# Patient Record
Sex: FEMALE | Race: WHITE | Employment: OTHER | URBAN - METROPOLITAN AREA
[De-identification: names, ages, dates, MRNs, and addresses within clinical notes are randomized per-mention and may not be internally consistent; named-entity substitution may affect disease eponyms.]

---

## 2017-01-04 ENCOUNTER — OFFICE VISIT (OUTPATIENT)
Dept: PAIN MANAGEMENT | Age: 57
End: 2017-01-04

## 2017-01-04 VITALS
HEART RATE: 116 BPM | SYSTOLIC BLOOD PRESSURE: 134 MMHG | DIASTOLIC BLOOD PRESSURE: 87 MMHG | BODY MASS INDEX: 30.42 KG/M2 | WEIGHT: 161 LBS

## 2017-01-04 DIAGNOSIS — M47.817 LUMBOSACRAL SPONDYLOSIS WITHOUT MYELOPATHY: ICD-10-CM

## 2017-01-04 DIAGNOSIS — M79.7 FIBROMYALGIA: Primary | ICD-10-CM

## 2017-01-04 DIAGNOSIS — Z79.899 ENCOUNTER FOR LONG-TERM (CURRENT) USE OF HIGH-RISK MEDICATION: ICD-10-CM

## 2017-01-04 DIAGNOSIS — G89.4 CHRONIC PAIN SYNDROME: ICD-10-CM

## 2017-01-04 DIAGNOSIS — M54.17 LUMBOSACRAL RADICULOPATHY: ICD-10-CM

## 2017-01-04 DIAGNOSIS — M25.50 PAIN IN JOINT, MULTIPLE SITES: ICD-10-CM

## 2017-01-04 DIAGNOSIS — M51.37 DEGENERATION OF LUMBAR OR LUMBOSACRAL INTERVERTEBRAL DISC: ICD-10-CM

## 2017-01-04 LAB
ALCOHOL UR POC: NORMAL
AMPHETAMINES UR POC: NEGATIVE
BARBITURATES UR POC: NEGATIVE
BENZODIAZEPINES UR POC: NORMAL
BUPRENORPHINE UR POC: NORMAL
CANNABINOIDS UR POC: NEGATIVE
CARISOPRODOL UR POC: NORMAL
COCAINE UR POC: NEGATIVE
FENTANYL UR POC: NORMAL
MDMA/ECSTASY UR POC: NEGATIVE
METHADONE UR POC: NORMAL
METHAMPHETAMINE UR POC: NEGATIVE
METHYLPHENIDATE UR POC: NEGATIVE
OPIATES UR POC: NEGATIVE
OXYCODONE UR POC: NEGATIVE
PHENCYCLIDINE UR POC: NEGATIVE
PROPOXYPHENE UR POC: NORMAL
TRAMADOL UR POC: NORMAL
TRICYCLICS UR POC: NEGATIVE

## 2017-01-04 RX ORDER — METHADONE HYDROCHLORIDE 10 MG/1
TABLET ORAL
Qty: 390 TAB | Refills: 0 | Status: SHIPPED | OUTPATIENT
Start: 2017-03-20 | End: 2017-04-04 | Stop reason: SDUPTHER

## 2017-01-04 RX ORDER — METHADONE HYDROCHLORIDE 10 MG/1
TABLET ORAL
Qty: 390 TAB | Refills: 0 | Status: SHIPPED | OUTPATIENT
Start: 2017-02-19 | End: 2017-04-04 | Stop reason: SDUPTHER

## 2017-01-04 RX ORDER — METHADONE HYDROCHLORIDE 10 MG/1
TABLET ORAL
Qty: 390 TAB | Refills: 0 | Status: SHIPPED | OUTPATIENT
Start: 2017-01-21 | End: 2017-04-04 | Stop reason: SDUPTHER

## 2017-01-04 NOTE — PROGRESS NOTES
Nursing Notes    Patient presents to the office today in follow-up. Patient rates her pain at 4/10 on the numerical pain scale. Reviewed medications with counts as follows:    Rx Date filled Qty Dispensed Pill Count Last Dose Short   Methadone 10 12/23/16 390 266 1/4/17 no         Comments:     POC UDS was performed in office today    Any new labs or imaging since last appointment? YES, blood work for c/u    Have you been to an emergency room (ER) or urgent care clinic since your last visit? NO            Have you been hospitalized since your last visit? NO     If yes, where, when, and reason for visit? Have you seen or consulted any other health care providers outside of the [address] [place] [address]  since your last visit? NO     If yes, where, when, and reason for visit? Ms. Liu Olvera has a reminder for a \"due or due soon\" health maintenance. I have asked that she contact her primary care provider for follow-up on this health maintenance.

## 2017-01-04 NOTE — MR AVS SNAPSHOT
Visit Information Date & Time Provider Department Dept. Phone Encounter #  
 1/4/2017  2:40 PM Paco Colon MD [facility] [id number] Follow-up Instructions Return in about 3 months (around 4/4/2017). Follow-up and Disposition History Your Appointments 3/21/2017  2:00 PM  
Office Visit with MD [address] [facility]) Appt Note: Return in about 3 months (around 3/15/2017) for HTN, DM, chronic pain, Med refills [address] [id number]  
[address] [facility] [address] [facility]  
  
    
 4/4/2017  2:40 PM  
Follow Up with Paco Colon MD  
[facility]) Appt Note: return in 2 months [address] [address]  
[phone] [facility] [id number] Upcoming Health Maintenance Date Due Pneumococcal 19-64 Medium Risk (1 of 1 - PPSV23) 3/18/1979 EYE EXAM RETINAL OR DILATED Q1 6/19/2016 BREAST CANCER SCRN MAMMOGRAM 6/30/2016 FOOT EXAM Q1 9/30/2016 COLONOSCOPY 1/20/2017 DTaP/Tdap/Td series (1 - Tdap) 8/23/2017* MICROALBUMIN Q1 4/18/2017 HEMOGLOBIN A1C Q6M 6/15/2017 PAP AKA CERVICAL CYTOLOGY 6/30/2017 LIPID PANEL Q1 12/15/2017 *Topic was postponed. The date shown is not the original due date. Allergies as of 1/4/2017  Review Complete On: 1/4/2017 By: Paco Colon MD  
  
 Severity Noted Reaction Type Reactions Zanaflex [Tizanidine]  04/18/2016   Side Effect Other (comments) Nausea and worsening headache Current Immunizations  Reviewed on 8/23/2016 Name Date Influenza Vaccine 9/30/2015  2:50 PM  
 Influenza Vaccine (Quad) PF 12/15/2016 Pneumococcal Conjugate (PCV-13) 12/15/2016 Not reviewed this visit You Were Diagnosed With   
  
 Codes Comments Fibromyalgia    -  Primary ICD-10-CM: M79.7 ICD-9-CM: 729.1 Encounter for long-term (current) use of high-risk medication     ICD-10-CM: Z79.899 ICD-9-CM: V58.69 Pain in joint, multiple sites     ICD-10-CM: M25.50 ICD-9-CM: 719.49 Lumbosacral radiculopathy     ICD-10-CM: M54.17 ICD-9-CM: 724.4 Degeneration of lumbar or lumbosacral intervertebral disc     ICD-10-CM: M51.37 
ICD-9-CM: 722.52 Lumbosacral spondylosis without myelopathy     ICD-10-CM: D30.086 ICD-9-CM: 060. 3 Chronic pain syndrome     ICD-10-CM: G89.4 ICD-9-CM: 338. 4 Vitals BP Pulse Weight(growth percentile) BMI OB Status Smoking Status 134/87 (!) 116 161 lb (73 kg) 30.42 kg/m2 Hysterectomy Never Smoker BMI and BSA Data Body Mass Index Body Surface Area  
 30.42 kg/m 2 1.77 m 2 Preferred Pharmacy Pharmacy Name Phone [facility], [address] [place] [address] [phone] Your Updated Medication List  
  
   
This list is accurate as of: 1/4/17  3:58 PM.  Always use your most recent med list.  
  
  
  
  
 atenolol 25 mg tablet Commonly known as:  TENORMIN Take 1 Tab by mouth daily. cyclobenzaprine 10 mg tablet Commonly known as:  FLEXERIL  
TAKE 1 TABLET BY MOUTH TWICE DAILY LORazepam 1 mg tablet Commonly known as:  ATIVAN  
TAKE 1 TABLET BY MOUTH TWICE DAILY AS NEEDED FOR ANXIETY  Indications: ANXIETY  
  
 metFORMIN  mg tablet Commonly known as:  GLUCOPHAGE XR Take 1 Tab by mouth daily. * methadone 10 mg tablet Commonly known as:  DOLOPHINE Take 5 tablets in the morning, 4 tablets at noon, and 4 tablets at bedtime for chronic, severe pain. Indications: CHRONIC PAIN, SEVERE PAIN  
  
 * methadone 10 mg tablet Commonly known as:  DOLOPHINE Take 5 tablets in the morning, 4 tablets at noon, and 4 tablets at bedtime for chronic, severe pain. Indications: CHRONIC PAIN, SEVERE PAIN Start taking on:  1/21/2017 * methadone 10 mg tablet Commonly known as:  DOLOPHINE Take 5 tablets in the morning, 4 tablets at noon, and 4 tablets at bedtime for chronic, severe pain. Indications: CHRONIC PAIN, SEVERE PAIN Start taking on:  2/19/2017 * methadone 10 mg tablet Commonly known as:  DOLOPHINE Take 5 tablets in the morning, 4 tablets at noon, and 4 tablets at bedtime for chronic, severe pain. Indications: CHRONIC PAIN, SEVERE PAIN Start taking on:  3/20/2017  
  
 methylphenidate 10 mg tablet Commonly known as:  RITALIN Take 1 Tab by mouth three (3) times daily. Max Daily Amount: 30 mg. NexIUM 40 mg capsule Generic drug:  esomeprazole TK 1 C PO QD AT 3PM  
  
 OTHER  
DDS Lumbar Traction Belt Use as directed  
  
 promethazine 25 mg tablet Commonly known as:  PHENERGAN Take 1 Tab by mouth every six (6) hours as needed for Nausea. simvastatin 40 mg tablet Commonly known as:  ZOCOR  
TAKE 1 TABLET BY MOUTH EVERY NIGHT AT BEDTIME  
  
 topiramate 50 mg tablet Commonly known as:  TOPAMAX TAKE 3 TABLETS BY MOUTH TWICE DAILY * Notice: This list has 4 medication(s) that are the same as other medications prescribed for you. Read the directions carefully, and ask your doctor or other care provider to review them with you. Prescriptions Printed Refills  
 methadone (DOLOPHINE) 10 mg tablet 0 Starting on: 3/20/2017 Sig: Take 5 tablets in the morning, 4 tablets at noon, and 4 tablets at bedtime for chronic, severe pain. Indications: CHRONIC PAIN, SEVERE PAIN Class: Print  
 methadone (DOLOPHINE) 10 mg tablet 0 Starting on: 2/19/2017 Sig: Take 5 tablets in the morning, 4 tablets at noon, and 4 tablets at bedtime for chronic, severe pain. Indications: CHRONIC PAIN, SEVERE PAIN  Class: Print  
 methadone (DOLOPHINE) 10 mg tablet 0  
 Starting on: 1/21/2017 Sig: Take 5 tablets in the morning, 4 tablets at noon, and 4 tablets at bedtime for chronic, severe pain. Indications: CHRONIC PAIN, SEVERE PAIN Class: Print Follow-up Instructions Return in about 3 months (around 4/4/2017). To-Do List   
 01/11/2017  2:30 PM  
  Appointment with [address] at [address] ([phone]) EXAM INSTRUCTIONS -Remove deodorant, powder and/or perfume prior to exam. -If you are currently nursing, breast should be emptied prior to exam.  GENERAL INSTRUCTIONS -If you have a hand-written prescription for this exam, you must bring it with you to your appointment. -You may be responsible for any co-payments and deductibles as indicated by your insurance carrier. -Only patients will be allowed in the exam room, including children. -Children under the age of 15 may not be left unattended. -Bring all relevant prior images from facilities outside of [facility]. Failure to provide images may delay reading by Radiologist. -[address] [place] [address] patients must have an armband and be accompanied by a caregiver or family member. APPOINTMENT CANCELLATION To reschedule or cancel an appointment, please contact the Scheduling Department at [phone]. Patient Instructions Current health maintenance issues were reviewed and the patient was advised to followup with his/her PCP for completion of these items. Introducing [facility]! [facility] introduces Mogo[employer]ix patient portal. Now you can access parts of your medical record, email your doctor's office, and request medication refills online. 1. In your internet browser, go to [email or web address] 2. Click on the First Time User? Click Here link in the Sign In box. You will see the New Member Sign Up page. 3. Enter your MogoTix Access Code exactly as it appears below.  You will not need to use this code after youve completed the sign-up process. If you do not sign up before the expiration date, you must request a new code. · Pressable Access Code: [email or web address] Expires: 3/15/2017  2:35 PM 
 
4. Enter the last four digits of your Social Security Number (xxxx) and Date of Birth (mm/dd/yyyy) as indicated and click Submit. You will be taken to the next sign-up page. 5. Create a Pressable ID. This will be your Pressable login ID and cannot be changed, so think of one that is secure and easy to remember. 6. Create a Pressable password. You can change your password at any time. 7. Enter your Password Reset Question and Answer. This can be used at a later time if you forget your password. 8. Enter your e-mail address. You will receive e-mail notification when new information is available in [address] 9. Click Sign Up. You can now view and download portions of your medical record. 10. Click the Download Summary menu link to download a portable copy of your medical information. If you have questions, please visit the Frequently Asked Questions section of the Pressable website. Remember, Pressable is NOT to be used for urgent needs. For medical emergencies, dial 911. Now available from your iPhone and Android! Please provide this summary of care documentation to your next provider. Your primary care clinician is listed as [facility]. If you have any questions after today's visit, please call [phone].

## 2017-01-04 NOTE — PROGRESS NOTES
HISTORY OF PRESENT ILLNESS  Trista Larios is a 60 y.o. female. HPI returns for follow-up of chronic, severe pain which is widespread and related to fibromyalgia. Pain continues to be under excellent control, averaging 4-10. She has noted that the changing weather has adversely affected her overall pain level. Pain level today 4 out of 10, outcome 9/28,(The lower the upper number, the better the outcome)    Physical activity and mobility as well as sleep are fair, mood is good. No reported side effects. A review of the [place] prescription monitoring program does not identify any inconsistency. UDS obtained and reviewed; formal confirmation from laboratory is pending. Recent laboratory testing was reviewed with abnormalities as follows:  Glucose 122 with hemoglobin A1c 7.6  GFR 53  We will follow up with her PCP. The significance of the findings was discussed with the patient  Review of Systems   Constitutional: Positive for malaise/fatigue and weight loss. Weight gain   HENT: Positive for tinnitus. Vertigo   Eyes: Positive for blurred vision. Glasses   Cardiovascular:        Cold hands and feet   Gastrointestinal: Positive for heartburn and nausea. Musculoskeletal: Positive for joint pain and myalgias. Stiffness   Neurological: Positive for seizures and headaches. Difficulty walking  Difficulty writing   All other systems reviewed and are negative. Physical Exam   Constitutional: She is oriented to person, place, and time. She appears well-developed and well-nourished. She is cooperative. No distress. HENT:   Head: Normocephalic and atraumatic. Neck: No thyromegaly present. Pulmonary/Chest: Effort normal. No respiratory distress. Neurological: She is alert and oriented to person, place, and time. Gait (antalgic, no imbalance) abnormal.   Skin: Skin is warm and dry. She is not diaphoretic. Psychiatric: She has a normal mood and affect.  Her speech is normal. Cognition and memory are normal.   Nursing note and vitals reviewed. ASSESSMENT and PLAN  Encounter Diagnoses   Name Primary?  Fibromyalgia Yes    Encounter for long-term (current) use of high-risk medication     Pain in joint, multiple sites     Lumbosacral radiculopathy     Degeneration of lumbar or lumbosacral intervertebral disc     Lumbosacral spondylosis without myelopathy     Chronic pain syndrome      An EKG will be obtained today to monitor the QTc interval she is on methadone and her last EKG was performed in October 2015. She will continue on her current analgesic regimen as this is providing excellent pain control with improve functionality and minimal side effects. 3 month reassess her    No concerns are raised for misuse, abuse, or diversion. 1. Pain medications are prescribed with the objective of pain relief and improved physical and psychosocial function in this patient. 2. Counseled patient on proper use of prescribed medications and reviewed opioid contract. 3. Counseled patient about chronic medical conditions and their relationship to anxiety and depression and recommended mental health support as needed. 4. Reviewed with patient self-help tools, home exercise, and lifestyle changes to assist the patient in self-management of symptoms. 5. Advised patient to have a primary care provider to continue care for health maintenance and general medical conditions and support for referral to specialty care as needed. 6. Reviewed with patient the treatment plan, goals of treatment plan, and limitations of treatment plan, to include the potential for side effects from medications and procedures. If side effects occur, it is the responsibility of the patient to inform the clinic so that a change in the treatment plan can be made in a safe manner.  The patient is advised that stopping prescribed medication may cause an increase in symptoms and possible medication withdrawal symptoms. The patient is informed an emergency room evaluation may be necessary if this occurs. DISPOSITION: The patients condition and plan were discussed at length and all questions were answered. The patient agrees with the plan.     Counseling occupied > 50% of visit:  Total time: 40 minutes

## 2017-01-18 RX ORDER — LORAZEPAM 1 MG/1
TABLET ORAL
Qty: 60 TAB | Refills: 0 | Status: SHIPPED | OUTPATIENT
Start: 2017-01-18 | End: 2017-04-06 | Stop reason: SDUPTHER

## 2017-04-04 ENCOUNTER — OFFICE VISIT (OUTPATIENT)
Dept: PAIN MANAGEMENT | Age: 57
End: 2017-04-04

## 2017-04-04 VITALS
DIASTOLIC BLOOD PRESSURE: 90 MMHG | BODY MASS INDEX: 30.42 KG/M2 | SYSTOLIC BLOOD PRESSURE: 139 MMHG | WEIGHT: 161 LBS | HEART RATE: 99 BPM

## 2017-04-04 DIAGNOSIS — M79.7 FIBROMYALGIA: ICD-10-CM

## 2017-04-04 DIAGNOSIS — Z79.899 ENCOUNTER FOR LONG-TERM (CURRENT) USE OF HIGH-RISK MEDICATION: ICD-10-CM

## 2017-04-04 DIAGNOSIS — G89.4 CHRONIC PAIN SYNDROME: ICD-10-CM

## 2017-04-04 DIAGNOSIS — K11.23 CHRONIC SIALOADENITIS: ICD-10-CM

## 2017-04-04 DIAGNOSIS — M25.50 PAIN IN JOINT, MULTIPLE SITES: ICD-10-CM

## 2017-04-04 DIAGNOSIS — M15.9 GENERALIZED OA: ICD-10-CM

## 2017-04-04 DIAGNOSIS — M51.37 DEGENERATION OF LUMBAR OR LUMBOSACRAL INTERVERTEBRAL DISC: ICD-10-CM

## 2017-04-04 DIAGNOSIS — M54.17 LUMBOSACRAL RADICULOPATHY: Primary | ICD-10-CM

## 2017-04-04 DIAGNOSIS — M47.817 LUMBOSACRAL SPONDYLOSIS WITHOUT MYELOPATHY: ICD-10-CM

## 2017-04-04 RX ORDER — NALOXONE HYDROCHLORIDE 4 MG/.1ML
4 SPRAY NASAL AS NEEDED
Qty: 1 BOX | Refills: 1 | Status: SHIPPED | OUTPATIENT
Start: 2017-04-04 | End: 2018-05-31 | Stop reason: CLARIF

## 2017-04-04 RX ORDER — METHADONE HYDROCHLORIDE 10 MG/1
TABLET ORAL
Qty: 390 TAB | Refills: 0 | Status: SHIPPED | OUTPATIENT
Start: 2017-05-18 | End: 2018-05-04

## 2017-04-04 RX ORDER — METHADONE HYDROCHLORIDE 10 MG/1
TABLET ORAL
Qty: 390 TAB | Refills: 0 | Status: SHIPPED | OUTPATIENT
Start: 2017-06-16 | End: 2018-05-04

## 2017-04-04 NOTE — PROGRESS NOTES
HISTORY OF PRESENT ILLNESS  Cornel Berger is a 59 y.o. female. HPI returns for follow-up of chronic, severe pain which is widespread and related to fibromyalgia  Since last seen she has experienced a strain of the right paracervical musculature without clear precipitating cause. She has been trying heat and has found that it is improving steadily    Pain level today 4 out of 10, outcome 10/28,(The lower the upper number, the better the outcome)  Physical activity mobility, mood, and sleep are all fair. No reported side effects. A current review of the  does not identify any inconsistency. Review of Systems   Constitutional: Positive for malaise/fatigue and weight loss. Weight gain   HENT: Positive for tinnitus. Vertigo   Eyes: Positive for blurred vision. Glasses   Cardiovascular:        Cold hands and feet   Gastrointestinal: Positive for heartburn and nausea. Musculoskeletal: Positive for joint pain and myalgias. Stiffness   Neurological: Positive for seizures and headaches. Difficulty walking  Difficulty writing   All other systems reviewed and are negative. Physical Exam   Constitutional: She is oriented to person, place, and time. She appears well-developed and well-nourished. She is cooperative. No distress. HENT:   Head: Normocephalic and atraumatic. Neck: No thyromegaly present. Pulmonary/Chest: Effort normal. No respiratory distress. Neurological: She is alert and oriented to person, place, and time. Gait (antalgic, no imbalance) abnormal.   Skin: Skin is warm and dry. She is not diaphoretic. Psychiatric: She has a normal mood and affect. Her speech is normal. Cognition and memory are normal.   Nursing note and vitals reviewed. ASSESSMENT and PLAN  Encounter Diagnoses   Name Primary?     Lumbosacral radiculopathy Yes    Encounter for long-term (current) use of high-risk medication     Pain in joint, multiple sites     Degeneration of lumbar or lumbosacral intervertebral disc     Lumbosacral spondylosis without myelopathy     Chronic pain syndrome     Fibromyalgia     Chronic sialoadenitis     Generalized OA      She will continue on her current analgesic regimen as this is providing excellent pain control with improve functionality and minimal side effects. Because the patient's current regimen places him/her at increased risk for possible overdose, a prescription for naloxone nasal spray is being provided. The patient understands that this medication is only to be used in the setting of a possible overdose and that inadvertent use of this medication could precipitate overt withdrawal.    3 month reassess her    No concerns are raised for misuse, abuse, or diversion. 1. Pain medications are prescribed with the objective of pain relief and improved physical and psychosocial function in this patient. 2. Counseled patient on proper use of prescribed medications and reviewed opioid contract. 3. Counseled patient about chronic medical conditions and their relationship to anxiety and depression and recommended mental health support as needed. 4. Reviewed with patient self-help tools, home exercise, and lifestyle changes to assist the patient in self-management of symptoms. 5. Advised patient to have a primary care provider to continue care for health maintenance and general medical conditions and support for referral to specialty care as needed. 6. Reviewed with patient the treatment plan, goals of treatment plan, and limitations of treatment plan, to include the potential for side effects from medications and procedures. If side effects occur, it is the responsibility of the patient to inform the clinic so that a change in the treatment plan can be made in a safe manner. The patient is advised that stopping prescribed medication may cause an increase in symptoms and possible medication withdrawal symptoms.  The patient is informed an emergency room evaluation may be necessary if this occurs. DISPOSITION: The patients condition and plan were discussed at length and all questions were answered. The patient agrees with the plan.     Counseling occupied > 50% of visit:  Total time: 30 minutes

## 2017-04-04 NOTE — PROGRESS NOTES
Nursing Notes    Patient presents to the office today in follow-up. Patient rates her pain at 4/10 on the numerical pain scale. Reviewed medications with counts as follows:    Rx Date filled Qty Dispensed Pill Count Last Dose Short   Methadone 10 3/22/17 390 246 4/4/17 no       Comments:     POC UDS was not performed in office today    Any new labs or imaging since last appointment? NO    Have you been to an emergency room (ER) or urgent care clinic since your last visit? NO            Have you been hospitalized since your last visit? NO     If yes, where, when, and reason for visit? Have you seen or consulted any other health care providers outside of the [address] [place] [address]  since your last visit? YES     If yes, where, when, and reason for visit? Eye dr for chronic dry eye    Ms. Shahana Hawley has a reminder for a \"due or due soon\" health maintenance. I have asked that she contact her primary care provider for follow-up on this health maintenance.

## 2017-04-06 ENCOUNTER — OFFICE VISIT (OUTPATIENT)
Dept: INTERNAL MEDICINE CLINIC | Age: 57
End: 2017-04-06

## 2017-04-06 ENCOUNTER — HOSPITAL ENCOUNTER (OUTPATIENT)
Dept: LAB | Age: 57
Discharge: HOME | End: 2017-04-06
Payer: MEDICARE

## 2017-04-06 VITALS
SYSTOLIC BLOOD PRESSURE: 124 MMHG | WEIGHT: 166 LBS | TEMPERATURE: 98.3 F | BODY MASS INDEX: 31.34 KG/M2 | HEART RATE: 96 BPM | HEIGHT: 61 IN | DIASTOLIC BLOOD PRESSURE: 75 MMHG | RESPIRATION RATE: 16 BRPM | OXYGEN SATURATION: 99 %

## 2017-04-06 DIAGNOSIS — I10 ESSENTIAL HYPERTENSION: Chronic | ICD-10-CM

## 2017-04-06 DIAGNOSIS — Z76.0 MEDICATION REFILL: ICD-10-CM

## 2017-04-06 DIAGNOSIS — E11.9 TYPE 2 DIABETES MELLITUS WITHOUT COMPLICATION, WITHOUT LONG-TERM CURRENT USE OF INSULIN (HCC): Primary | Chronic | ICD-10-CM

## 2017-04-06 DIAGNOSIS — E78.5 DYSLIPIDEMIA: Chronic | ICD-10-CM

## 2017-04-06 LAB — GLUCOSE POC: 173 MG/DL

## 2017-04-06 PROCEDURE — 80048 BASIC METABOLIC PNL TOTAL CA: CPT | Performed by: INTERNAL MEDICINE

## 2017-04-06 PROCEDURE — 36415 COLL VENOUS BLD VENIPUNCTURE: CPT | Performed by: INTERNAL MEDICINE

## 2017-04-06 PROCEDURE — 83036 HEMOGLOBIN GLYCOSYLATED A1C: CPT | Performed by: INTERNAL MEDICINE

## 2017-04-06 RX ORDER — METHYLPHENIDATE HYDROCHLORIDE 10 MG/1
10 TABLET ORAL 3 TIMES DAILY
Qty: 90 TAB | Refills: 0 | Status: SHIPPED | OUTPATIENT
Start: 2017-04-06 | End: 2017-07-06 | Stop reason: SDUPTHER

## 2017-04-06 RX ORDER — SIMVASTATIN 40 MG/1
TABLET, FILM COATED ORAL
Qty: 90 TAB | Refills: 5 | Status: SHIPPED | OUTPATIENT
Start: 2017-04-06 | End: 2018-04-04 | Stop reason: SDUPTHER

## 2017-04-06 RX ORDER — METFORMIN HYDROCHLORIDE 750 MG/1
750 TABLET, EXTENDED RELEASE ORAL DAILY
Qty: 90 TAB | Refills: 5 | Status: SHIPPED | OUTPATIENT
Start: 2017-04-06 | End: 2017-11-14 | Stop reason: SDUPTHER

## 2017-04-06 RX ORDER — LIFITEGRAST 50 MG/ML
SOLUTION/ DROPS OPHTHALMIC
COMMUNITY
Start: 2017-03-28 | End: 2018-08-22

## 2017-04-06 RX ORDER — CYCLOBENZAPRINE HCL 10 MG
TABLET ORAL
Qty: 180 TAB | Refills: 5 | Status: SHIPPED | OUTPATIENT
Start: 2017-04-06 | End: 2018-04-04 | Stop reason: SDUPTHER

## 2017-04-06 RX ORDER — TOPIRAMATE 50 MG/1
TABLET, FILM COATED ORAL
Qty: 540 TAB | Refills: 5 | Status: SHIPPED | OUTPATIENT
Start: 2017-04-06 | End: 2018-03-16 | Stop reason: SDUPTHER

## 2017-04-06 RX ORDER — LORAZEPAM 1 MG/1
TABLET ORAL
Qty: 60 TAB | Refills: 5 | Status: SHIPPED | OUTPATIENT
Start: 2017-04-06 | End: 2017-07-06 | Stop reason: SDUPTHER

## 2017-04-06 RX ORDER — ATENOLOL 25 MG/1
25 TABLET ORAL DAILY
Qty: 90 TAB | Refills: 5 | Status: SHIPPED | OUTPATIENT
Start: 2017-04-06 | End: 2017-12-26 | Stop reason: SDUPTHER

## 2017-04-06 NOTE — PROGRESS NOTES
Chief Complaint   Patient presents with    Hypertension    Diabetes    Cholesterol Problem    Medication Refill       Pt preferred language for health care discussion is english. Is someone accompanying this pt? Room mate    Is the patient using any DME equipment during OV? no    Depression Screening completed. Yes    Learning Assessment completed. Yes    Abuse Screening completed. Yes    Health Maintenance reviewed and discussed per provider. Yes    Pt is due for FIT test, Colonoscopy, Mammogram, Bone Density, Pap, Hep C screen, Microalbumin, A1C, Lipid, Foot exam, Diabetic eye exam, Zostavax, Pneumo-13 or Peumo-23, Flu, Tdap. Please order/place referral if appropriate. Advance Directive:  1. Do you have an advance directive in place? Patient Reply:no    2. If not, would you like material regarding how to put one in place? Patient Reply: No    Coordination of Care:  1. Have you been to the ER, urgent care clinic since your last visit? Hospitalized since your last visit? no    2. Have you seen or consulted any other health care providers outside of the [address] [place] [address] since your last visit? Include any pap smears or colon screening.  no

## 2017-04-06 NOTE — MR AVS SNAPSHOT
Visit Information Date & Time Provider Department Dept. Phone Encounter #  
 4/6/2017  5:45 PM [address] [phone] [id number] Follow-up Instructions Return in about 3 months (around 7/6/2017) for HTN, DM, Med refills. Your Appointments 6/30/2017  2:40 PM  
Follow Up with Martha Carreno MD  
[facility] Appt Note: return in 3 months 30 [facility] [address] [phone]  Raj[address] [phone] Upcoming Health Maintenance Date Due Pneumococcal 19-64 Medium Risk (1 of 1 - PPSV23) 3/18/1979 EYE EXAM RETINAL OR DILATED Q1 6/19/2016 FOOT EXAM Q1 9/30/2016 COLONOSCOPY 1/20/2017 MICROALBUMIN Q1 4/18/2017 PAP AKA CERVICAL CYTOLOGY 6/30/2017 DTaP/Tdap/Td series (1 - Tdap) 8/23/2017* HEMOGLOBIN A1C Q6M 6/15/2017 LIPID PANEL Q1 12/15/2017 BREAST CANCER SCRN MAMMOGRAM 1/17/2019 *Topic was postponed. The date shown is not the original due date. Allergies as of 4/6/2017  Review Complete On: 4/6/2017 By: Adonis Figueroa MD  
  
 Severity Noted Reaction Type Reactions Zanaflex [Tizanidine]  04/18/2016   Side Effect Other (comments) Nausea and worsening headache Current Immunizations  Reviewed on 8/23/2016 Name Date Influenza Vaccine 9/30/2015  2:50 PM  
 Influenza Vaccine (Quad) PF 12/15/2016 Pneumococcal Conjugate (PCV-13) 12/15/2016 Not reviewed this visit You Were Diagnosed With   
  
 Codes Comments Type 2 diabetes mellitus without complication, without long-term current use of insulin (HCC)    -  Primary ICD-10-CM: E11.9 ICD-9-CM: 250.00 Essential hypertension     ICD-10-CM: I10 
ICD-9-CM: 401.9 Dyslipidemia     ICD-10-CM: E78.5 ICD-9-CM: 272.4 Medication refill     ICD-10-CM: Z76.0 ICD-9-CM: V68.1 Vitals BP Pulse Temp Resp Height(growth percentile) Weight(growth percentile) 124/75 96 98.3 °F (36.8 °C) (Oral) 16 5' 1\" (1.549 m) 166 lb (75.3 kg) SpO2 BMI OB Status Smoking Status 99% 31.37 kg/m2 Hysterectomy Never Smoker BMI and BSA Data Body Mass Index Body Surface Area  
 31.37 kg/m 2 1.8 m 2 Preferred Pharmacy Pharmacy Name Phone 4256 [facility], [address] [place] [address] [phone] Your Updated Medication List  
  
   
This list is accurate as of: 4/6/17  6:29 PM.  Always use your most recent med list.  
  
  
  
  
 atenolol 25 mg tablet Commonly known as:  TENORMIN Take 1 Tab by mouth daily. cyclobenzaprine 10 mg tablet Commonly known as:  FLEXERIL  
TAKE 1 TABLET BY MOUTH TWICE DAILY LORazepam 1 mg tablet Commonly known as:  ATIVAN  
TAKE 1 TABLET BY MOUTH TWICE DAILY AS NEEDED FOR ANXIETY  
  
 metFORMIN  mg tablet Commonly known as:  GLUCOPHAGE XR Take 1 Tab by mouth daily. * methadone 10 mg tablet Commonly known as:  DOLOPHINE Take 5 tablets in the morning, 4 tablets at noon, and 4 tablets at bedtime for chronic, severe pain. Indications: CHRONIC PAIN, SEVERE PAIN  
  
 * methadone 10 mg tablet Commonly known as:  DOLOPHINE Take 5 tablets in the morning, 4 tablets at noon, and 4 tablets at bedtime for chronic, severe pain. Indications: Chronic Pain, Severe Pain Start taking on:  5/18/2017 * methadone 10 mg tablet Commonly known as:  DOLOPHINE Take 5 tablets in the morning, 4 tablets at noon, and 4 tablets at bedtime for chronic, severe pain. Indications: Chronic Pain, Severe Pain Start taking on:  6/16/2017 * methylphenidate 10 mg tablet Commonly known as:  RITALIN Take 1 Tab by mouth three (3) times daily. Max Daily Amount: 30 mg.  
  
 * methylphenidate 10 mg tablet Commonly known as:  RITALIN  
 Take 1 Tab by mouth three (3) times daily. * methylphenidate 10 mg tablet Commonly known as:  RITALIN Take 1 Tab by mouth three (3) times daily. Max Daily Amount: 30 mg.  
  
 naloxone 4 mg/actuation Spry 4 mg by Nasal route as needed for up to 2 doses. Indications: OPIOID TOXICITY NexIUM 40 mg capsule Generic drug:  esomeprazole TK 1 C PO QD AT 3PM  
  
 OTHER  
DDS Lumbar Traction Belt Use as directed  
  
 promethazine 25 mg tablet Commonly known as:  PHENERGAN Take 1 Tab by mouth every six (6) hours as needed for Nausea. simvastatin 40 mg tablet Commonly known as:  ZOCOR  
TAKE 1 TABLET BY MOUTH EVERY NIGHT AT BEDTIME  
  
 topiramate 50 mg tablet Commonly known as:  TOPAMAX TAKE 3 TABLETS BY MOUTH TWICE DAILY XIIDRA 5 % Dpet Generic drug:  lifitegrast  
  
 * Notice: This list has 6 medication(s) that are the same as other medications prescribed for you. Read the directions carefully, and ask your doctor or other care provider to review them with you. Prescriptions Printed Refills LORazepam (ATIVAN) 1 mg tablet 5 Sig: TAKE 1 TABLET BY MOUTH TWICE DAILY AS NEEDED FOR ANXIETY Class: Print  
 methylphenidate (RITALIN) 10 mg tablet 0 Sig: Take 1 Tab by mouth three (3) times daily. Max Daily Amount: 30 mg.  
 Class: Print Route: Oral  
 methylphenidate (RITALIN) 10 mg tablet 0 Sig: Take 1 Tab by mouth three (3) times daily. Class: Print Route: Oral  
 methylphenidate (RITALIN) 10 mg tablet 0 Sig: Take 1 Tab by mouth three (3) times daily. Max Daily Amount: 30 mg.  
 Class: Print Route: Oral  
  
Prescriptions Sent to Pharmacy Refills  
 topiramate (TOPAMAX) 50 mg tablet 5 Sig: TAKE 3 TABLETS BY MOUTH TWICE DAILY  Class: Normal  
 Pharmacy: [facility] [address], [address] [place] [address] Ph #: [phone]  
 simvastatin (ZOCOR) 40 mg tablet 5  
 Sig: TAKE 1 TABLET BY MOUTH EVERY NIGHT AT BEDTIME Class: Normal  
 Pharmacy: [facility] [address], [address] [place] [address] Dr Ph #: [phone]  
 metFORMIN ER (GLUCOPHAGE XR) 750 mg tablet 5 Sig: Take 1 Tab by mouth daily. Class: Normal  
 Pharmacy: [facility] [address] Ph #: [phone] Route: Oral  
 atenolol (TENORMIN) 25 mg tablet 5 Sig: Take 1 Tab by mouth daily. Class: Normal  
 Pharmacy: [facility] [address], [address] [place] [address] Dr Ph #: [phone] Route: Oral  
 cyclobenzaprine (FLEXERIL) 10 mg tablet 5 Sig: TAKE 1 TABLET BY MOUTH TWICE DAILY Class: Normal  
 Pharmacy: [facility] [address], [address] [place] [address] Dr Ph #: [phone] We Performed the Following AMB POC GLUCOSE BLOOD, BY GLUCOSE MONITORING DEVICE [47813 CPT(R)] AMB POC HEMOGLOBIN A1C [55576 CPT(R)] Follow-up Instructions Return in about 3 months (around 7/6/2017) for HTN, DM, Med refills. Introducing [facility]! [facility] introduces [employer] patient portal. Now you can access parts of your medical record, email your doctor's office, and request medication refills online. 1. In your internet browser, go to [email or web address] 2. Click on the First Time User? Click Here link in the Sign In box. You will see the New Member Sign Up page. 3. Enter your [employer] Access Code exactly as it appears below. You will not need to use this code after youve completed the sign-up process. If you do not sign up before the expiration date, you must request a new code. · [employer] Access Code: [email or web address] Expires: 7/3/2017  3:38 PM 
 
 4. Enter the last four digits of your Social Security Number (xxxx) and Date of Birth (mm/dd/yyyy) as indicated and click Submit. You will be taken to the next sign-up page. 5. Create a [employer] ID. This will be your [employer] login ID and cannot be changed, so think of one that is secure and easy to remember. 6. Create a [employer] password. You can change your password at any time. 7. Enter your Password Reset Question and Answer. This can be used at a later time if you forget your password. 8. Enter your e-mail address. You will receive e-mail notification when new information is available in [address] 9. Click Sign Up. You can now view and download portions of your medical record. 10. Click the Download Summary menu link to download a portable copy of your medical information. If you have questions, please visit the Frequently Asked Questions section of the [employer] website. Remember, [employer] is NOT to be used for urgent needs. For medical emergencies, dial 911. Now available from your iPhone and Android! Please provide this summary of care documentation to your next provider. Your primary care clinician is listed as [facility]. If you have any questions after today's visit, please call [phone].

## 2017-04-06 NOTE — PROGRESS NOTES
HISTORY OF PRESENT ILLNESS  Rosemarie Benavides is a 59 y.o. female. Visit Vitals    /75    Pulse 96    Temp 98.3 °F (36.8 °C) (Oral)    Resp 16    Ht 5' 1\" (1.549 m)    Wt 166 lb (75.3 kg)    SpO2 99%    BMI 31.37 kg/m2       Hypertension    The history is provided by the patient. This is a chronic problem. The current episode started more than 1 week ago. The problem has not changed since onset. There are no associated agents to hypertension. Risk factors include obesity, a sedentary lifestyle, diabetes mellitus and stress. Diabetes   The history is provided by the patient. This is a chronic problem. The current episode started more than 1 week ago. The problem occurs daily. The problem has not changed since onset. Exacerbated by: diet. The symptoms are relieved by medications (diet). Treatments tried: see med. The treatment provided moderate relief. Cholesterol Problem   The history is provided by the patient. This is a chronic problem. The current episode started more than 1 week ago. The problem occurs daily. Exacerbated by: diet. The symptoms are relieved by medications (diet). Treatments tried: see med list. The treatment provided moderate relief. Medication Refill         Review of Systems   Constitutional: Negative. Respiratory: Negative. Cardiovascular: Negative. Musculoskeletal: Positive for joint pain and myalgias. Neurological: Negative. Physical Exam   Constitutional: She is oriented to person, place, and time. She appears well-developed and well-nourished. No distress. HENT:   Right jaw pain and movement. Cardiovascular: Normal rate and regular rhythm. Pulmonary/Chest: Effort normal and breath sounds normal.   Neurological: She is alert and oriented to person, place, and time. Skin: Skin is warm and dry. She is not diaphoretic. Psychiatric: She has a normal mood and affect. Nursing note and vitals reviewed.       ASSESSMENT and PLAN    ICD-10-CM ICD-9-CM    1. Type 2 diabetes mellitus without complication, without long-term current use of insulin (HCC) E11.9 250.00 AMB POC GLUCOSE BLOOD, BY GLUCOSE MONITORING DEVICE      AMB POC HEMOGLOBIN A1C   2. Essential hypertension I10 401.9    3. Dyslipidemia E78.5 272.4    4.  Medication refill Z76.0 V68.1 LORazepam (ATIVAN) 1 mg tablet      topiramate (TOPAMAX) 50 mg tablet      simvastatin (ZOCOR) 40 mg tablet      metFORMIN ER (GLUCOPHAGE XR) 750 mg tablet      atenolol (TENORMIN) 25 mg tablet      cyclobenzaprine (FLEXERIL) 10 mg tablet      methylphenidate (RITALIN) 10 mg tablet      methylphenidate (RITALIN) 10 mg tablet      methylphenidate (RITALIN) 10 mg tablet       Doing OK    BP controlled    Check glu today    F/u 3 months for med refills

## 2017-04-07 ENCOUNTER — HOSPITAL ENCOUNTER (OUTPATIENT)
Dept: LAB | Age: 57
Discharge: HOME | End: 2017-04-07

## 2017-04-07 DIAGNOSIS — E11.9 TYPE 2 DIABETES MELLITUS WITHOUT COMPLICATION, WITHOUT LONG-TERM CURRENT USE OF INSULIN (HCC): Chronic | ICD-10-CM

## 2017-04-07 DIAGNOSIS — I10 ESSENTIAL HYPERTENSION: Chronic | ICD-10-CM

## 2017-04-07 LAB
ANION GAP BLD CALC-SCNC: 8 MMOL/L (ref 3–18)
BUN SERPL-MCNC: 14 MG/DL (ref 7–18)
BUN/CREAT SERPL: 12 (ref 12–20)
CALCIUM SERPL-MCNC: 8.7 MG/DL (ref 8.5–10.1)
CHLORIDE SERPL-SCNC: 108 MMOL/L (ref 100–108)
CO2 SERPL-SCNC: 26 MMOL/L (ref 21–32)
CREAT SERPL-MCNC: 1.2 MG/DL (ref 0.6–1.3)
GLUCOSE SERPL-MCNC: 131 MG/DL (ref 74–99)
HBA1C MFR BLD: 8 % (ref 4.2–5.6)
POTASSIUM SERPL-SCNC: 4 MMOL/L (ref 3.5–5.5)
SODIUM SERPL-SCNC: 142 MMOL/L (ref 136–145)

## 2017-04-18 ENCOUNTER — TELEPHONE (OUTPATIENT)
Dept: PAIN MANAGEMENT | Age: 57
End: 2017-04-18

## 2017-04-18 NOTE — TELEPHONE ENCOUNTER
Narcan spray was denied by [facility] - must have trial and failure of 2 formulary medications - naloxone hcl solution 0.4mg/ml injection or 1 mg/ml injection.

## 2017-07-06 ENCOUNTER — OFFICE VISIT (OUTPATIENT)
Dept: INTERNAL MEDICINE CLINIC | Age: 57
End: 2017-07-06

## 2017-07-06 ENCOUNTER — HOSPITAL ENCOUNTER (OUTPATIENT)
Dept: LAB | Age: 57
Discharge: HOME | End: 2017-07-06
Payer: MEDICARE

## 2017-07-06 VITALS
OXYGEN SATURATION: 96 % | HEART RATE: 104 BPM | DIASTOLIC BLOOD PRESSURE: 89 MMHG | BODY MASS INDEX: 32 KG/M2 | HEIGHT: 60 IN | SYSTOLIC BLOOD PRESSURE: 132 MMHG | TEMPERATURE: 99 F | RESPIRATION RATE: 15 BRPM | WEIGHT: 163 LBS

## 2017-07-06 DIAGNOSIS — E11.9 TYPE 2 DIABETES MELLITUS WITHOUT COMPLICATION, WITHOUT LONG-TERM CURRENT USE OF INSULIN (HCC): Chronic | ICD-10-CM

## 2017-07-06 DIAGNOSIS — R30.9 URINARY PAIN: ICD-10-CM

## 2017-07-06 DIAGNOSIS — E11.9 TYPE 2 DIABETES MELLITUS WITHOUT COMPLICATION, WITHOUT LONG-TERM CURRENT USE OF INSULIN (HCC): Primary | Chronic | ICD-10-CM

## 2017-07-06 DIAGNOSIS — I10 ESSENTIAL HYPERTENSION: Chronic | ICD-10-CM

## 2017-07-06 DIAGNOSIS — E78.5 DYSLIPIDEMIA: Chronic | ICD-10-CM

## 2017-07-06 DIAGNOSIS — Z12.11 SCREEN FOR COLON CANCER: ICD-10-CM

## 2017-07-06 DIAGNOSIS — Z76.0 MEDICATION REFILL: ICD-10-CM

## 2017-07-06 LAB
ALBUMIN SERPL BCP-MCNC: 4.2 G/DL (ref 3.4–5)
ALBUMIN/GLOB SERPL: 1.3 {RATIO} (ref 0.8–1.7)
ALP SERPL-CCNC: 69 U/L (ref 45–117)
ALT SERPL-CCNC: 25 U/L (ref 13–56)
ANION GAP BLD CALC-SCNC: 9 MMOL/L (ref 3–18)
AST SERPL W P-5'-P-CCNC: 22 U/L (ref 15–37)
BILIRUB SERPL-MCNC: 0.3 MG/DL (ref 0.2–1)
BILIRUB UR QL STRIP: NEGATIVE
BUN SERPL-MCNC: 11 MG/DL (ref 7–18)
BUN/CREAT SERPL: 9 (ref 12–20)
CALCIUM SERPL-MCNC: 9.4 MG/DL (ref 8.5–10.1)
CHLORIDE SERPL-SCNC: 107 MMOL/L (ref 100–108)
CHOLEST SERPL-MCNC: 113 MG/DL
CO2 SERPL-SCNC: 23 MMOL/L (ref 21–32)
CREAT SERPL-MCNC: 1.18 MG/DL (ref 0.6–1.3)
GLOBULIN SER CALC-MCNC: 3.3 G/DL (ref 2–4)
GLUCOSE SERPL-MCNC: 93 MG/DL (ref 74–99)
GLUCOSE UR-MCNC: NEGATIVE MG/DL
HBA1C MFR BLD: 7.5 % (ref 4.2–5.6)
HDLC SERPL-MCNC: 49 MG/DL (ref 40–60)
HDLC SERPL: 2.3 {RATIO} (ref 0–5)
KETONES P FAST UR STRIP-MCNC: NEGATIVE MG/DL
LDLC SERPL CALC-MCNC: 39.4 MG/DL (ref 0–100)
LIPID PROFILE,FLP: NORMAL
PH UR STRIP: 7 [PH] (ref 4.6–8)
POTASSIUM SERPL-SCNC: 4.5 MMOL/L (ref 3.5–5.5)
PROT SERPL-MCNC: 7.5 G/DL (ref 6.4–8.2)
PROT UR QL STRIP: NEGATIVE MG/DL
SODIUM SERPL-SCNC: 139 MMOL/L (ref 136–145)
SP GR UR STRIP: 1.01 (ref 1–1.03)
TRIGL SERPL-MCNC: 123 MG/DL (ref ?–150)
UA UROBILINOGEN AMB POC: NORMAL (ref 0.2–1)
URINALYSIS CLARITY POC: CLEAR
URINALYSIS COLOR POC: YELLOW
URINE BLOOD POC: NEGATIVE
URINE LEUKOCYTES POC: NEGATIVE
URINE NITRITES POC: NEGATIVE
VLDLC SERPL CALC-MCNC: 24.6 MG/DL

## 2017-07-06 PROCEDURE — 36415 COLL VENOUS BLD VENIPUNCTURE: CPT | Performed by: INTERNAL MEDICINE

## 2017-07-06 PROCEDURE — 82043 UR ALBUMIN QUANTITATIVE: CPT | Performed by: INTERNAL MEDICINE

## 2017-07-06 PROCEDURE — 83036 HEMOGLOBIN GLYCOSYLATED A1C: CPT | Performed by: INTERNAL MEDICINE

## 2017-07-06 PROCEDURE — 80061 LIPID PANEL: CPT | Performed by: INTERNAL MEDICINE

## 2017-07-06 PROCEDURE — 80053 COMPREHEN METABOLIC PANEL: CPT | Performed by: INTERNAL MEDICINE

## 2017-07-06 RX ORDER — METHYLPHENIDATE HYDROCHLORIDE 10 MG/1
10 TABLET ORAL 3 TIMES DAILY
Qty: 90 TAB | Refills: 0 | Status: SHIPPED | OUTPATIENT
Start: 2017-07-06 | End: 2018-04-04 | Stop reason: SDUPTHER

## 2017-07-06 RX ORDER — METHYLPHENIDATE HYDROCHLORIDE 10 MG/1
10 TABLET ORAL 3 TIMES DAILY
Qty: 90 TAB | Refills: 0 | Status: SHIPPED | OUTPATIENT
Start: 2017-07-06 | End: 2018-11-26 | Stop reason: SDUPTHER

## 2017-07-06 RX ORDER — METHYLPHENIDATE HYDROCHLORIDE 10 MG/1
10 TABLET ORAL 3 TIMES DAILY
Qty: 90 TAB | Refills: 0 | Status: SHIPPED | OUTPATIENT
Start: 2017-07-06 | End: 2017-12-26 | Stop reason: SDUPTHER

## 2017-07-06 RX ORDER — LORAZEPAM 1 MG/1
TABLET ORAL
Qty: 60 TAB | Refills: 5 | Status: SHIPPED | OUTPATIENT
Start: 2017-07-06 | End: 2017-12-26 | Stop reason: SDUPTHER

## 2017-07-06 RX ORDER — FLUOROMETHOLONE 1 MG/ML
SOLUTION/ DROPS OPHTHALMIC
Refills: 2 | COMMUNITY
Start: 2017-05-08 | End: 2018-08-22

## 2017-07-06 RX ORDER — PHENAZOPYRIDINE HYDROCHLORIDE 100 MG/1
100 TABLET, FILM COATED ORAL
Qty: 15 TAB | Refills: 0 | Status: SHIPPED | OUTPATIENT
Start: 2017-07-06 | End: 2018-05-04

## 2017-07-06 NOTE — MR AVS SNAPSHOT
Visit Information Date & Time Provider Department Dept. Phone Encounter #  
 7/6/2017  3:30 PM Ryan Pickard, [facility] [phone] [id number] Follow-up Instructions Return in about 3 months (around 10/6/2017) for Well Woman Exam, Med refills, HTN, DM. Your Appointments 7/13/2017  2:40 PM  
Follow Up with Elysia Mejia PA-C [facility] (CLAUDE SCHEDULING) Appt Note: return in 3 months; provider out of office [address] [place], [place] [id number]  
[phone] Shraddha Urban [address] [address] Upcoming Health Maintenance Date Due  
 FOOT EXAM Q1 9/30/2016 COLONOSCOPY 1/20/2017 MICROALBUMIN Q1 4/18/2017 PAP AKA CERVICAL CYTOLOGY 6/30/2017 DTaP/Tdap/Td series (1 - Tdap) 8/23/2017* Pneumococcal 19-64 Medium Risk (1 of 1 - PPSV23) 1/2/2018* INFLUENZA AGE 9 TO ADULT 8/1/2017 HEMOGLOBIN A1C Q6M 10/6/2017 LIPID PANEL Q1 12/15/2017 EYE EXAM RETINAL OR DILATED Q1 3/14/2018 BREAST CANCER SCRN MAMMOGRAM 1/17/2019 *Topic was postponed. The date shown is not the original due date. Allergies as of 7/6/2017  Review Complete On: 7/6/2017 By: Ryan Pickard MD  
  
 Severity Noted Reaction Type Reactions Zanaflex [Tizanidine]  04/18/2016   Side Effect Other (comments) Nausea and worsening headache Current Immunizations  Reviewed on 8/23/2016 Name Date Influenza Vaccine 9/30/2015  2:50 PM  
 Influenza Vaccine (Quad) PF 12/15/2016 Pneumococcal Conjugate (PCV-13) 12/15/2016 Not reviewed this visit You Were Diagnosed With   
  
 Codes Comments Type 2 diabetes mellitus without complication, without long-term current use of insulin (HCC)    -  Primary ICD-10-CM: E11.9 ICD-9-CM: 250.00 Essential hypertension     ICD-10-CM: I10 
ICD-9-CM: 401.9 Dyslipidemia     ICD-10-CM: E78.5 ICD-9-CM: 272.4 Urinary pain     ICD-10-CM: R30.9 ICD-9-CM: 788.1 Medication refill     ICD-10-CM: Z76.0 ICD-9-CM: V68.1 Screen for colon cancer     ICD-10-CM: Z12.11 ICD-9-CM: V76.51 Vitals BP Pulse Temp Resp Height(growth percentile) Weight(growth percentile) 132/89 (!) 104 99 °F (37.2 °C) (Oral) 15 5' (1.524 m) 163 lb (73.9 kg) SpO2 BMI OB Status Smoking Status 96% 31.83 kg/m2 Hysterectomy Never Smoker BMI and BSA Data Body Mass Index Body Surface Area  
 31.83 kg/m 2 1.77 m 2 Preferred Pharmacy Pharmacy Name Phone 0700 [facility], [address] [place] [address] [phone] Your Updated Medication List  
  
   
This list is accurate as of: 7/6/17  4:32 PM.  Always use your most recent med list.  
  
  
  
  
 atenolol 25 mg tablet Commonly known as:  TENORMIN Take 1 Tab by mouth daily. cyclobenzaprine 10 mg tablet Commonly known as:  FLEXERIL  
TAKE 1 TABLET BY MOUTH TWICE DAILY  
  
 fluorometholone 0.1 % ophthalmic suspension Commonly known as: [address] [facility] LQ AND INT 1 GTT IN OU BID LORazepam 1 mg tablet Commonly known as:  ATIVAN  
TAKE 1 TABLET BY MOUTH TWICE DAILY AS NEEDED FOR ANXIETY  
  
 metFORMIN  mg tablet Commonly known as:  GLUCOPHAGE XR Take 1 Tab by mouth daily. * methadone 10 mg tablet Commonly known as:  DOLOPHINE Take 5 tablets in the morning, 4 tablets at noon, and 4 tablets at bedtime for chronic, severe pain. Indications: CHRONIC PAIN, SEVERE PAIN  
  
 * methadone 10 mg tablet Commonly known as:  DOLOPHINE Take 5 tablets in the morning, 4 tablets at noon, and 4 tablets at bedtime for chronic, severe pain. Indications: Chronic Pain, Severe Pain * methadone 10 mg tablet Commonly known as:  DOLOPHINE Take 5 tablets in the morning, 4 tablets at noon, and 4 tablets at bedtime for chronic, severe pain. Indications: Chronic Pain, Severe Pain * methylphenidate 10 mg tablet Commonly known as:  RITALIN Take 1 Tab by mouth three (3) times daily. Max Daily Amount: 30 mg.  
  
 * methylphenidate 10 mg tablet Commonly known as:  RITALIN Take 1 Tab by mouth three (3) times daily. * methylphenidate 10 mg tablet Commonly known as:  RITALIN Take 1 Tab by mouth three (3) times daily. Max Daily Amount: 30 mg.  
  
 naloxone 4 mg/actuation Spry 4 mg by Nasal route as needed for up to 2 doses. Indications: OPIOID TOXICITY NexIUM 40 mg capsule Generic drug:  esomeprazole TK 1 C PO QD AT 3PM  
  
 OTHER  
DDS Lumbar Traction Belt Use as directed  
  
 phenazopyridine 100 mg tablet Commonly known as:  PYRIDIUM Take 1 Tab by mouth three (3) times daily as needed for Pain. promethazine 25 mg tablet Commonly known as:  PHENERGAN Take 1 Tab by mouth every six (6) hours as needed for Nausea. simvastatin 40 mg tablet Commonly known as:  ZOCOR  
TAKE 1 TABLET BY MOUTH EVERY NIGHT AT BEDTIME  
  
 topiramate 50 mg tablet Commonly known as:  TOPAMAX TAKE 3 TABLETS BY MOUTH TWICE DAILY XIIDRA 5 % Dpet Generic drug:  lifitegrast  
  
 * Notice: This list has 6 medication(s) that are the same as other medications prescribed for you. Read the directions carefully, and ask your doctor or other care provider to review them with you. Prescriptions Printed Refills LORazepam (ATIVAN) 1 mg tablet 5 Sig: TAKE 1 TABLET BY MOUTH TWICE DAILY AS NEEDED FOR ANXIETY Class: Print  
 methylphenidate (RITALIN) 10 mg tablet 0 Sig: Take 1 Tab by mouth three (3) times daily. Max Daily Amount: 30 mg.  
 Class: Print Route: Oral  
 methylphenidate (RITALIN) 10 mg tablet 0 Sig: Take 1 Tab by mouth three (3) times daily. Class: Print  Route: Oral  
 methylphenidate (RITALIN) 10 mg tablet 0  
 Sig: Take 1 Tab by mouth three (3) times daily. Max Daily Amount: 30 mg.  
 Class: Print Route: Oral  
  
Prescriptions Sent to Pharmacy Refills  
 phenazopyridine (PYRIDIUM) 100 mg tablet 0 Sig: Take 1 Tab by mouth three (3) times daily as needed for Pain. Class: Normal  
 Pharmacy: [facility] [address], [address]  #: [phone] Route: Oral  
  
We Performed the Following AMB POC URINALYSIS DIP STICK AUTO W/O MICRO [60927 CPT(R)] HM DIABETES FOOT EXAM [HM7 Custom] Follow-up Instructions Return in about 3 months (around 10/6/2017) for Well Woman Exam, Med refills, HTN, DM. To-Do List   
 07/06/2017 Lab:  HEMOGLOBIN A1C W/O EAG   
  
 07/06/2017 Lab:  LIPID PANEL   
  
 07/06/2017 Lab:  METABOLIC PANEL, COMPREHENSIVE   
  
 07/06/2017 Lab:  MICROALBUMIN, UR, RAND W/ MICROALBUMIN/CREA RATIO   
  
 07/06/2017 Lab:  OCCULT BLOOD, IMMUNOASSAY (FIT) Introducing [facility] & HEALTH SERVICES! Mi Maurer introduces [employer] patient portal. Now you can access parts of your medical record, email your doctor's office, and request medication refills online. 1. In your internet browser, go to [email or web address] 2. Click on the First Time User? Click Here link in the Sign In box. You will see the New Member Sign Up page. 3. Enter your [employer] Access Code exactly as it appears below. You will not need to use this code after youve completed the sign-up process. If you do not sign up before the expiration date, you must request a new code. · [employer] Access Code: [email or web address] Expires: 10/4/2017  4:32 PM 
 
4. Enter the last four digits of your Social Security Number (xxxx) and Date of Birth (mm/dd/yyyy) as indicated and click Submit. You will be taken to the next sign-up page. 5. Create a [employer] ID.  This will be your [employer] login ID and cannot be changed, so think of one that is secure and easy to remember. 6. Create a [employer] password. You can change your password at any time. 7. Enter your Password Reset Question and Answer. This can be used at a later time if you forget your password. 8. Enter your e-mail address. You will receive e-mail notification when new information is available in [address] 9. Click Sign Up. You can now view and download portions of your medical record. 10. Click the Download Summary menu link to download a portable copy of your medical information. If you have questions, please visit the Frequently Asked Questions section of the [employer] website. Remember, [employer] is NOT to be used for urgent needs. For medical emergencies, dial 911. Now available from your iPhone and Android! Please provide this summary of care documentation to your next provider. Your primary care clinician is listed as [facility]. If you have any questions after today's visit, please call [phone].

## 2017-07-06 NOTE — PROGRESS NOTES
ROOM # 3    Judit Simpson presents today for   Chief Complaint   Patient presents with    Hypertension    Diabetes    Medication Refill   pt c/o sx of UTI    Judit Simpson preferred language for health care discussion is english/other. Is someone accompanying this pt? no    Is the patient using any DME equipment during OV? no    Depression Screening:  PHQ over the last two weeks 4/6/2017 4/4/2017 8/23/2016 4/18/2016 4/13/2015 4/21/2014 1/27/2014   Little interest or pleasure in doing things Not at all Several days Not at all Not at all Not at all Not at all Several days   Feeling down, depressed or hopeless Not at all Several days Not at all Not at all Not at all Not at all Several days   Total Score PHQ 2 0 2 0 0 0 0 2       Learning Assessment:  Learning Assessment 4/1/2015 2/6/2015 1/27/2014   PRIMARY LEARNER Patient Patient Patient   PRIMARY LANGUAGE ENGLISH ENGLISH ENGLISH   LEARNER PREFERENCE PRIMARY LISTENING LISTENING DEMONSTRATION     - READING -   ANSWERED BY patient patient patient   RELATIONSHIP SELF SELF SELF       Abuse Screening:  Abuse Screening Questionnaire 4/13/2015   Do you ever feel afraid of your partner? N   Are you in a relationship with someone who physically or mentally threatens you? N   Is it safe for you to go home? Y       Fall Risk  Fall Risk Assessment, last 12 mths 1/27/2014   Able to walk? Yes   Fall in past 12 months? No       Health Maintenance reviewed and discussed per provider. Yes    Judit Simpson is due for foot exam, colonoscopy, microalbumin. Please order/place referral if appropriate. Advance Directive:  1. Do you have an advance directive in place? Patient Reply: no    2. If not, would you like material regarding how to put one in place? Patient Reply: no    Coordination of Care:  1. Have you been to the ER, urgent care clinic since your last visit? Hospitalized since your last visit? no    2.  Have you seen or consulted any other health care providers outside of the [address] [place] [address] since your last visit? Include any pap smears or colon screening.  no

## 2017-07-06 NOTE — PROGRESS NOTES
HISTORY OF PRESENT ILLNESS  Ijeoma Allen is a 59 y.o. female. Visit Vitals    /89    Pulse (!) 104    Temp 99 °F (37.2 °C) (Oral)    Resp 15    Ht 5' (1.524 m)    Wt 163 lb (73.9 kg)    SpO2 96%    BMI 31.83 kg/m2       Hypertension    The history is provided by the patient. This is a chronic problem. The current episode started more than 1 week ago. The problem has not changed since onset. Risk factors include obesity, a sedentary lifestyle and hypertension. Diabetes   The history is provided by the patient. This is a chronic problem. The current episode started more than 1 week ago. The problem occurs daily. The problem has not changed since onset. Exacerbated by: diet. The symptoms are relieved by medications (diet). Treatments tried: see med list. The treatment provided moderate relief. Medication Refill   The history is provided by the patient. This is a new problem. Urinary Pain    The history is provided by the patient (stinging after voiding. Some urgency). This is a new problem. The current episode started more than 1 week ago. The problem occurs intermittently. The problem has not changed since onset. There has been no fever. Pertinent negatives include no chills. Review of Systems   Constitutional: Negative for chills and fever. Psychiatric/Behavioral: Positive for depression. Physical Exam   Constitutional: She is oriented to person, place, and time. She appears well-developed and well-nourished. No distress. Cardiovascular: Normal rate and regular rhythm. Pulmonary/Chest: Effort normal and breath sounds normal.   Musculoskeletal: She exhibits no edema. Neurological: She is alert and oriented to person, place, and time.    Diabetic foot exam:     Left: Reflexes 2+     Vibratory sensation normal    Proprioception normal   Sharp/dull discrimination     Filament test normal sensation with micro filament   Pulse DP: 2+ (normal)   Pulse PT:    Deformities: None    Right: Reflexes 2+   Vibratory sensation normal   Proprioception normal   Sharp/dull discrimination    Filament test normal sensation with micro filament   Pulse DP: 2+ (normal)   Pulse PT:    Deformities: Mild - bunion. Skin: Skin is warm and dry. She is not diaphoretic. Psychiatric: She has a normal mood and affect. Nursing note and vitals reviewed. ASSESSMENT and PLAN    ICD-10-CM ICD-9-CM    1. Type 2 diabetes mellitus without complication, without long-term current use of insulin ([facility]) Y43.0 723.12 METABOLIC PANEL, COMPREHENSIVE      LIPID PANEL      HEMOGLOBIN A1C W/O EAG      MICROALBUMIN, UR, RAND W/ MICROALBUMIN/CREA RATIO      HM DIABETES FOOT EXAM   2. Essential hypertension O03 223.6 METABOLIC PANEL, COMPREHENSIVE   3. Dyslipidemia E78.5 272.4 LIPID PANEL   4. Urinary pain R30.9 788.1 AMB POC URINALYSIS DIP STICK AUTO W/O MICRO      phenazopyridine (PYRIDIUM) 100 mg tablet   5. Medication refill Z76.0 V68.1 LORazepam (ATIVAN) 1 mg tablet      methylphenidate (RITALIN) 10 mg tablet      methylphenidate (RITALIN) 10 mg tablet      methylphenidate (RITALIN) 10 mg tablet   6. Screen for colon cancer Z12.11 V76.51 OCCULT BLOOD, IMMUNOASSAY (FIT)     BP controlled    Update lab today     reviewed and appropriate activity noted. No adverse activity noted. Pt had UDS through pain management    Will try pyridium for the urinary sxs--may just be spasm. Her dip was completely clean. Incidentally, Discussed BMI/weight, lifestyle, diet and exercise.  She is aware of the effect on BP, DM, joints    F/U 3 months for WWE with PAP

## 2017-07-07 LAB
CREAT UR-MCNC: 61.66 MG/DL (ref 30–125)
MICROALBUMIN UR-MCNC: 0.2 MG/DL (ref 0–3)
MICROALBUMIN/CREAT UR-RTO: 3 MG/G (ref 0–30)

## 2017-07-24 ENCOUNTER — OFFICE VISIT (OUTPATIENT)
Dept: PAIN MANAGEMENT | Age: 57
End: 2017-07-24

## 2017-07-24 VITALS
WEIGHT: 162 LBS | DIASTOLIC BLOOD PRESSURE: 91 MMHG | BODY MASS INDEX: 31.8 KG/M2 | HEART RATE: 107 BPM | HEIGHT: 60 IN | TEMPERATURE: 99 F | SYSTOLIC BLOOD PRESSURE: 150 MMHG

## 2017-07-24 DIAGNOSIS — M15.9 GENERALIZED OA: ICD-10-CM

## 2017-07-24 DIAGNOSIS — Z79.899 ENCOUNTER FOR LONG-TERM (CURRENT) USE OF HIGH-RISK MEDICATION: ICD-10-CM

## 2017-07-24 DIAGNOSIS — M47.817 LUMBOSACRAL SPONDYLOSIS WITHOUT MYELOPATHY: ICD-10-CM

## 2017-07-24 DIAGNOSIS — M79.7 FIBROMYALGIA: ICD-10-CM

## 2017-07-24 DIAGNOSIS — M51.37 DEGENERATION OF LUMBAR OR LUMBOSACRAL INTERVERTEBRAL DISC: ICD-10-CM

## 2017-07-24 DIAGNOSIS — G40.909 SEIZURE DISORDER (HCC): ICD-10-CM

## 2017-07-24 DIAGNOSIS — G89.4 CHRONIC PAIN SYNDROME: Primary | ICD-10-CM

## 2017-07-24 DIAGNOSIS — M25.50 PAIN IN JOINT, MULTIPLE SITES: ICD-10-CM

## 2017-07-24 RX ORDER — METHADONE HYDROCHLORIDE 10 MG/1
TABLET ORAL
Qty: 360 TAB | Refills: 0 | Status: SHIPPED | OUTPATIENT
Start: 2017-09-15 | End: 2018-05-04

## 2017-07-24 RX ORDER — METHADONE HYDROCHLORIDE 10 MG/1
TABLET ORAL
Qty: 390 TAB | Refills: 0 | Status: SHIPPED | OUTPATIENT
Start: 2017-08-16 | End: 2018-05-04

## 2017-07-24 RX ORDER — METHADONE HYDROCHLORIDE 10 MG/1
TABLET ORAL
Qty: 390 TAB | Refills: 0 | Status: SHIPPED | OUTPATIENT
Start: 2017-09-15 | End: 2018-05-04

## 2017-07-24 RX ORDER — METHADONE HYDROCHLORIDE 10 MG/1
TABLET ORAL
Qty: 360 TAB | Refills: 0 | Status: SHIPPED | OUTPATIENT
Start: 2017-08-16 | End: 2018-05-04

## 2017-07-24 RX ORDER — METHADONE HYDROCHLORIDE 10 MG/1
TABLET ORAL
Qty: 390 TAB | Refills: 0 | Status: SHIPPED | OUTPATIENT
Start: 2017-10-14 | End: 2018-05-04

## 2017-07-24 RX ORDER — METHADONE HYDROCHLORIDE 10 MG/1
TABLET ORAL
Qty: 360 TAB | Refills: 0 | Status: SHIPPED | OUTPATIENT
Start: 2017-10-14 | End: 2018-05-04
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
## 2017-07-24 NOTE — PATIENT INSTRUCTIONS
PLAN / Pt Instructions:  1. Continue current plan with no evidence of addiction or diversion. Stable on current medication without adverse events. 2. Refilled Methadone 10 mg tablet, 4 tablets PO in morning, 4 tablets at noon, and 4 tablets at bedtime. 3. Discussed risks of addiction, dependency, and opioid induced hyperalgesia. 4. Reviewed with patient benefits of home exercise, and lifestyle changes to assist the patient in self-management of symptoms.   5. Return to clinic in 3 months

## 2017-07-24 NOTE — PROGRESS NOTES
Nursing Notes    Patient presents to the office today in follow-up. Patient rates her pain at 7/10 on the numerical pain scale. Reviewed medications with counts as follows:    Rx Date filled Qty Dispensed Pill Count Last Dose Short   Methadone 10mg 07/17/17 390 269 today no                                          Comments:     POC UDS was performed in office today    Any new labs or imaging since last appointment? NO    Have you been to an emergency room (ER) or urgent care clinic since your last visit? NO            Have you been hospitalized since your last visit? NO     If yes, where, when, and reason for visit? Have you seen or consulted any other health care providers outside of the [address] [place] [address]  since your last visit? NO     If yes, where, when, and reason for visit? HM deferred to pcp.

## 2017-07-24 NOTE — MR AVS SNAPSHOT
Visit Information Date & Time Provider Department Dept. Phone Encounter #  
 7/24/2017  2:20 PM Binta Hogue [address] for Pain Management [phone] Follow-up Instructions Return in about 3 months (around 10/24/2017). Your Appointments 10/9/2017  3:30 PM  
WELL WOMAN EXAM with Kenisha Cox MD  
[facility] ([address]) Appt Note: 3 month f/u WWE, HTN, DM  
 [address]  
  
   
 [address] [facility] Upcoming Health Maintenance Date Due COLONOSCOPY 1/20/2017 PAP AKA CERVICAL CYTOLOGY 6/30/2017 DTaP/Tdap/Td series (1 - Tdap) 8/23/2017* Pneumococcal 19-64 Medium Risk (1 of 1 - PPSV23) 1/2/2018* INFLUENZA AGE 9 TO ADULT 8/1/2017 HEMOGLOBIN A1C Q6M 1/6/2018 EYE EXAM RETINAL OR DILATED Q1 3/14/2018 FOOT EXAM Q1 7/6/2018 MICROALBUMIN Q1 7/6/2018 LIPID PANEL Q1 7/6/2018 BREAST CANCER SCRN MAMMOGRAM 1/17/2019 *Topic was postponed. The date shown is not the original due date. Allergies as of 7/24/2017  Review Complete On: 7/24/2017 By: Karlee Clarke PA-C Severity Noted Reaction Type Reactions Zanaflex [Tizanidine]  04/18/2016   Side Effect Other (comments) Nausea and worsening headache Current Immunizations  Reviewed on 8/23/2016 Name Date Influenza Vaccine 9/30/2015  2:50 PM  
 Influenza Vaccine (Quad) PF 12/15/2016 Pneumococcal Conjugate (PCV-13) 12/15/2016 Not reviewed this visit You Were Diagnosed With   
  
 Codes Comments Encounter for long-term (current) use of high-risk medication    -  Primary ICD-10-CM: I70.524 ICD-9-CM: V58.69 Pain in joint, multiple sites     ICD-10-CM: M25.50 ICD-9-CM: 719.49 Vitals BP Pulse Temp Height(growth percentile) Weight(growth percentile) BMI (!) 150/91 (!) 107 99 °F (37.2 °C) 5' (1.524 m) 162 lb (73.5 kg) 31.64 kg/m2 OB Status Smoking Status Hysterectomy Never Smoker BMI and BSA Data Body Mass Index Body Surface Area  
 31.64 kg/m 2 1.76 m 2 Preferred Pharmacy Pharmacy Name Phone [address] [facility], [address] [place] [address] [phone] Your Updated Medication List  
  
   
This list is accurate as of: 7/24/17  3:52 PM.  Always use your most recent med list.  
  
  
  
  
 atenolol 25 mg tablet Commonly known as:  TENORMIN Take 1 Tab by mouth daily. cyclobenzaprine 10 mg tablet Commonly known as:  FLEXERIL  
TAKE 1 TABLET BY MOUTH TWICE DAILY  
  
 fluorometholone 0.1 % ophthalmic suspension Commonly known as: [address] [facility] SHAKE LQ AND INT 1 GTT IN OU BID LORazepam 1 mg tablet Commonly known as:  ATIVAN  
TAKE 1 TABLET BY MOUTH TWICE DAILY AS NEEDED FOR ANXIETY  
  
 metFORMIN  mg tablet Commonly known as:  GLUCOPHAGE XR Take 1 Tab by mouth daily. * methadone 10 mg tablet Commonly known as:  DOLOPHINE Take 5 tablets in the morning, 4 tablets at noon, and 4 tablets at bedtime for chronic, severe pain. Indications: CHRONIC PAIN, SEVERE PAIN  
  
 * methadone 10 mg tablet Commonly known as:  DOLOPHINE Take 5 tablets in the morning, 4 tablets at noon, and 4 tablets at bedtime for chronic, severe pain. Indications: Chronic Pain, Severe Pain * methadone 10 mg tablet Commonly known as:  DOLOPHINE Take 5 tablets in the morning, 4 tablets at noon, and 4 tablets at bedtime for chronic, severe pain. Indications: Chronic Pain, Severe Pain * methadone 10 mg tablet Commonly known as:  DOLOPHINE Take 4 tablets in the morning, 4 tablets at noon, and 4 tablets at bedtime for chronic, severe pain. Indications: Chronic Pain, Severe Pain Start taking on:  8/16/2017 * methadone 10 mg tablet Commonly known as:  DOLOPHINE Take 4 tablets in the morning, 4 tablets at noon, and 4 tablets at bedtime for chronic, severe pain. Indications: Chronic Pain, Severe Pain Start taking on:  8/16/2017 * methadone 10 mg tablet Commonly known as:  DOLOPHINE Take 4 tablets in the morning, 4 tablets at noon, and 4 tablets at bedtime for chronic, severe pain. Indications: Chronic Pain, Severe Pain Start taking on:  9/15/2017 * methadone 10 mg tablet Commonly known as:  DOLOPHINE Take 4 tablets in the morning, 4 tablets at noon, and 4 tablets at bedtime for chronic, severe pain. Indications: Chronic Pain, Severe Pain Start taking on:  9/15/2017 * methadone 10 mg tablet Commonly known as:  DOLOPHINE Take 4 tablets in the morning, 4 tablets at noon, and 4 tablets at bedtime for chronic, severe pain. Indications: Chronic Pain, Severe Pain Start taking on:  10/14/2017 * methadone 10 mg tablet Commonly known as:  DOLOPHINE Take 4 tablets in the morning, 4 tablets at noon, and 4 tablets at bedtime for chronic, severe pain. Indications: Chronic Pain, Severe Pain Start taking on:  10/14/2017 * methylphenidate 10 mg tablet Commonly known as:  RITALIN Take 1 Tab by mouth three (3) times daily. Max Daily Amount: 30 mg.  
  
 * methylphenidate 10 mg tablet Commonly known as:  RITALIN Take 1 Tab by mouth three (3) times daily. * methylphenidate 10 mg tablet Commonly known as:  RITALIN Take 1 Tab by mouth three (3) times daily. Max Daily Amount: 30 mg.  
  
 naloxone 4 mg/actuation Spry 4 mg by Nasal route as needed for up to 2 doses. Indications: OPIOID TOXICITY NexIUM 40 mg capsule Generic drug:  esomeprazole TK 1 C PO QD AT 3PM  
  
 OTHER  
DDS Lumbar Traction Belt Use as directed  
  
 phenazopyridine 100 mg tablet Commonly known as:  PYRIDIUM Take 1 Tab by mouth three (3) times daily as needed for Pain. promethazine 25 mg tablet Commonly known as:  PHENERGAN Take 1 Tab by mouth every six (6) hours as needed for Nausea. simvastatin 40 mg tablet Commonly known as:  ZOCOR  
TAKE 1 TABLET BY MOUTH EVERY NIGHT AT BEDTIME  
  
 topiramate 50 mg tablet Commonly known as:  TOPAMAX TAKE 3 TABLETS BY MOUTH TWICE DAILY XIIDRA 5 % Dpet Generic drug:  lifitegrast  
  
 * Notice: This list has 12 medication(s) that are the same as other medications prescribed for you. Read the directions carefully, and ask your doctor or other care provider to review them with you. Prescriptions Printed Refills  
 methadone (DOLOPHINE) 10 mg tablet 0 Starting on: 10/14/2017 Sig: Take 4 tablets in the morning, 4 tablets at noon, and 4 tablets at bedtime for chronic, severe pain. Indications: Chronic Pain, Severe Pain Class: Print  
 methadone (DOLOPHINE) 10 mg tablet 0 Starting on: 9/15/2017 Sig: Take 4 tablets in the morning, 4 tablets at noon, and 4 tablets at bedtime for chronic, severe pain. Indications: Chronic Pain, Severe Pain Class: Print  
 methadone (DOLOPHINE) 10 mg tablet 0 Starting on: 8/16/2017 Sig: Take 4 tablets in the morning, 4 tablets at noon, and 4 tablets at bedtime for chronic, severe pain. Indications: Chronic Pain, Severe Pain Class: Print  
 methadone (DOLOPHINE) 10 mg tablet 0 Starting on: 10/14/2017 Sig: Take 4 tablets in the morning, 4 tablets at noon, and 4 tablets at bedtime for chronic, severe pain. Indications: Chronic Pain, Severe Pain Class: Print  
 methadone (DOLOPHINE) 10 mg tablet 0 Starting on: 9/15/2017 Sig: Take 4 tablets in the morning, 4 tablets at noon, and 4 tablets at bedtime for chronic, severe pain. Indications: Chronic Pain, Severe Pain Class: Print  
 methadone (DOLOPHINE) 10 mg tablet 0 Starting on: 8/16/2017  Sig: Take 4 tablets in the morning, 4 tablets at noon, and 4 tablets at bedtime for chronic, severe pain. Indications: Chronic Pain, Severe Pain Class: Print We Performed the Following AMB POC DRUG SCREEN () [ [facility]] DRUG SCREEN [[id number] Custom] Follow-up Instructions Return in about 3 months (around 10/24/2017). Patient Instructions PLAN / Pt Instructions: 1. Continue current plan with no evidence of addiction or diversion. Stable on current medication without adverse events. 2. Refilled Methadone 10 mg tablet, 4 tablets PO in morning, 4 tablets at noon, and 4 tablets at bedtime. 3. Discussed risks of addiction, dependency, and opioid induced hyperalgesia. 4. Reviewed with patient benefits of home exercise, and lifestyle changes to assist the patient in self-management of symptoms. 5. Return to clinic in 3 months Introducing [facility]! Hollie Henry introduces [employer] patient portal. Now you can access parts of your medical record, email your doctor's office, and request medication refills online. 1. In your internet browser, go to [email or web address] 2. Click on the First Time User? Click Here link in the Sign In box. You will see the New Member Sign Up page. 3. Enter your [employer] Access Code exactly as it appears below. You will not need to use this code after youve completed the sign-up process. If you do not sign up before the expiration date, you must request a new code. · [employer] Access Code: [email or web address] Expires: 10/4/2017  4:32 PM 
 
4. Enter the last four digits of your Social Security Number (xxxx) and Date of Birth (mm/dd/yyyy) as indicated and click Submit. You will be taken to the next sign-up page. 5. Create a [employer] ID. This will be your [employer] login ID and cannot be changed, so think of one that is secure and easy to remember. 6. Create a [employer] password. You can change your password at any time. 7. Enter your Password Reset Question and Answer. This can be used at a later time if you forget your password. 8. Enter your e-mail address. You will receive e-mail notification when new information is available in [address] 9. Click Sign Up. You can now view and download portions of your medical record. 10. Click the Download Summary menu link to download a portable copy of your medical information. If you have questions, please visit the Frequently Asked Questions section of the [employer] website. Remember, [employer] is NOT to be used for urgent needs. For medical emergencies, dial 911. Now available from your iPhone and Android! Please provide this summary of care documentation to your next provider. Your primary care clinician is listed as [facility]. If you have any questions after today's visit, please call [phone].

## 2017-07-25 LAB
ALCOHOL UR POC: NORMAL
AMPHETAMINES UR POC: NORMAL
BARBITURATES UR POC: NORMAL
BENZODIAZEPINES UR POC: NORMAL
BUPRENORPHINE UR POC: NORMAL
CANNABINOIDS UR POC: NORMAL
CARISOPRODOL UR POC: NORMAL
COCAINE UR POC: NORMAL
FENTANYL UR POC: NORMAL
MDMA/ECSTASY UR POC: NORMAL
METHADONE UR POC: NORMAL
METHAMPHETAMINE UR POC: NORMAL
METHYLPHENIDATE UR POC: NORMAL
OPIATES UR POC: NORMAL
OXYCODONE UR POC: NORMAL
PHENCYCLIDINE UR POC: NORMAL
PROPOXYPHENE UR POC: NORMAL
TRAMADOL UR POC: NORMAL
TRICYCLICS UR POC: NORMAL

## 2017-10-19 RX ORDER — PREDNISONE 10 MG/1
TABLET ORAL
Refills: 1 | OUTPATIENT
Start: 2017-10-19

## 2017-10-19 NOTE — TELEPHONE ENCOUNTER
Called back spoke with patient verified with two identifiers did ask if she was still seeing pain management and she is and I advised that Dr. Manjeet Funes is out of the office and per our other providers Prednisone is not an RX we just prescribe someone without seeing them. Advised that she can come into our walk in clinic tomorrow she stated she doesn't drive her roommate just had surgery. Advised that maybe then she can call her pain management doctor and they might rx it for her but without her being seen our providers don't just prescribe prednisone without seeing them. She stated Dr. Manjeet Funes does I explained that Dr. Manjeet Funes isn't in the office so our other providers will have to see her. Patient hung up.  Looking at med list last refill was 8/17/16

## 2017-10-19 NOTE — TELEPHONE ENCOUNTER
She seems to be followed by pain management and prednisone is not a regular medication of hers. She will need an appointment to be assessed.

## 2017-10-25 ENCOUNTER — OFFICE VISIT (OUTPATIENT)
Dept: PAIN MANAGEMENT | Age: 57
End: 2017-10-25

## 2017-10-25 VITALS
TEMPERATURE: 98 F | RESPIRATION RATE: 18 BRPM | HEIGHT: 61 IN | HEART RATE: 118 BPM | DIASTOLIC BLOOD PRESSURE: 94 MMHG | SYSTOLIC BLOOD PRESSURE: 145 MMHG | WEIGHT: 162 LBS | BODY MASS INDEX: 30.58 KG/M2

## 2017-10-25 DIAGNOSIS — M25.50 PAIN IN JOINT, MULTIPLE SITES: ICD-10-CM

## 2017-10-25 DIAGNOSIS — Z79.899 ENCOUNTER FOR LONG-TERM (CURRENT) USE OF HIGH-RISK MEDICATION: ICD-10-CM

## 2017-10-25 RX ORDER — METHADONE HYDROCHLORIDE 10 MG/1
TABLET ORAL
Qty: 300 TAB | Refills: 0 | Status: SHIPPED | OUTPATIENT
Start: 2017-12-23 | End: 2018-05-04

## 2017-10-25 RX ORDER — METHADONE HYDROCHLORIDE 10 MG/1
TABLET ORAL
Qty: 300 TAB | Refills: 0 | Status: SHIPPED | OUTPATIENT
Start: 2017-11-24 | End: 2018-05-04

## 2017-10-25 NOTE — MR AVS SNAPSHOT
Visit Information Date & Time Provider Department Dept. Phone Encounter #  
 10/25/2017  2:00 PM Andres Baez [facility] [phone] Upcoming Health Maintenance Date Due DTaP/Tdap/Td series (1 - Tdap) 3/18/1979 COLONOSCOPY 1/20/2017 PAP AKA CERVICAL CYTOLOGY 6/30/2017 INFLUENZA AGE 9 TO ADULT 8/1/2017 Pneumococcal 19-64 Medium Risk (1 of 1 - PPSV23) 1/2/2018* HEMOGLOBIN A1C Q6M 1/6/2018 EYE EXAM RETINAL OR DILATED Q1 3/14/2018 FOOT EXAM Q1 7/6/2018 MICROALBUMIN Q1 7/6/2018 LIPID PANEL Q1 7/6/2018 BREAST CANCER SCRN MAMMOGRAM 1/17/2019 *Topic was postponed. The date shown is not the original due date. Allergies as of 10/25/2017  Review Complete On: 10/25/2017 By: Javier Caceres PA-C Severity Noted Reaction Type Reactions Zanaflex [Tizanidine]  04/18/2016   Side Effect Other (comments) Nausea and worsening headache Current Immunizations  Reviewed on 8/23/2016 Name Date Influenza Vaccine 9/30/2015  2:50 PM  
 Influenza Vaccine (Quad) PF 12/15/2016 Pneumococcal Conjugate (PCV-13) 12/15/2016 Not reviewed this visit You Were Diagnosed With   
  
 Codes Comments Encounter for long-term (current) use of high-risk medication     ICD-10-CM: Z79.899 ICD-9-CM: V58.69 Pain in joint, multiple sites     ICD-10-CM: M25.50 ICD-9-CM: 719.49 Vitals BP Pulse Temp Resp Height(growth percentile) Weight(growth percentile) (!) 145/94 (BP 1 Location: Right arm, BP Patient Position: Sitting) (!) 118 98 °F (36.7 °C) (Oral) 18 5' 1\" (1.549 m) 162 lb (73.5 kg) BMI OB Status Smoking Status 30.61 kg/m2 Hysterectomy Never Smoker BMI and BSA Data Body Mass Index Body Surface Area  
 30.61 kg/m 2 1.78 m 2 Preferred Pharmacy Pharmacy Name Phone  [place] [phone] [facility] [phone] Your Updated Medication List  
  
   
This list is accurate as of: 10/25/17  2:38 PM.  Always use your most recent med list.  
  
  
  
  
 atenolol 25 mg tablet Commonly known as:  TENORMIN Take 1 Tab by mouth daily. cyclobenzaprine 10 mg tablet Commonly known as:  FLEXERIL  
TAKE 1 TABLET BY MOUTH TWICE DAILY  
  
 fluorometholone 0.1 % ophthalmic suspension Commonly known as: [address] [facility] SHAKE LQ AND INT 1 GTT IN OU BID LORazepam 1 mg tablet Commonly known as:  ATIVAN  
TAKE 1 TABLET BY MOUTH TWICE DAILY AS NEEDED FOR ANXIETY  
  
 metFORMIN  mg tablet Commonly known as:  GLUCOPHAGE XR Take 1 Tab by mouth daily. * methadone 10 mg tablet Commonly known as:  DOLOPHINE Take 5 tablets in the morning, 4 tablets at noon, and 4 tablets at bedtime for chronic, severe pain. Indications: CHRONIC PAIN, SEVERE PAIN  
  
 * methadone 10 mg tablet Commonly known as:  DOLOPHINE Take 5 tablets in the morning, 4 tablets at noon, and 4 tablets at bedtime for chronic, severe pain. Indications: Chronic Pain, Severe Pain * methadone 10 mg tablet Commonly known as:  DOLOPHINE Take 5 tablets in the morning, 4 tablets at noon, and 4 tablets at bedtime for chronic, severe pain. Indications: Chronic Pain, Severe Pain * methadone 10 mg tablet Commonly known as:  DOLOPHINE Take 4 tablets in the morning, 4 tablets at noon, and 4 tablets at bedtime for chronic, severe pain. Indications: Chronic Pain, Severe Pain * methadone 10 mg tablet Commonly known as:  DOLOPHINE Take 4 tablets in the morning, 4 tablets at noon, and 4 tablets at bedtime for chronic, severe pain. Indications: Chronic Pain, Severe Pain * methadone 10 mg tablet Commonly known as:  DOLOPHINE Take 4 tablets in the morning, 4 tablets at noon, and 4 tablets at bedtime for chronic, severe pain. Indications: Chronic Pain, Severe Pain * methadone 10 mg tablet Commonly known as:  DOLOPHINE Take 4 tablets in the morning, 4 tablets at noon, and 4 tablets at bedtime for chronic, severe pain. Indications: Chronic Pain, Severe Pain * methadone 10 mg tablet Commonly known as:  DOLOPHINE Take 4 tablets in the morning, 4 tablets at noon, and 4 tablets at bedtime for chronic, severe pain. Indications: Chronic Pain, Severe Pain * methadone 10 mg tablet Commonly known as:  DOLOPHINE Take 4 tablets in the morning, 4 tablets at noon, and 4 tablets at bedtime for chronic, severe pain. Indications: Chronic Pain, Severe Pain * methadone 10 mg tablet Commonly known as:  DOLOPHINE Take 4 tablets in the morning, 3 tablets at noon, and 3 tablets at bedtime for chronic, severe pain. Indications: Chronic Pain, Severe Pain Start taking on:  11/24/2017 * methadone 10 mg tablet Commonly known as:  DOLOPHINE Take 5 tablets in the morning, 4 tablets at noon, and 4 tablets at bedtime for chronic, severe pain. Indications: Chronic Pain, Severe Pain Start taking on:  12/23/2017 * methylphenidate HCl 10 mg tablet Commonly known as:  RITALIN Take 1 Tab by mouth three (3) times daily. Max Daily Amount: 30 mg.  
  
 * methylphenidate HCl 10 mg tablet Commonly known as:  RITALIN Take 1 Tab by mouth three (3) times daily. * methylphenidate HCl 10 mg tablet Commonly known as:  RITALIN Take 1 Tab by mouth three (3) times daily. Max Daily Amount: 30 mg.  
  
 naloxone 4 mg/actuation nasal spray Commonly known as:  NARCAN  
4 mg by Nasal route as needed for up to 2 doses. Indications: OPIOID TOXICITY NexIUM 40 mg capsule Generic drug:  esomeprazole TK 1 C PO QD AT 3PM  
  
 OTHER  
DDS Lumbar Traction Belt Use as directed  
  
 phenazopyridine 100 mg tablet Commonly known as:  PYRIDIUM Take 1 Tab by mouth three (3) times daily as needed for Pain. promethazine 25 mg tablet Commonly known as:  PHENERGAN  
TAKE 1 TABLET BY MOUTH EVERY 6 HOURS AS NEEDED FOR NAUSEA  
  
 simvastatin 40 mg tablet Commonly known as:  ZOCOR  
TAKE 1 TABLET BY MOUTH EVERY NIGHT AT BEDTIME  
  
 topiramate 50 mg tablet Commonly known as:  TOPAMAX TAKE 3 TABLETS BY MOUTH TWICE DAILY XIIDRA 5 % Dpet Generic drug:  lifitegrast  
  
 * Notice: This list has 14 medication(s) that are the same as other medications prescribed for you. Read the directions carefully, and ask your doctor or other care provider to review them with you. Prescriptions Printed Refills  
 methadone (DOLOPHINE) 10 mg tablet 0 Starting on: 12/23/2017 Sig: Take 5 tablets in the morning, 4 tablets at noon, and 4 tablets at bedtime for chronic, severe pain. Indications: Chronic Pain, Severe Pain Class: Print  
 methadone (DOLOPHINE) 10 mg tablet 0 Starting on: 11/24/2017 Sig: Take 4 tablets in the morning, 3 tablets at noon, and 3 tablets at bedtime for chronic, severe pain. Indications: Chronic Pain, Severe Pain Class: Print Introducing [facility]! Zahida Brice introduces [employer] patient portal. Now you can access parts of your medical record, email your doctor's office, and request medication refills online. 1. In your internet browser, go to [email or web address] 2. Click on the First Time User? Click Here link in the Sign In box. You will see the New Member Sign Up page. 3. Enter your [employer] Access Code exactly as it appears below. You will not need to use this code after youve completed the sign-up process. If you do not sign up before the expiration date, you must request a new code. · [employer] Access Code: [email or web address] Expires: 1/23/2018  2:38 PM 
 
4. Enter the last four digits of your Social Security Number (xxxx) and Date of Birth (mm/dd/yyyy) as indicated and click Submit. You will be taken to the next sign-up page. 5. Create a [employer] ID. This will be your [employer] login ID and cannot be changed, so think of one that is secure and easy to remember. 6. Create a [employer] password. You can change your password at any time. 7. Enter your Password Reset Question and Answer. This can be used at a later time if you forget your password. 8. Enter your e-mail address. You will receive e-mail notification when new information is available in [address] 9. Click Sign Up. You can now view and download portions of your medical record. 10. Click the Download Summary menu link to download a portable copy of your medical information. If you have questions, please visit the Frequently Asked Questions section of the [employer] website. Remember, [employer] is NOT to be used for urgent needs. For medical emergencies, dial 911. Now available from your iPhone and Android! Please provide this summary of care documentation to your next provider. Your primary care clinician is listed as [facility]. If you have any questions after today's visit, please call [phone].

## 2017-10-25 NOTE — PROGRESS NOTES
Nursing Notes    Patient presents to the office today in follow-up. Patient rates her pain at 10/10 on the numerical pain scale. Reviewed medications with counts as follows:    Rx Date filled Qty Dispensed Pill Count Last Dose Short   Methadone 10 mg 09/24/17 360 6 today no                                        POC UDS was not performed in office today    Any new labs or imaging since last appointment? NO    Have you been to an emergency room (ER) or urgent care clinic since your last visit? NO            Have you been hospitalized since your last visit? NO     If yes, where, when, and reason for visit? Have you seen or consulted any other health care providers outside of the [address] [place] [address]  since your last visit? NO     If yes, where, when, and reason for visit? HM deferred to pcp.

## 2017-10-25 NOTE — PROGRESS NOTES
HISTORY OF PRESENT ILLNESS  Adrian Anderson is a 59 y.o. female    Ms. Jona Szymanski presents for follow up of chronic lower back pain due to lumbar degenerative disc disease and spondylosis. She also suffers with widespread myofascial pain due to fibromyalgia, chronic migraines and chronic trochanteric bursitis. She has a history of hip injections with some improvement. No H/o surgery. H/o acupuncture. H/o of biofeed/meditation at [facility]. PPMHX: Diabetes, HTN, Seizures(2003), Hyperlipidemia and Obesity and Depression. Patient denies any significant changes in her chronic back pain since last visit. She continues to have daily headaches and sometimes migraines lasting up to 3 days. She will be getting her migraine medication from her primary care provider when needed. This patient did well with her methadone taper from last appointment we will continue to slowly taper her methadone dosage. We discussed her current condition and medications in detail today. She tolerates medications without side effects. Adrian Anderson reports no change in sleep or constipation. We discussed that Dr. Kellie Balderrama is no longer working as a provider in this pain management practice. We discussed that this change may involve adjustments to currently prescribed pain medications. This patient is reminded that they have the option to transfer to another pain management clinic if desired. We will provide a neurology referral to continue migraine headache management if needed. This patient understands and verbally agrees. She reports that her mother was murdered by her boyfriend (1990). The mothers boyfriend went to senior living for 7 years out of 27 he was convicted for. She is upset that he is free and her mother is no longer here. She went through psychiatric counseling after this happened. She does not desire a referral to go back to therapy at this time. She states she does not want to rehash all of that.      Medications continue to work well for pain control overall. Lucia Mckeon is tolerating medications well, with no side effects noted. The patient is able to stay more active with less discomfort with medication. Last Ekg (17)  QT/QTc: 360/426    Current medication management consists of: Methadone 10 mg tablets, 4 tablets PO in morning, 3 tablets at noon, and 3 tablets at bedtime. Medications are helping with pain control and quality of life. Her pain is 4/10 with medication and 10/10 without. Pt describes pain as aching, stabbing, and burning. Aggravating factors include standing, sitting, and walking. Relieved with rest, medication, and avoiding painful activities. Current treatment is helping to improve general activity, mood, walking, sleep, enjoyment of life    Measuring clinical outcomes of chronic pain patients: score 15/28; the lower the number the better the outcome. Pain Meds and Quality Of Life have been reviewed. Nonpharmacologic therapy and non-opioid pharmacologic therapy were considered. If opioid therapy is prescribed, this is only if the expected benefits are anticipated to outweigh risks. She  is otherwise doing well with no other complaints today. She denies any adverse events including nausea, vomiting, dizziness, increased constipation, hallucinations, or seizures. The patient reports functional improvement and QOL with pain medication. Vitals:    10/25/17 1359   BP: (!) 145/94   Pulse: (!) 118   Resp: 18   Temp: 98 °F (36.7 °C)   TempSrc: Oral   Weight: 73.5 kg (162 lb)   Height: 5' 1\" (1.549 m)   PainSc:  10 - Worst pain ever   PainLoc: Back         Allergies   Allergen Reactions    Zanaflex [Tizanidine] Other (comments)     Nausea and worsening headache       Past Surgical History:   Procedure Laterality Date    HX  SECTION      X 2    HX COLONOSCOPY      ? f/u    HX PARTIAL HYSTERECTOMY         ROS   Constitutional: Positive for malaise/fatigue. Negative for chills, diaphoresis, fever and weight loss. HENT: Positive for ear pain and sore throat. Negative for congestion, ear discharge and hearing loss. Eyes: Negative for blurred vision, double vision, pain and discharge. Respiratory: Negative for cough, hemoptysis, shortness of breath and wheezing. Cardiovascular: Negative for chest pain, palpitations and leg swelling. Gastrointestinal: Positive for nausea. Negative for constipation, diarrhea, heartburn and vomiting. Genitourinary: Positive for dysuria. Negative for frequency and urgency. Current UTI   Musculoskeletal: Positive for back pain, joint pain, myalgias and neck pain. Negative for falls. Skin: Negative for itching and rash. Neurological: Positive for seizures and headaches. Negative for dizziness, tingling, loss of consciousness and weakness. Vertigo  Last seizure 2003   Psychiatric/Behavioral: Positive for depression. Negative for hallucinations and suicidal ideas. The patient is nervous/anxious and has insomnia.         Physical Exam   Constitutional: She is oriented to person, place, and time and well-developed, well-nourished, and in no distress. Vital signs are normal. No distress. HENT:   Head: Normocephalic and atraumatic. Eyes: Right eye exhibits no discharge. Left eye exhibits no discharge. Neck: Normal range of motion. Musculoskeletal:        Right shoulder: She exhibits tenderness. Left shoulder: She exhibits tenderness. Right hip: She exhibits tenderness. Left hip: She exhibits tenderness. Right knee: Tenderness found. Left knee: Tenderness found. Lumbar back: She exhibits pain. Neurological: She is oriented to person, place, and time. Skin: She is not diaphoretic.        ASSESSMENT:    1. Encounter for long-term (current) use of high-risk medication    2.  Pain in joint, multiple sites          [facility] Program was reviewed which does not demonstrate aberrancies and/or inconsistencies with regard to the historical prescribing of controlled medications to this patient by other providers. Medications were brought to visit today. Pill count was appropriate. When possible, non-drug therapy for chronic pain should be used as a first-line treatment. Physical therapy exercise regimens, chiropractic manipulation, meditation relaxation techniques, cognitive behavior therapy, acupuncture, yoga, Joey Chi,  transcutaneous electrical nerve stimulation (TENS), and application of moist heat can help alleviate pain . Explained that realistic expectations and goals with chronic pain management are to maximize function and minimize pain with the understanding that limitations will exist both in the extent of relief that she may achieve, as well as thresholds of mg strengths that we will not exceed. Our role is to help the patient better cope with chronic pain utilizng a multimodal approach. The patients condition and plan were discussed. All questions were answered. The patient agrees with the plan. PLAN / Pt Instructions:  1. Continue current plan with no evidence of addiction or diversion. 2. Stable on current medication without adverse events. 3. Refilled Methadone 10 mg tablet, 4 tablets PO in morning, 4 tablets at noon, and 4 tablets at bedtime. Patient had one unfilled prescription from last visit that she is due to  in pharmacy today. 4. Discussed risks of addiction, dependency, and opioid induced hyperalgesia. 5. Reviewed with patient benefits of home exercise, and lifestyle changes to assist in self-management of symptoms. 6. Return to clinic in 3 months         Medications Ordered Today   Medications    methadone (DOLOPHINE) 10 mg tablet     Sig: Take 5 tablets in the morning, 4 tablets at noon, and 4 tablets at bedtime for chronic, severe pain.   Indications: Chronic Pain, Severe Pain     Dispense:  300 Tab Refill:  0    methadone (DOLOPHINE) 10 mg tablet     Sig: Take 4 tablets in the morning, 3 tablets at noon, and 3 tablets at bedtime for chronic, severe pain. Indications: Chronic Pain, Severe Pain     Dispense:  300 Tab     Refill:  0         Prescription monitoring program reviewed. Pain medications prescribed with the objective of pain relief and improved physical and psychosocial function in this patient. DISPOSITION  · Counseled patient on proper use of prescribed medications. · Counseled patient about chronic medical conditions and their relationship to anxiety and depression and recommended mental health support as needed. · Reviewed with patient self-help tools, home exercise, and lifestyle changes to assist the patient in self-management of symptoms. · Reviewed with patient the treatment plan, goals of treatment plan, and limitations of treatment plan, to include the potential for side effects from medications and procedures. If side effects occur, it is the responsibility of the patient to inform the clinic so that a change in the treatment plan can be made in a safe manner. The patient is advised that stopping prescribed medication may cause an increase in symptoms and possible medication withdrawal symptoms. The patient is informed an emergency room evaluation may be necessary if this occurs. Spent 25 minutes with patient today reviewing the treatment plan, goals of treatment plan, and limitations of the treatment plan, to include the potential for side effects from medications and procedures. More than 50% of the visit time was spent counseling the patient. Matthew Velasquez PA-C 10/25/2017        Note: Please excuse any typographical errors. Voice recognition software was used for this note and may cause mistakes.

## 2017-12-26 ENCOUNTER — HOSPITAL ENCOUNTER (OUTPATIENT)
Dept: LAB | Age: 57
Discharge: HOME | End: 2017-12-26
Payer: MEDICARE

## 2017-12-26 ENCOUNTER — OFFICE VISIT (OUTPATIENT)
Dept: INTERNAL MEDICINE CLINIC | Age: 57
End: 2017-12-26

## 2017-12-26 VITALS
RESPIRATION RATE: 20 BRPM | TEMPERATURE: 99.1 F | BODY MASS INDEX: 30.13 KG/M2 | WEIGHT: 159.6 LBS | HEIGHT: 61 IN | DIASTOLIC BLOOD PRESSURE: 89 MMHG | SYSTOLIC BLOOD PRESSURE: 142 MMHG | OXYGEN SATURATION: 96 % | HEART RATE: 128 BPM

## 2017-12-26 DIAGNOSIS — Z76.0 MEDICATION REFILL: ICD-10-CM

## 2017-12-26 DIAGNOSIS — Z23 ENCOUNTER FOR IMMUNIZATION: ICD-10-CM

## 2017-12-26 DIAGNOSIS — E78.5 DYSLIPIDEMIA: Chronic | ICD-10-CM

## 2017-12-26 DIAGNOSIS — Z12.11 SCREEN FOR COLON CANCER: ICD-10-CM

## 2017-12-26 DIAGNOSIS — I10 ESSENTIAL HYPERTENSION: Chronic | ICD-10-CM

## 2017-12-26 DIAGNOSIS — E11.9 TYPE 2 DIABETES MELLITUS WITHOUT COMPLICATION, WITHOUT LONG-TERM CURRENT USE OF INSULIN (HCC): Chronic | ICD-10-CM

## 2017-12-26 DIAGNOSIS — G40.909 SEIZURE DISORDER (HCC): ICD-10-CM

## 2017-12-26 DIAGNOSIS — Z00.00 MEDICARE ANNUAL WELLNESS VISIT, SUBSEQUENT: Primary | ICD-10-CM

## 2017-12-26 DIAGNOSIS — Z01.419 WELL WOMAN EXAM WITH ROUTINE GYNECOLOGICAL EXAM: ICD-10-CM

## 2017-12-26 PROCEDURE — 88142 CYTOPATH C/V THIN LAYER: CPT | Performed by: INTERNAL MEDICINE

## 2017-12-26 PROCEDURE — 87624 HPV HI-RISK TYP POOLED RSLT: CPT | Performed by: INTERNAL MEDICINE

## 2017-12-26 RX ORDER — LORAZEPAM 1 MG/1
TABLET ORAL
Qty: 60 TAB | Refills: 5 | Status: SHIPPED | OUTPATIENT
Start: 2017-12-26 | End: 2018-04-04 | Stop reason: SDUPTHER

## 2017-12-26 RX ORDER — METHYLPHENIDATE HYDROCHLORIDE 10 MG/1
10 TABLET ORAL 3 TIMES DAILY
Qty: 90 TAB | Refills: 0 | Status: SHIPPED | OUTPATIENT
Start: 2017-12-26 | End: 2018-04-04 | Stop reason: SDUPTHER

## 2017-12-26 RX ORDER — METHYLPHENIDATE HYDROCHLORIDE 10 MG/1
10 TABLET ORAL 3 TIMES DAILY
Qty: 90 TAB | Refills: 0 | Status: SHIPPED | OUTPATIENT
Start: 2017-12-26 | End: 2018-05-04 | Stop reason: SDUPTHER

## 2017-12-26 RX ORDER — ATENOLOL 25 MG/1
25 TABLET ORAL DAILY
Qty: 90 TAB | Refills: 5 | Status: SHIPPED | OUTPATIENT
Start: 2017-12-26 | End: 2018-04-04 | Stop reason: SDUPTHER

## 2017-12-26 NOTE — MR AVS SNAPSHOT
Visit Information Date & Time Provider Department Dept. Phone Encounter #  
 12/26/2017  1:45 PM Joshuasingh [place] [facility]  Follow-up Instructions Return in about 3 months (around 3/26/2018) for HTN, DM, ADD, Med refills. Your Appointments 1/23/2018  2:20 PM  
Follow Up with Mathieu Villarreal PA-C [address] [place] [address] for Pain Management (CLAUDE SCHEDULING) Appt Note: 3 mon f/u per ko. ..to  
 [address] [address]  
[phone] [facility] [phone] Upcoming Health Maintenance Date Due COLONOSCOPY 1/20/2017 PAP AKA CERVICAL CYTOLOGY 6/30/2017 Influenza Age 5 to Adult 8/1/2017 HEMOGLOBIN A1C Q6M 1/6/2018 Pneumococcal 19-64 Medium Risk (1 of 1 - PPSV23) 1/2/2018* DTaP/Tdap/Td series (1 - Tdap) 12/26/2018* EYE EXAM RETINAL OR DILATED Q1 3/14/2018 FOOT EXAM Q1 7/6/2018 MICROALBUMIN Q1 7/6/2018 LIPID PANEL Q1 7/6/2018 *Topic was postponed. The date shown is not the original due date. Allergies as of 12/26/2017  Review Complete On: 12/26/2017 By: Devan Selby MD  
  
 Severity Noted Reaction Type Reactions Zanaflex [Tizanidine]  04/18/2016   Side Effect Other (comments) Nausea and worsening headache Current Immunizations  Reviewed on 8/23/2016 Name Date Influenza Vaccine 9/30/2015  2:50 PM  
 Influenza Vaccine (Quad) PF  Incomplete, 12/15/2016 Pneumococcal Conjugate (PCV-13) 12/15/2016 Not reviewed this visit You Were Diagnosed With   
  
 Codes Comments Medicare annual wellness visit, subsequent    -  Primary ICD-10-CM: Z00.00 ICD-9-CM: V70.0 Well woman exam with routine gynecological exam     ICD-10-CM: H38.752 ICD-9-CM: V72.31 Screen for colon cancer     ICD-10-CM: Z12.11 ICD-9-CM: V76.51  Type 2 diabetes mellitus without complication, without long-term current use of insulin ([facility] [address]     ICD-10-CM: E11.9 ICD-9-CM: 250.00 Essential hypertension     ICD-10-CM: I10 
ICD-9-CM: 401.9 Dyslipidemia     ICD-10-CM: E78.5 ICD-9-CM: 272.4 Medication refill     ICD-10-CM: Z76.0 ICD-9-CM: V68.1 Encounter for immunization     ICD-10-CM: D69 ICD-9-CM: V03.89 Vitals BP Pulse Temp Resp Height(growth percentile) Weight(growth percentile) 142/89 (BP 1 Location: Right arm, BP Patient Position: Sitting) (!) 128 99.1 °F (37.3 °C) (Oral) 20 5' 1\" (1.549 m) 159 lb 9.6 oz (72.4 kg) SpO2 BMI OB Status Smoking Status 96% 30.16 kg/m2 Hysterectomy Never Smoker Vitals History BMI and BSA Data Body Mass Index Body Surface Area  
 30.16 kg/m 2 1.77 m 2 [phone] [address] [place] [address] Dr [phone] Your Updated Medication List  
  
   
This list is accurate as of: 12/26/17  2:59 PM.  Always use your most recent med list.  
  
  
  
  
 atenolol 25 mg tablet Commonly known as:  TENORMIN Take 1 Tab by mouth daily. cyclobenzaprine 10 mg tablet Commonly known as:  FLEXERIL  
TAKE 1 TABLET BY MOUTH TWICE DAILY  
  
 fluorometholone 0.1 % ophthalmic suspension Commonly known as: [facility] LQ AND INT 1 GTT IN OU BID LORazepam 1 mg tablet Commonly known as:  ATIVAN  
TAKE 1 TABLET BY MOUTH TWICE DAILY AS NEEDED FOR ANXIETY  
  
 metFORMIN  mg tablet Commonly known as:  GLUCOPHAGE XR  
TAKE 1 TABLET BY MOUTH DAILY * methadone 10 mg tablet Commonly known as:  DOLOPHINE Take 5 tablets in the morning, 4 tablets at noon, and 4 tablets at bedtime for chronic, severe pain. Indications: CHRONIC PAIN, SEVERE PAIN  
  
 * methadone 10 mg tablet Commonly known as:  DOLOPHINE Take 5 tablets in the morning, 4 tablets at noon, and 4 tablets at bedtime for chronic, severe pain. Indications: Chronic Pain, Severe Pain * methadone 10 mg tablet Commonly known as:  DOLOPHINE Take 5 tablets in the morning, 4 tablets at noon, and 4 tablets at bedtime for chronic, severe pain. Indications: Chronic Pain, Severe Pain * methadone 10 mg tablet Commonly known as:  DOLOPHINE Take 4 tablets in the morning, 4 tablets at noon, and 4 tablets at bedtime for chronic, severe pain. Indications: Chronic Pain, Severe Pain * methadone 10 mg tablet Commonly known as:  DOLOPHINE Take 4 tablets in the morning, 4 tablets at noon, and 4 tablets at bedtime for chronic, severe pain. Indications: Chronic Pain, Severe Pain * methadone 10 mg tablet Commonly known as:  DOLOPHINE Take 4 tablets in the morning, 4 tablets at noon, and 4 tablets at bedtime for chronic, severe pain. Indications: Chronic Pain, Severe Pain * methadone 10 mg tablet Commonly known as:  DOLOPHINE Take 4 tablets in the morning, 4 tablets at noon, and 4 tablets at bedtime for chronic, severe pain. Indications: Chronic Pain, Severe Pain * methadone 10 mg tablet Commonly known as:  DOLOPHINE Take 4 tablets in the morning, 4 tablets at noon, and 4 tablets at bedtime for chronic, severe pain. Indications: Chronic Pain, Severe Pain * methadone 10 mg tablet Commonly known as:  DOLOPHINE Take 4 tablets in the morning, 4 tablets at noon, and 4 tablets at bedtime for chronic, severe pain. Indications: Chronic Pain, Severe Pain * methadone 10 mg tablet Commonly known as:  DOLOPHINE Take 4 tablets in the morning, 3 tablets at noon, and 3 tablets at bedtime for chronic, severe pain. Indications: Chronic Pain, Severe Pain * methadone 10 mg tablet Commonly known as:  DOLOPHINE Take 5 tablets in the morning, 4 tablets at noon, and 4 tablets at bedtime for chronic, severe pain. Indications: Chronic Pain, Severe Pain * methylphenidate HCl 10 mg tablet Commonly known as:  RITALIN Take 1 Tab by mouth three (3) times daily. Max Daily Amount: 30 mg.  
  
 * methylphenidate HCl 10 mg tablet Commonly known as:  RITALIN Take 1 Tab by mouth three (3) times daily. * methylphenidate HCl 10 mg tablet Commonly known as:  RITALIN Take 1 Tab by mouth three (3) times daily. Max Daily Amount: 30 mg.  
  
 * methylphenidate HCl 10 mg tablet Commonly known as:  RITALIN Take 1 Tab by mouth three (3) times daily. Max Daily Amount: 30 mg.  
  
 * methylphenidate HCl 10 mg tablet Commonly known as:  RITALIN Take 1 Tab by mouth three (3) times daily. naloxone 4 mg/actuation nasal spray Commonly known as:  NARCAN  
4 mg by Nasal route as needed for up to 2 doses. Indications: OPIOID TOXICITY NexIUM 40 mg capsule Generic drug:  esomeprazole TK 1 C PO QD AT 3PM  
  
 OTHER  
DDS Lumbar Traction Belt Use as directed  
  
 phenazopyridine 100 mg tablet Commonly known as:  PYRIDIUM Take 1 Tab by mouth three (3) times daily as needed for Pain. promethazine 25 mg tablet Commonly known as:  PHENERGAN  
TAKE 1 TABLET BY MOUTH EVERY 6 HOURS AS NEEDED FOR NAUSEA  
  
 simvastatin 40 mg tablet Commonly known as:  ZOCOR  
TAKE 1 TABLET BY MOUTH EVERY NIGHT AT BEDTIME  
  
 topiramate 50 mg tablet Commonly known as:  TOPAMAX TAKE 3 TABLETS BY MOUTH TWICE DAILY XIIDRA 5 % Dpet Generic drug:  lifitegrast  
  
 * Notice: This list has 16 medication(s) that are the same as other medications prescribed for you. Read the directions carefully, and ask your doctor or other care provider to review them with you. Prescriptions Printed Refills LORazepam (ATIVAN) 1 mg tablet 5 Sig: TAKE 1 TABLET BY MOUTH TWICE DAILY AS NEEDED FOR ANXIETY Class: Print  
 methylphenidate HCl (RITALIN) 10 mg tablet 0 Sig: Take 1 Tab by mouth three (3) times daily. Max Daily Amount: 30 mg. Class: Print Route: Oral  
 methylphenidate HCl (RITALIN) 10 mg tablet 0 Sig: Take 1 Tab by mouth three (3) times daily. Max Daily Amount: 30 mg.  
 Class: Print Route: Oral  
 methylphenidate HCl (RITALIN) 10 mg tablet 0 Sig: Take 1 Tab by mouth three (3) times daily. Class: Print Route: Oral  
  
Prescriptions Sent to Pharmacy Refills  
 atenolol (TENORMIN) 25 mg tablet 5 Sig: Take 1 Tab by mouth daily. Class: Normal  
 Pharmacy: [facility] [address], [address] [place] [address] #: [phone] Route: Oral  
  
We Performed the Following ADMIN INFLUENZA VIRUS VAC [ [facility]] INFLUENZA VIRUS VAC QUAD,SPLIT,PRESV FREE SYRINGE IM [id number] CPT(R)] Follow-up Instructions Return in about 3 months (around 3/26/2018) for HTN, DM, ADD, Med refills. To-Do List   
 12/26/2017 Lab:  HEMOGLOBIN A1C W/O EAG   
  
 12/26/2017 Lab:  LIPID PANEL   
  
 12/26/2017 Lab:  METABOLIC PANEL, COMPREHENSIVE   
  
 01/25/2018 Lab:  OCCULT BLOOD, IMMUNOASSAY (FIT) Patient Instructions Medicare Wellness Visit, Female The best way to live healthy is to have a healthy lifestyle by eating a well-balanced diet, exercising regularly, limiting alcohol and stopping smoking. Regular physical exams and screening tests are another way to keep healthy. Preventive exams provided by your health care provider can find health problems before they become diseases or illnesses. Preventive services including immunizations, screening tests, monitoring and exams can help you take care of your own health. All people over age 72 should have a pneumovax  and and a prevnar shot to prevent pneumonia. These are once in a lifetime unless you and your provider decide differently. All people over 65 should have a yearly flu shot and a tetanus vaccine every 10 years. A bone mass density to screen for osteoporosis or thinning of the bones should be done every 2 years after 65. Screening for diabetes mellitus with a blood sugar test should be done every year. Glaucoma is a disease of the eye due to increased ocular pressure that can lead to blindness and it should be done every year by an eye professional. 
 
Cardiovascular screening tests that check for elevated lipids (fatty part of blood) which can lead to heart disease and strokes should be done every 5 years. Colorectal screening that evaluates for blood or polyps in your colon should be done yearly as a stool test or every five years as a flexible sigmoidoscope or every 10 years as a colonoscopy up to age 76. Breast cancer screening with a mammogram is recommended biennially  for women age 54-69. Screening for cervical cancer with a pap smear and pelvic exam is recommended for women after age 72 years every 2 years up to age 79 or when the provider and patient decide to stop. If there is a history of cervical abnormalities or other increased risk for cancer then the test is recommended yearly. Hepatitis C screening is also recommended for anyone born between [birth year] through [address]. A shingles vaccine is also recommended once in a lifetime after age 61. Your Medicare Wellness Exam is recommended annually. Here is a list of your current Health Maintenance items with a due date: 
Health Maintenance Due Topic Date Due  
 Colonoscopy  01/20/2017  Cervical Cancer Screening  06/30/2017  Flu Vaccine  08/01/2017  Hemoglobin A1C    01/06/2018 Introducing [facility]! Josef Doty introduces [employer] patient portal. Now you can access parts of your medical record, email your doctor's office, and request medication refills online. 1. In your internet browser, go to [email or web address] 2. Click on the First Time User? Click Here link in the Sign In box.  You will see the New Member Sign Up page. 3. Enter your Calm Access Code exactly as it appears below. You will not need to use this code after youve completed the sign-up process. If you do not sign up before the expiration date, you must request a new code. · Calm Access Code: [email or web address] Expires: 1/23/2018  1:38 PM 
 
4. Enter the last four digits of your Social Security Number (xxxx) and Date of Birth (mm/dd/yyyy) as indicated and click Submit. You will be taken to the next sign-up page. 5. Create a Calm ID. This will be your Calm login ID and cannot be changed, so think of one that is secure and easy to remember. 6. Create a Calm password. You can change your password at any time. 7. Enter your Password Reset Question and Answer. This can be used at a later time if you forget your password. 8. Enter your e-mail address. You will receive e-mail notification when new information is available in [address]. 9. Click Sign Up. You can now view and download portions of your medical record. 10. Click the Download Summary menu link to download a portable copy of your medical information. If you have questions, please visit the Frequently Asked Questions section of the Calm website. Remember, Calm is NOT to be used for urgent needs. For medical emergencies, dial 911. Now available from your iPhone and Android! Please provide this summary of care documentation to your next provider. Your primary care clinician is listed as [facility]. If you have any questions after today's visit, please call [phone].

## 2017-12-26 NOTE — PATIENT INSTRUCTIONS

## 2017-12-26 NOTE — ASSESSMENT & PLAN NOTE
Stable, based on history, physical exam and review of pertinent labs, studies and medications; meds reconciled; continue current treatment plan, lifestyle modifications recommended.   Key Antihyperglycemic Medications             metFORMIN ER (GLUCOPHAGE XR) 750 mg tablet  (Taking) TAKE 1 TABLET BY MOUTH DAILY        Other Key Diabetic Medications             topiramate (TOPAMAX) 50 mg tablet  (Taking) TAKE 3 TABLETS BY MOUTH TWICE DAILY    simvastatin (ZOCOR) 40 mg tablet  (Taking) TAKE 1 TABLET BY MOUTH EVERY NIGHT AT BEDTIME        Lab Results   Component Value Date/Time    Hemoglobin A1c 7.5 07/06/2017 04:38 PM    Glucose 93 07/06/2017 04:38 PM    Creatinine 1.18 07/06/2017 04:38 PM    Microalbumin/Creat ratio (mg/g creat) 3 07/06/2017 04:38 PM    Microalbumin,urine random 0.20 07/06/2017 04:38 PM    Cholesterol, total 113 07/06/2017 04:38 PM    HDL Cholesterol 49 07/06/2017 04:38 PM    LDL, calculated 39.4 07/06/2017 04:38 PM    Triglyceride 123 07/06/2017 04:38 PM     Diabetic Foot and Eye Exam HM Status   Topic Date Due    Eye Exam  03/14/2018    Diabetic Foot Care  07/06/2018

## 2017-12-26 NOTE — ASSESSMENT & PLAN NOTE
This condition is managed by Specialist.  Lab Results   Component Value Date/Time    Creatinine 1.18 07/06/2017 04:38 PM    BUN 11 07/06/2017 04:38 PM    Potassium 4.5 07/06/2017 04:38 PM

## 2017-12-26 NOTE — PROGRESS NOTES
HISTORY OF PRESENT ILLNESS  Eber Kumar is a 59 y.o. female. Visit Vitals    /89 (BP 1 Location: Right arm, BP Patient Position: Sitting)    Pulse (!) 128    Temp 99.1 °F (37.3 °C) (Oral)    Resp 20    Ht 5' 1\" (1.549 m)    Wt 159 lb 9.6 oz (72.4 kg)    SpO2 96%    BMI 30.16 kg/m2       Well Woman   The history is provided by the patient. This is a new problem. Pertinent negatives include no chest pain and no shortness of breath. Diabetes   The history is provided by the patient. This is a chronic problem. The current episode started more than 1 week ago. The problem occurs daily. The problem has not changed since onset. Pertinent negatives include no chest pain and no shortness of breath. Exacerbated by: diet. The symptoms are relieved by medications (diet). Treatments tried: see med list.   Hypertension    The history is provided by the patient. This is a chronic problem. The current episode started more than 1 week ago. The problem has not changed since onset. Associated symptoms include malaise/fatigue. Pertinent negatives include no chest pain, no palpitations and no shortness of breath. There are no associated agents to hypertension. Risk factors include postmenopause, hypertension, stress, obesity and a sedentary lifestyle. Review of Systems   Constitutional: Positive for malaise/fatigue. Negative for chills and fever. Respiratory: Negative for shortness of breath. Cardiovascular: Negative for chest pain and palpitations. Genitourinary: Positive for dysuria. Musculoskeletal: Positive for joint pain. Pain management is trying to taper her methadone. She feels much more pain today that usual   Endo/Heme/Allergies: Positive for polydipsia. Psychiatric/Behavioral: Positive for depression. The patient is nervous/anxious. Physical Exam   Constitutional: She is oriented to person, place, and time. She appears well-developed and well-nourished. No distress. HENT:   Head: Normocephalic and atraumatic. Right Ear: External ear normal.   Left Ear: External ear normal.   Nose: Nose normal.   Mouth/Throat: Oropharynx is clear and moist. No oropharyngeal exudate. Eyes: Conjunctivae and EOM are normal. Pupils are equal, round, and reactive to light. Right eye exhibits no discharge. Left eye exhibits no discharge. No scleral icterus. Neck: Normal range of motion. Neck supple. No JVD present. No tracheal deviation present. No thyromegaly present. Cardiovascular: Normal rate, regular rhythm and normal heart sounds. Exam reveals no gallop. No murmur heard. Pulmonary/Chest: Effort normal and breath sounds normal. No respiratory distress. She has no wheezes. She has no rales. Abdominal: Soft. Bowel sounds are normal. She exhibits no distension and no mass. There is no tenderness. There is no rebound and no guarding. Genitourinary: Vagina normal. No breast swelling, tenderness, discharge or bleeding. Pelvic exam was performed with patient supine. There is no rash, tenderness or lesion on the right labia. There is no rash, tenderness or lesion on the left labia. Cervix exhibits no motion tenderness, no discharge and no friability. Right adnexum displays no mass, no tenderness and no fullness. Left adnexum displays no mass, no tenderness and no fullness. Genitourinary Comments: Vaginal dryness and atrophy   Musculoskeletal: She exhibits no edema or tenderness. Lymphadenopathy:     She has no cervical adenopathy. Neurological: She is alert and oriented to person, place, and time. She has normal strength and normal reflexes. She is not disoriented. No cranial nerve deficit or sensory deficit. She exhibits normal muscle tone. She displays a negative Romberg sign. Coordination and gait normal.            Skin: Skin is warm and dry. No rash noted. She is not diaphoretic. No erythema. Psychiatric: She has a normal mood and affect.  Her speech is normal and behavior is normal. Judgment and thought content normal.   Nursing note and vitals reviewed. ASSESSMENT and PLAN    ICD-10-CM ICD-9-CM                         4. Type 2 diabetes mellitus without complication, without long-term current use of insulin (HCC) G91.2 137.19 METABOLIC PANEL, COMPREHENSIVE      LIPID PANEL      HEMOGLOBIN A1C W/O EAG   5. Essential hypertension W73 303.1 METABOLIC PANEL, COMPREHENSIVE   6. Dyslipidemia E78.5 272.4 LIPID PANEL   7. Medication refill Z76.0 V68.1 atenolol (TENORMIN) 25 mg tablet      LORazepam (ATIVAN) 1 mg tablet      methylphenidate HCl (RITALIN) 10 mg tablet      methylphenidate HCl (RITALIN) 10 mg tablet      methylphenidate HCl (RITALIN) 10 mg tablet       DM has been controlled    BP a little borderline today--but she is in pain. Discussed BMI/weight, lifestyle, diet and exercise. Discussed effect on blood pressure, blood sugar, and joints especially  Focus on limiting white carbs, portion control, and moving more. She has lost some weight this year    Refills as noted    Update lab    F/u 3 months for next refills          The following is a separate encounter visit:    This is a Subsequent Medicare Annual Wellness Exam (AWV) (Performed 12 months after IPPE or effective date of Medicare Part B enrollment)    I have reviewed the patient's medical history in detail and updated the computerized patient record.      History     Past Medical History:   Diagnosis Date    Anxiety     Cataract 6/19/15    bilat    Chronic sialoadenitis     Degenerative disc disease     Depression     Diabetes ([facility] [address] 2012    Dry eye     Dyslipidemia     Fatigue     uses ritalin for this    Fibromyalgia     GERD (gastroesophageal reflux disease)     Headache(784.0)     daily, all her life    Hypertension     OCD (obsessive compulsive disorder)     PTSD (post-traumatic stress disorder)     Seizure disorder ([facility] [address] 2000    Thyroid dysfunction     TMJ (temporomandibular joint disorder) right    Viral encephalitis     unknown type from mosquito      Past Surgical History:   Procedure Laterality Date    HX  SECTION      X 2    HX COLONOSCOPY  2012    ? f/u    HX PARTIAL HYSTERECTOMY       Current Outpatient Prescriptions   Medication Sig Dispense Refill    atenolol (TENORMIN) 25 mg tablet Take 1 Tab by mouth daily. 90 Tab 5    LORazepam (ATIVAN) 1 mg tablet TAKE 1 TABLET BY MOUTH TWICE DAILY AS NEEDED FOR ANXIETY 60 Tab 5    methylphenidate HCl (RITALIN) 10 mg tablet Take 1 Tab by mouth three (3) times daily. Max Daily Amount: 30 mg. 90 Tab 0    methylphenidate HCl (RITALIN) 10 mg tablet Take 1 Tab by mouth three (3) times daily. Max Daily Amount: 30 mg. 90 Tab 0    methylphenidate HCl (RITALIN) 10 mg tablet Take 1 Tab by mouth three (3) times daily. 90 Tab 0    metFORMIN ER (GLUCOPHAGE XR) 750 mg tablet TAKE 1 TABLET BY MOUTH DAILY 90 Tab 1    methadone (DOLOPHINE) 10 mg tablet Take 4 tablets in the morning, 3 tablets at noon, and 3 tablets at bedtime for chronic, severe pain. Indications: Chronic Pain, Severe Pain 300 Tab 0    promethazine (PHENERGAN) 25 mg tablet TAKE 1 TABLET BY MOUTH EVERY 6 HOURS AS NEEDED FOR NAUSEA 60 Tab 1    fluorometholone (FML) 0.1 % ophthalmic suspension SHAKE LQ AND INT 1 GTT IN OU BID  2    methylphenidate (RITALIN) 10 mg tablet Take 1 Tab by mouth three (3) times daily. Max Daily Amount: 30 mg. 90 Tab 0    topiramate (TOPAMAX) 50 mg tablet TAKE 3 TABLETS BY MOUTH TWICE DAILY 540 Tab 5    simvastatin (ZOCOR) 40 mg tablet TAKE 1 TABLET BY MOUTH EVERY NIGHT AT BEDTIME 90 Tab 5    cyclobenzaprine (FLEXERIL) 10 mg tablet TAKE 1 TABLET BY MOUTH TWICE DAILY 180 Tab 5    NEXIUM 40 mg capsule TK 1 C PO QD AT 3PM  11    OTHER DDS Lumbar Traction Belt  Use as directed 1 Device prn    methadone (DOLOPHINE) 10 mg tablet Take 5 tablets in the morning, 4 tablets at noon, and 4 tablets at bedtime for chronic, severe pain.   Indications: Chronic Pain, Severe Pain 300 Tab 0    methadone (DOLOPHINE) 10 mg tablet Take 4 tablets in the morning, 4 tablets at noon, and 4 tablets at bedtime for chronic, severe pain. Indications: Chronic Pain, Severe Pain 390 Tab 0    methadone (DOLOPHINE) 10 mg tablet Take 4 tablets in the morning, 4 tablets at noon, and 4 tablets at bedtime for chronic, severe pain. Indications: Chronic Pain, Severe Pain 390 Tab 0    methadone (DOLOPHINE) 10 mg tablet Take 4 tablets in the morning, 4 tablets at noon, and 4 tablets at bedtime for chronic, severe pain. Indications: Chronic Pain, Severe Pain 390 Tab 0    methadone (DOLOPHINE) 10 mg tablet Take 4 tablets in the morning, 4 tablets at noon, and 4 tablets at bedtime for chronic, severe pain. Indications: Chronic Pain, Severe Pain 360 Tab 0    methadone (DOLOPHINE) 10 mg tablet Take 4 tablets in the morning, 4 tablets at noon, and 4 tablets at bedtime for chronic, severe pain. Indications: Chronic Pain, Severe Pain 360 Tab 0    methadone (DOLOPHINE) 10 mg tablet Take 4 tablets in the morning, 4 tablets at noon, and 4 tablets at bedtime for chronic, severe pain. Indications: Chronic Pain, Severe Pain 360 Tab 0    methylphenidate (RITALIN) 10 mg tablet Take 1 Tab by mouth three (3) times daily. 90 Tab 0    phenazopyridine (PYRIDIUM) 100 mg tablet Take 1 Tab by mouth three (3) times daily as needed for Pain. 15 Tab 0    XIIDRA 5 % dpet       methadone (DOLOPHINE) 10 mg tablet Take 5 tablets in the morning, 4 tablets at noon, and 4 tablets at bedtime for chronic, severe pain. Indications: Chronic Pain, Severe Pain 390 Tab 0    methadone (DOLOPHINE) 10 mg tablet Take 5 tablets in the morning, 4 tablets at noon, and 4 tablets at bedtime for chronic, severe pain. Indications: Chronic Pain, Severe Pain 390 Tab 0    naloxone 4 mg/actuation spry 4 mg by Nasal route as needed for up to 2 doses.  Indications: OPIOID TOXICITY 1 Box 1    methadone (DOLOPHINE) 10 mg tablet Take 5 tablets in the morning, 4 tablets at noon, and 4 tablets at bedtime for chronic, severe pain. Indications: CHRONIC PAIN, SEVERE PAIN 390 Tab 0     Allergies   Allergen Reactions    Zanaflex [Tizanidine] Other (comments)     Nausea and worsening headache     Family History   Problem Relation Age of Onset    Alcohol abuse Father     Diabetes Sister     Migraines Daughter     Diabetes Maternal Aunt     Cancer Maternal Aunt     Diabetes Maternal Uncle     Diabetes Paternal Aunt     Diabetes Paternal Uncle     Heart Attack Maternal Grandmother      Social History   Substance Use Topics    Smoking status: Never Smoker    Smokeless tobacco: Never Used    Alcohol use No     Patient Active Problem List   Diagnosis Code    Degeneration of lumbar or lumbosacral intervertebral disc M51.37    Lumbosacral radiculopathy M54.17    Lumbosacral spondylosis without myelopathy M47.817    Dyslipidemia E78.5    Enthesopathy of hip region M76.899    Essential hypertension I10    Chronic pain G89.29    Fibromyalgia M79.7    Seizure disorder ([facility] [address] G40.909    H/O viral encephalitis Z86.61    Chronic sialoadenitis K11.23    Type 2 diabetes mellitus without complication, without long-term current use of insulin (HCC) E11.9    Generalized OA M15.9       Depression Risk Factor Screening:     PHQ over the last two weeks 12/26/2017   Little interest or pleasure in doing things Several days   Feeling down, depressed or hopeless Several days   Total Score PHQ 2 2     Alcohol Risk Factor Screening: You do not drink alcohol or very rarely. Functional Ability and Level of Safety:   Hearing Loss  Hearing is good. Activities of Daily Living  The home contains: grab [email or web address]  Patient does total self care    Fall Risk  Fall Risk Assessment, last 12 mths 7/24/2017   Able to walk? Yes   Fall in past 12 months?  No       Abuse Screen  Patient is not abused    Cognitive Screening   Evaluation of Cognitive Function:  Has your family/caregiver stated any concerns about your memory: no  Normal    Patient Care Team   Patient Care Team:  John Perez MD as PCP - General (Internal Medicine)  Blake Booth MD as Physician (Neurology)  Manuel Khan MD (Pain Management)  Audra Buenrostro MD as Consulting Provider (Otolaryngology)  Mindi Schilling MD as Physician (Ophthalmology)    Assessment/Plan   Education and counseling provided:  Are appropriate based on today's review and evaluation  Screening Pap and pelvic (covered once every 2 years)  Colorectal cancer screening tests    Diagnoses and all orders for this visit:    1. Medicare annual wellness visit, subsequent    2. Well woman exam with routine gynecological exam  -     PAP + HPV DNA (HIGH RISK); Future    3. Screen for colon cancer  -     OCCULT BLOOD, IMMUNOASSAY (FIT) (06213); Future      8.  Encounter for immunization  -     Administration fee () for Medicare insured patients  -     Influenza virus vaccine (QUADRIVALENT PRES FREE SYRINGE) IM (84270)        Health Maintenance Due   Topic Date Due    COLONOSCOPY  01/20/2017    PAP AKA CERVICAL CYTOLOGY  06/30/2017    Influenza Age 9 to Adult  08/01/2017    HEMOGLOBIN A1C Q6M  01/06/2018

## 2017-12-26 NOTE — PROGRESS NOTES
ROOM # 3    Gage Nieves presents today for   Chief Complaint   Patient presents with    Well Woman     [id number]    Diabetes     3 month f/u    Hypertension     3 month f/u    Medication Refill     Requested Prescriptions     Pending Prescriptions Disp Refills    atenolol (TENORMIN) 25 mg tablet 90 Tab 5     Sig: Take 1 Tab by mouth daily.  LORazepam (ATIVAN) 1 mg tablet 60 Tab 5     Sig: TAKE 1 TABLET BY MOUTH TWICE DAILY AS NEEDED FOR ANXIETY    methylphenidate HCl (RITALIN) 10 mg tablet 90 Tab 0     Sig: Take 1 Tab by mouth three (3) times daily. Max Daily Amount: 30 mg.         Gage Nieves preferred language for health care discussion is english/other. Is someone accompanying this pt? no    Is the patient using any DME equipment during OV? no    Depression Screening:  PHQ over the last two weeks 12/26/2017 7/24/2017 4/6/2017 4/4/2017 8/23/2016 4/18/2016 4/13/2015   Little interest or pleasure in doing things Several days Not at all Not at all Several days Not at all Not at all Not at all   Feeling down, depressed or hopeless Several days Not at all Not at all Several days Not at all Not at all Not at all   Total Score PHQ 2 2 0 0 2 0 0 0       Learning Assessment:  Learning Assessment 10/25/2017 4/1/2015 2/6/2015 1/27/2014   PRIMARY LEARNER Patient Patient Patient Patient   HIGHEST LEVEL OF EDUCATION - PRIMARY LEARNER  DID NOT GRADUATE HIGH SCHOOL - - -   BARRIERS PRIMARY LEARNER NONE - - -   CO-LEARNER CAREGIVER No - - -   [facility]   LEARNER PREFERENCE PRIMARY DEMONSTRATION LISTENING LISTENING DEMONSTRATION     - - READING -   ANSWERED BY self patient patient patient   RELATIONSHIP SELF SELF SELF SELF       Abuse Screening:  Abuse Screening Questionnaire 7/24/2017 4/13/2015   Do you ever feel afraid of your partner? N N   Are you in a relationship with someone who physically or mentally threatens you? N N   Is it safe for you to go home?  Chance Ball Fall Risk  Fall Risk Assessment, last 12 mths 7/24/2017 1/27/2014   Able to walk? Yes Yes   Fall in past 12 months? No No       Health Maintenance reviewed and discussed per provider. Yes    Allyson Bill is due for influenza vax, TDAP vax, colonoscopy, pap smear . Please order/place referral if appropriate. Advance Directive:  1. Do you have an advance directive in place? Patient Reply: no    2. If not, would you like material regarding how to put one in place? Patient Reply: no    Coordination of Care:  1. Have you been to the ER, urgent care clinic since your last visit? Hospitalized since your last visit? no    2. Have you seen or consulted any other health care providers outside of the [address] [place] [address] since your last visit? Include any pap smears or colon screening. no      Immunization History   Administered Date(s) Administered    Influenza Vaccine 09/30/2015    Influenza Vaccine (Quad) PF 12/15/2016, 12/26/2017    Pneumococcal Conjugate (PCV-13) 12/15/2016     Immunization administered 12/26/2017 by Shannon Roth LPN with pt's consent. Patient tolerated procedure well. Pt observed for 10mins. No reactions noted/reported. VIS given.

## 2018-01-29 ENCOUNTER — DOCUMENTATION ONLY (OUTPATIENT)
Dept: PAIN MANAGEMENT | Age: 58
End: 2018-01-29

## 2018-01-29 ENCOUNTER — OFFICE VISIT (OUTPATIENT)
Dept: PAIN MANAGEMENT | Age: 58
End: 2018-01-29

## 2018-01-29 VITALS
RESPIRATION RATE: 18 BRPM | HEART RATE: 104 BPM | BODY MASS INDEX: 30.02 KG/M2 | TEMPERATURE: 98.5 F | WEIGHT: 159 LBS | HEIGHT: 61 IN | DIASTOLIC BLOOD PRESSURE: 84 MMHG | SYSTOLIC BLOOD PRESSURE: 127 MMHG

## 2018-01-29 DIAGNOSIS — M54.17 LUMBOSACRAL RADICULOPATHY: ICD-10-CM

## 2018-01-29 DIAGNOSIS — G43.009 MIGRAINE WITHOUT AURA AND WITHOUT STATUS MIGRAINOSUS, NOT INTRACTABLE: ICD-10-CM

## 2018-01-29 DIAGNOSIS — G89.4 CHRONIC PAIN SYNDROME: ICD-10-CM

## 2018-01-29 DIAGNOSIS — Z79.899 ENCOUNTER FOR LONG-TERM (CURRENT) USE OF HIGH-RISK MEDICATION: ICD-10-CM

## 2018-01-29 DIAGNOSIS — M51.37 DEGENERATION OF LUMBAR OR LUMBOSACRAL INTERVERTEBRAL DISC: Primary | ICD-10-CM

## 2018-01-29 DIAGNOSIS — M15.9 GENERALIZED OA: ICD-10-CM

## 2018-01-29 DIAGNOSIS — M47.817 LUMBOSACRAL SPONDYLOSIS WITHOUT MYELOPATHY: ICD-10-CM

## 2018-01-29 DIAGNOSIS — M79.7 FIBROMYALGIA: ICD-10-CM

## 2018-01-29 DIAGNOSIS — M25.50 PAIN IN JOINT, MULTIPLE SITES: ICD-10-CM

## 2018-01-29 DIAGNOSIS — G40.909 SEIZURE DISORDER (HCC): ICD-10-CM

## 2018-01-29 RX ORDER — METHADONE HYDROCHLORIDE 10 MG/1
TABLET ORAL
Qty: 270 TAB | Refills: 0 | Status: SHIPPED | OUTPATIENT
Start: 2018-03-01 | End: 2018-05-04

## 2018-01-29 RX ORDER — METHADONE HYDROCHLORIDE 10 MG/1
TABLET ORAL
Qty: 270 TAB | Refills: 0 | Status: SHIPPED | OUTPATIENT
Start: 2018-03-31 | End: 2018-05-04

## 2018-01-29 RX ORDER — METHADONE HYDROCHLORIDE 10 MG/1
TABLET ORAL
Qty: 270 TAB | Refills: 0 | Status: SHIPPED | OUTPATIENT
Start: 2018-02-01 | End: 2018-05-04

## 2018-01-29 NOTE — MR AVS SNAPSHOT
[address] [address] 
[phone] Patient: Taran Kumar MRN: [id number] QWB:[id number] Visit Information Date & Time Provider Department Dept. Phone Encounter #  
 1/29/2018 10:20 AM Vana Holter [facility] for [facility] Management [phone] [id number] Your Appointments 3/26/2018  1:45 PM  
Office Visit with Karthik Da Silva MD  
[employer] [facility]) Appt Note: 3 month f/u combo clinic, ADD, med refills [address] [employer] [address]  
  
   
 [address] [facility] Upcoming Health Maintenance Date Due Pneumococcal 19-64 Medium Risk (1 of 1 - PPSV23) 3/18/1977 COLONOSCOPY 1/20/2017 HEMOGLOBIN A1C Q6M 1/6/2018 ZOSTER VACCINE AGE 60> 1/18/2018 DTaP/Tdap/Td series (1 - Tdap) 12/26/2018* EYE EXAM RETINAL OR DILATED Q1 3/14/2018 FOOT EXAM Q1 7/6/2018 MICROALBUMIN Q1 7/6/2018 LIPID PANEL Q1 7/6/2018 BREAST CANCER SCRN MAMMOGRAM 1/17/2019 PAP AKA CERVICAL CYTOLOGY 12/26/2020 *Topic was postponed. The date shown is not the original due date. Allergies as of 1/29/2018  Review Complete On: 1/29/2018 By: Rachel Brunner PA-C Severity Noted Reaction Type Reactions Zanaflex [Tizanidine]  04/18/2016   Side Effect Other (comments) Nausea and worsening headache Current Immunizations  Reviewed on 12/26/2017 Name Date Influenza Vaccine 9/30/2015  2:50 PM  
 Influenza Vaccine (Quad) PF 12/26/2017, 12/15/2016 Pneumococcal Conjugate (PCV-13) 12/15/2016 Not reviewed this visit You Were Diagnosed With   
  
 Codes Comments Degeneration of lumbar or lumbosacral intervertebral disc    -  Primary ICD-10-CM: M51.37 
ICD-9-CM: 722.52 Lumbosacral spondylosis without myelopathy     ICD-10-CM: H07.491 ICD-9-CM: 721.3 Lumbosacral radiculopathy     ICD-10-CM: M54.17 ICD-9-CM: 724.4 Seizure disorder ([address]     ICD-10-CM: G40.909 ICD-9-CM: 345.90 Generalized OA     ICD-10-CM: M15.9 ICD-9-CM: 715.00 Fibromyalgia     ICD-10-CM: M79.7 ICD-9-CM: 729.1 Chronic pain syndrome     ICD-10-CM: G89.4 ICD-9-CM: 338.4 Encounter for long-term (current) use of high-risk medication     ICD-10-CM: Z79.899 ICD-9-CM: V58.69 Migraine without aura and without status migrainosus, not intractable     ICD-10-CM: V13.826 ICD-9-CM: 346.10 Pain in joint, multiple sites     ICD-10-CM: M25.50 ICD-9-CM: 719.49 Vitals BP Pulse Temp Resp Height(growth percentile) Weight(growth percentile) 127/84 (BP 1 Location: Right arm, BP Patient Position: Sitting) (!) 104 98.5 °F (36.9 °C) (Oral) 18 5' 1\" (1.549 m) 159 lb (72.1 kg) BMI OB Status Smoking Status 30.04 kg/m2 Hysterectomy Never Smoker Vitals History BMI and BSA Data Body Mass Index Body Surface Area 30.04 kg/m 2 1.76 m 2 Preferred Pharmacy Pharmacy Name Phone [facility], [address] [place] [address]  [phone] Your Updated Medication List  
  
   
This list is accurate as of: 1/29/18 11:36 AM.  Always use your most recent med list.  
  
  
  
  
 atenolol 25 mg tablet Commonly known as:  TENORMIN Take 1 Tab by mouth daily. cyclobenzaprine 10 mg tablet Commonly known as:  FLEXERIL  
TAKE 1 TABLET BY MOUTH TWICE DAILY  
  
 fluorometholone 0.1 % ophthalmic suspension Commonly known as: [address] [facility] LQ AND INT 1 GTT IN OU BID LORazepam 1 mg tablet Commonly known as:  ATIVAN  
TAKE 1 TABLET BY MOUTH TWICE DAILY AS NEEDED FOR ANXIETY  
  
 metFORMIN  mg tablet Commonly known as:  GLUCOPHAGE XR  
TAKE 1 TABLET BY MOUTH DAILY * methadone 10 mg tablet Commonly known as:  DOLOPHINE  
 Take 5 tablets in the morning, 4 tablets at noon, and 4 tablets at bedtime for chronic, severe pain. Indications: CHRONIC PAIN, SEVERE PAIN  
  
 * methadone 10 mg tablet Commonly known as:  DOLOPHINE Take 5 tablets in the morning, 4 tablets at noon, and 4 tablets at bedtime for chronic, severe pain. Indications: Chronic Pain, Severe Pain * methadone 10 mg tablet Commonly known as:  DOLOPHINE Take 5 tablets in the morning, 4 tablets at noon, and 4 tablets at bedtime for chronic, severe pain. Indications: Chronic Pain, Severe Pain * methadone 10 mg tablet Commonly known as:  DOLOPHINE Take 4 tablets in the morning, 4 tablets at noon, and 4 tablets at bedtime for chronic, severe pain. Indications: Chronic Pain, Severe Pain * methadone 10 mg tablet Commonly known as:  DOLOPHINE Take 4 tablets in the morning, 4 tablets at noon, and 4 tablets at bedtime for chronic, severe pain. Indications: Chronic Pain, Severe Pain * methadone 10 mg tablet Commonly known as:  DOLOPHINE Take 4 tablets in the morning, 4 tablets at noon, and 4 tablets at bedtime for chronic, severe pain. Indications: Chronic Pain, Severe Pain * methadone 10 mg tablet Commonly known as:  DOLOPHINE Take 4 tablets in the morning, 4 tablets at noon, and 4 tablets at bedtime for chronic, severe pain. Indications: Chronic Pain, Severe Pain * methadone 10 mg tablet Commonly known as:  DOLOPHINE Take 4 tablets in the morning, 4 tablets at noon, and 4 tablets at bedtime for chronic, severe pain. Indications: Chronic Pain, Severe Pain * methadone 10 mg tablet Commonly known as:  DOLOPHINE Take 4 tablets in the morning, 4 tablets at noon, and 4 tablets at bedtime for chronic, severe pain. Indications: Chronic Pain, Severe Pain * methadone 10 mg tablet Commonly known as:  DOLOPHINE Take 4 tablets in the morning, 3 tablets at noon, and 3 tablets at bedtime for chronic, severe pain. Indications: Chronic Pain, Severe Pain * methadone 10 mg tablet Commonly known as:  DOLOPHINE Take 5 tablets in the morning, 4 tablets at noon, and 4 tablets at bedtime for chronic, severe pain. Indications: Chronic Pain, Severe Pain * methadone 10 mg tablet Commonly known as:  DOLOPHINE Take 3 tablets in the morning, 3 tablets at noon, and 3 tablets at bedtime for chronic, severe pain. Indications: Chronic Pain, Severe Pain Start taking on:  2/1/2018 * methadone 10 mg tablet Commonly known as:  DOLOPHINE Take 3 tablets in the morning, 3 tablets at noon, and 3 tablets at bedtime for chronic, severe pain. Indications: Chronic Pain, Severe Pain Start taking on:  3/1/2018 * methadone 10 mg tablet Commonly known as:  DOLOPHINE Take 3 tablets in the morning, 3 tablets at noon, and 3 tablets at bedtime for chronic, severe pain. Indications: Chronic Pain, Severe Pain Start taking on:  3/31/2018 * methylphenidate HCl 10 mg tablet Commonly known as:  RITALIN Take 1 Tab by mouth three (3) times daily. Max Daily Amount: 30 mg.  
  
 * methylphenidate HCl 10 mg tablet Commonly known as:  RITALIN Take 1 Tab by mouth three (3) times daily. * methylphenidate HCl 10 mg tablet Commonly known as:  RITALIN Take 1 Tab by mouth three (3) times daily. Max Daily Amount: 30 mg.  
  
 * methylphenidate HCl 10 mg tablet Commonly known as:  RITALIN Take 1 Tab by mouth three (3) times daily. Max Daily Amount: 30 mg.  
  
 * methylphenidate HCl 10 mg tablet Commonly known as:  RITALIN Take 1 Tab by mouth three (3) times daily. naloxone 4 mg/actuation nasal spray Commonly known as:  NARCAN  
4 mg by Nasal route as needed for up to 2 doses. Indications: OPIOID TOXICITY NexIUM 40 mg capsule Generic drug:  esomeprazole TK 1 C PO QD AT 3PM  
  
 OTHER  
DDS Lumbar Traction Belt Use as directed phenazopyridine 100 mg tablet Commonly known as:  PYRIDIUM Take 1 Tab by mouth three (3) times daily as needed for Pain. promethazine 25 mg tablet Commonly known as:  PHENERGAN  
TAKE 1 TABLET BY MOUTH EVERY 6 HOURS AS NEEDED FOR NAUSEA  
  
 simvastatin 40 mg tablet Commonly known as:  ZOCOR  
TAKE 1 TABLET BY MOUTH EVERY NIGHT AT BEDTIME  
  
 topiramate 50 mg tablet Commonly known as:  TOPAMAX TAKE 3 TABLETS BY MOUTH TWICE DAILY XIIDRA 5 % Dpet Generic drug:  lifitegrast  
  
 * Notice: This list has 19 medication(s) that are the same as other medications prescribed for you. Read the directions carefully, and ask your doctor or other care provider to review them with you. Prescriptions Printed Refills  
 methadone (DOLOPHINE) 10 mg tablet 0 Starting on: 3/31/2018 Sig: Take 3 tablets in the morning, 3 tablets at noon, and 3 tablets at bedtime for chronic, severe pain. Indications: Chronic Pain, Severe Pain Class: Print  
 methadone (DOLOPHINE) 10 mg tablet 0 Starting on: 3/1/2018 Sig: Take 3 tablets in the morning, 3 tablets at noon, and 3 tablets at bedtime for chronic, severe pain. Indications: Chronic Pain, Severe Pain Class: Print  
 methadone (DOLOPHINE) 10 mg tablet 0 Starting on: 2/1/2018 Sig: Take 3 tablets in the morning, 3 tablets at noon, and 3 tablets at bedtime for chronic, severe pain. Indications: Chronic Pain, Severe Pain Class: Print We Performed the Following AMB POC DRUG SCREEN () [ [facility]] DRUG SCREEN [[id number] Custom] REFERRAL TO NEUROLOGY [SPJ48 Custom] Comments:  
 Please evaluate pleasant patient for daily headaches and migraines that can last up to three days. She also has history for siezures. Referral Information Referral ID Referred By Referred To  
  
 [id number] Dominga Pindea Not Available Visits Status Start Date End Date 1 New Request 1/29/18 1/29/19 If your referral has a status of pending review or denied, additional information will be sent to support the outcome of this decision. Introducing [facility]! [facility] introduces Stubmatic patient portal. Now you can access parts of your medical record, email your doctor's office, and request medication refills online. 1. In your internet browser, go to [email or web address] 2. Click on the First Time User? Click Here link in the Sign In box. You will see the New Member Sign Up page. 3. Enter your Stubmatic Access Code exactly as it appears below. You will not need to use this code after youve completed the sign-up process. If you do not sign up before the expiration date, you must request a new code. · Stubmatic Access Code: [email or web address] Expires: 4/29/2018 10:23 AM 
 
4. Enter the last four digits of your Social Security Number (xxxx) and Date of Birth (mm/dd/yyyy) as indicated and click Submit. You will be taken to the next sign-up page. 5. Create a Stubmatic ID. This will be your Stubmatic login ID and cannot be changed, so think of one that is secure and easy to remember. 6. Create a Stubmatic password. You can change your password at any time. 7. Enter your Password Reset Question and Answer. This can be used at a later time if you forget your password. 8. Enter your e-mail address. You will receive e-mail notification when new information is available in [address] 9. Click Sign Up. You can now view and download portions of your medical record. 10. Click the Download Summary menu link to download a portable copy of your medical information. If you have questions, please visit the Frequently Asked Questions section of the Stubmatic website. Remember, Stubmatic is NOT to be used for urgent needs. For medical emergencies, dial 911. Now available from your iPhone and Android! Please provide this summary of care documentation to your next provider. Your primary care clinician is listed as [facility]. If you have any questions after today's visit, please call [phone].

## 2018-01-29 NOTE — PROGRESS NOTES
Nursing Notes    Patient presents to the office today in follow-up. Patient rates her pain at 7/10 on the numerical pain scale. Reviewed medications with counts as follows:    Rx Date filled Qty Dispensed Pill Count Last Dose Short   Methadone 10 mg 01/02/18 300 26 This a.m no                                        POC UDS was performed in office today    Any new labs or imaging since last appointment? NO    Have you been to an emergency room (ER) or urgent care clinic since your last visit? NO            Have you been hospitalized since your last visit? NO     If yes, where, when, and reason for visit? Have you seen or consulted any other health care providers outside of the [address] [place] [address]  since your last visit? NO     If yes, where, when, and reason for visit? HM deferred to pcp.

## 2018-01-29 NOTE — PROGRESS NOTES
HISTORY OF PRESENT ILLNESS  Moreno Dumont is a 59 y.o. female    Ms. Viridiana Meza presents for follow up of chronic lower back pain due to lumbar degenerative disc disease and spondylosis. She also suffers with widespread myofascial pain due to fibromyalgia, chronic migraines and chronic trochanteric bursitis. She has a history of hip injections with some improvement. No H/o surgery. H/o acupuncture. H/o of biofeed/meditation at [facility]. PPMHX: Diabetes, HTN, Seizures(2003), Hyperlipidemia and Obesity and Depression. This pleasant patient denies any significant changes in her chronic back pain since last visit. She continues to have daily headaches and sometimes migraines lasting up to 3 days. I will provide a referral to neurology for her to be followed for her constant headaches  This patient continues to do well with her methadone taper from last appointment we will continue to slowly taper her methadone dosage. She reports that her roommate is an 79-year-old lady who recently had a right hip replacement and left hip revision. She reports her roommate has been through a lot and this puts a lot on her and she is trying to help this woman through her rehab. We discussed her current condition and medications in detail today. She tolerates medications without side effects. Moreno Dumont reports no change in sleep or constipation. Last Ekg (1/4/17)  QT/QTc: 360/426     Current medication management consists of: Methadone 9 mg tablets, 3 tablets in morning, 3 tablets at noon, and 3 tablets at bedtime. Medications are helping with pain control and quality of life. Her pain is 4/10 with medication and 10/10 without. Pt describes pain as aching, stabbing, and burning. Aggravating factors include standing, sitting, and walking. Relieved with rest, medication, and avoiding painful activities.  Current treatment is helping to improve general activity, mood, walking, sleep, enjoyment of life    Measuring clinical outcomes of chronic pain patients: score 13/28; the lower the number the better the outcome. Pain Meds and Quality Of Life have been reviewed. Nonpharmacologic therapy and non-opioid pharmacologic therapy were considered. If opioid therapy is prescribed, this is only if the expected benefits are anticipated to outweigh risks. She  is otherwise doing well with no other complaints today. She denies any adverse events including nausea, vomiting, dizziness, increased constipation, hallucinations, or seizures. The patient reports functional improvement and QOL with pain medication. Vitals:    18 1051   BP: 127/84   Pulse: (!) 104   Resp: 18   Temp: 98.5 °F (36.9 °C)   TempSrc: Oral   Weight: 72.1 kg (159 lb)   Height: 5' 1\" (1.549 m)   PainSc:   7   PainLoc: Head         Allergies   Allergen Reactions    Zanaflex [Tizanidine] Other (comments)     Nausea and worsening headache       Past Surgical History:   Procedure Laterality Date    HX  SECTION      X 2    HX COLONOSCOPY      ? f/u    HX PARTIAL HYSTERECTOMY         ROS   Constitutional: Positive for malaise/fatigue. Negative for chills, diaphoresis, fever and weight loss. HENT: Positive for ear pain and sore throat. Negative for congestion, ear discharge and hearing loss.    Eyes: Negative for blurred vision, double vision, pain and discharge. Respiratory: Negative for cough, hemoptysis, shortness of breath and wheezing.    Cardiovascular: Negative for chest pain, palpitations and leg swelling. Gastrointestinal: Positive for nausea. Negative for constipation, diarrhea, heartburn and vomiting. Musculoskeletal: Positive for back pain, joint pain, myalgias and neck pain. Negative for falls. Skin: Negative for itching and rash. Neurological: Positive for seizures and headaches.  Negative for dizziness, tingling, loss of consciousness and weakness.        Vertigo  Last seizure    Psychiatric/Behavioral: Positive for depression. Negative for hallucinations and suicidal ideas. The patient is nervous/anxious and has insomnia.        Physical Exam   Constitutional: She is oriented to person, place, and time and well-developed, well-nourished, and in no distress. Vital signs are normal. No distress. HENT:   Head: Normocephalic and atraumatic. Eyes: Right eye exhibits no discharge. Left eye exhibits no discharge. Neck: Normal range of motion. Musculoskeletal:        Right shoulder: She exhibits tenderness.        Left shoulder: She exhibits tenderness.        Right hip: She exhibits tenderness.        Left hip: She exhibits tenderness.        Right knee: Tenderness found.        Left knee: Tenderness found.        Lumbar back: She exhibits pain. Neurological: She is oriented to person, place, and time. Skin: She is not diaphoretic.        ASSESSMENT:    1. Degeneration of lumbar or lumbosacral intervertebral disc    2. Lumbosacral spondylosis without myelopathy    3. Lumbosacral radiculopathy    4. Seizure disorder ([address]    5. Generalized OA    6. Fibromyalgia    7. Chronic pain syndrome    8. Encounter for long-term (current) use of high-risk medication    9. Migraine without aura and without status migrainosus, not intractable    10. Pain in joint, multiple sites          [address] Program was reviewed which does not demonstrate aberrancies and/or inconsistencies with regard to the historical prescribing of controlled medications to this patient by other providers. Medications were brought to visit today. Pill count was appropriate. When possible, non-drug therapy for chronic pain should be used as a first-line treatment.  Physical therapy exercise regimens, chiropractic manipulation, meditation relaxation techniques, cognitive behavior therapy, acupuncture, yoga, Joey Chi,  transcutaneous electrical nerve stimulation (TENS), and application of moist heat can help alleviate pain .     Explained that realistic expectations and goals with chronic pain management are to maximize function and minimize pain with the understanding that limitations will exist both in the extent of relief that she may achieve, as well as thresholds of mg strengths that we will not exceed. Our role is to help the patient better cope with chronic pain utilizng a multimodal approach. The patients condition and plan were discussed. All questions were answered. The patient agrees with the plan. PLAN / Pt Instructions:  1. Modify current plan with no evidence of addiction or diversion. 2. Stable on current medication without adverse events. 3. Refill and adjust down Methadone 10 mg tablet, 3 tablets in morning, 3 tablets at noon, and 3 tablets at bedtime. 4. Provided neurology consult to continue migraine care   5. Discussed risks of addiction, dependency, and opioid induced hyperalgesia. 6. Reviewed benefits of home exercise, and lifestyle changes to assist in self-management of symptoms. 7. Return to clinic in 3 months or sooner if needed       Prescription monitoring program reviewed. POC UDS today. Pain medications prescribed with the objective of pain relief and improved physical and psychosocial function in this patient. DISPOSITION  · Counseled patient on proper use of prescribed medications. · Counseled patient about chronic medical conditions and their relationship to anxiety and depression and recommended mental health support as needed. · Reviewed with patient self-help tools, home exercise, and lifestyle changes to assist the patient in self-management of symptoms. · Reviewed with patient the treatment plan, goals of treatment plan, and limitations of treatment plan, to include the potential for side effects from medications and procedures.   If side effects occur, it is the responsibility of the patient to inform the clinic so that a change in the treatment plan can be made in a safe manner. The patient is advised that stopping prescribed medication may cause an increase in symptoms and possible medication withdrawal symptoms. The patient is informed an emergency room evaluation may be necessary if this occurs. Spent 25 minutes with patient today reviewing the treatment plan, goals of treatment plan, and limitations of the treatment plan, to include the potential for side effects from medications and procedures. More than 50% of the visit time was spent counseling the patient. Debbi Quintana PA-C 1/29/2018        Note: Please excuse any typographical errors. Voice recognition software was used for this note and may cause mistakes.

## 2018-01-29 NOTE — PROGRESS NOTES
Fax a referral to [facility] for neurology. Conformation received.

## 2018-04-04 ENCOUNTER — HOSPITAL ENCOUNTER (OUTPATIENT)
Dept: LAB | Age: 58
Discharge: HOME | End: 2018-04-04
Payer: MEDICARE

## 2018-04-04 ENCOUNTER — OFFICE VISIT (OUTPATIENT)
Dept: INTERNAL MEDICINE CLINIC | Age: 58
End: 2018-04-04

## 2018-04-04 VITALS
BODY MASS INDEX: 30.36 KG/M2 | SYSTOLIC BLOOD PRESSURE: 146 MMHG | WEIGHT: 160.8 LBS | HEART RATE: 114 BPM | HEIGHT: 61 IN | RESPIRATION RATE: 22 BRPM | TEMPERATURE: 99.5 F | DIASTOLIC BLOOD PRESSURE: 89 MMHG | OXYGEN SATURATION: 97 %

## 2018-04-04 DIAGNOSIS — E11.9 TYPE 2 DIABETES MELLITUS WITHOUT COMPLICATION, WITHOUT LONG-TERM CURRENT USE OF INSULIN (HCC): Primary | Chronic | ICD-10-CM

## 2018-04-04 DIAGNOSIS — I10 ESSENTIAL HYPERTENSION: Chronic | ICD-10-CM

## 2018-04-04 DIAGNOSIS — E11.9 TYPE 2 DIABETES MELLITUS WITHOUT COMPLICATION, WITHOUT LONG-TERM CURRENT USE OF INSULIN (HCC): Chronic | ICD-10-CM

## 2018-04-04 DIAGNOSIS — Z76.0 MEDICATION REFILL: ICD-10-CM

## 2018-04-04 DIAGNOSIS — E78.5 DYSLIPIDEMIA: Chronic | ICD-10-CM

## 2018-04-04 LAB
ALBUMIN SERPL-MCNC: 4.1 G/DL (ref 3.4–5)
ALBUMIN/GLOB SERPL: 1.3 {RATIO} (ref 0.8–1.7)
ALP SERPL-CCNC: 61 U/L (ref 45–117)
ALT SERPL-CCNC: 23 U/L (ref 13–56)
ANION GAP SERPL CALC-SCNC: 12 MMOL/L (ref 3–18)
AST SERPL-CCNC: 13 U/L (ref 15–37)
BILIRUB SERPL-MCNC: 0.2 MG/DL (ref 0.2–1)
BUN SERPL-MCNC: 13 MG/DL (ref 7–18)
BUN/CREAT SERPL: 11 (ref 12–20)
CALCIUM SERPL-MCNC: 8.9 MG/DL (ref 8.5–10.1)
CHLORIDE SERPL-SCNC: 107 MMOL/L (ref 100–108)
CHOLEST SERPL-MCNC: 107 MG/DL
CO2 SERPL-SCNC: 22 MMOL/L (ref 21–32)
CREAT SERPL-MCNC: 1.18 MG/DL (ref 0.6–1.3)
GLOBULIN SER CALC-MCNC: 3.1 G/DL (ref 2–4)
GLUCOSE SERPL-MCNC: 112 MG/DL (ref 74–99)
HBA1C MFR BLD: 7.9 % (ref 4.2–5.6)
HDLC SERPL-MCNC: 44 MG/DL (ref 40–60)
HDLC SERPL: 2.4 {RATIO} (ref 0–5)
LDLC SERPL CALC-MCNC: 33.4 MG/DL (ref 0–100)
LIPID PROFILE,FLP: NORMAL
POTASSIUM SERPL-SCNC: 4.1 MMOL/L (ref 3.5–5.5)
PROT SERPL-MCNC: 7.2 G/DL (ref 6.4–8.2)
SODIUM SERPL-SCNC: 141 MMOL/L (ref 136–145)
TRIGL SERPL-MCNC: 148 MG/DL (ref ?–150)
VLDLC SERPL CALC-MCNC: 29.6 MG/DL

## 2018-04-04 PROCEDURE — 36415 COLL VENOUS BLD VENIPUNCTURE: CPT | Performed by: INTERNAL MEDICINE

## 2018-04-04 PROCEDURE — 80061 LIPID PANEL: CPT | Performed by: INTERNAL MEDICINE

## 2018-04-04 PROCEDURE — 83036 HEMOGLOBIN GLYCOSYLATED A1C: CPT | Performed by: INTERNAL MEDICINE

## 2018-04-04 PROCEDURE — 80053 COMPREHEN METABOLIC PANEL: CPT | Performed by: INTERNAL MEDICINE

## 2018-04-04 RX ORDER — METFORMIN HYDROCHLORIDE 750 MG/1
750 TABLET, EXTENDED RELEASE ORAL DAILY
Qty: 90 TAB | Refills: 4 | Status: SHIPPED | OUTPATIENT
Start: 2018-04-04 | End: 2018-05-04

## 2018-04-04 RX ORDER — METHYLPHENIDATE HYDROCHLORIDE 10 MG/1
10 TABLET ORAL 3 TIMES DAILY
Qty: 90 TAB | Refills: 0 | Status: SHIPPED | OUTPATIENT
Start: 2018-04-04 | End: 2018-05-04 | Stop reason: SDUPTHER

## 2018-04-04 RX ORDER — ATENOLOL 25 MG/1
25 TABLET ORAL DAILY
Qty: 90 TAB | Refills: 5 | Status: SHIPPED | OUTPATIENT
Start: 2018-04-04 | End: 2018-11-26 | Stop reason: SDUPTHER

## 2018-04-04 RX ORDER — PROMETHAZINE HYDROCHLORIDE 25 MG/1
25 TABLET ORAL
Qty: 60 TAB | Refills: 5 | Status: SHIPPED | OUTPATIENT
Start: 2018-04-04 | End: 2019-06-13 | Stop reason: SDUPTHER

## 2018-04-04 RX ORDER — LORAZEPAM 1 MG/1
TABLET ORAL
Qty: 60 TAB | Refills: 5 | Status: SHIPPED | OUTPATIENT
Start: 2018-04-04 | End: 2018-10-25 | Stop reason: SDUPTHER

## 2018-04-04 RX ORDER — CYCLOBENZAPRINE HCL 10 MG
TABLET ORAL
Qty: 180 TAB | Refills: 5 | Status: SHIPPED | OUTPATIENT
Start: 2018-04-04 | End: 2018-04-23 | Stop reason: SDUPTHER

## 2018-04-04 RX ORDER — TOPIRAMATE 50 MG/1
150 TABLET, FILM COATED ORAL 2 TIMES DAILY
Qty: 540 TAB | Refills: 4 | Status: SHIPPED | OUTPATIENT
Start: 2018-04-04 | End: 2018-07-09 | Stop reason: SDUPTHER

## 2018-04-04 RX ORDER — SIMVASTATIN 40 MG/1
TABLET, FILM COATED ORAL
Qty: 90 TAB | Refills: 5 | Status: SHIPPED | OUTPATIENT
Start: 2018-04-04 | End: 2018-07-09 | Stop reason: SDUPTHER

## 2018-04-04 NOTE — PROGRESS NOTES
ROOM # 4  Pt presents today for medication refills. Pt states no complications/issues since last visit. Yesika Odell presents today for   Chief Complaint   Patient presents with    Hypertension    Diabetes       Yesika Odell preferred language for health care discussion is english/other. Is someone accompanying this pt? no    Is the patient using any DME equipment during OV? no    Depression Screening:  PHQ over the last two weeks 4/4/2018 1/29/2018 12/26/2017 7/24/2017 4/6/2017 4/4/2017 8/23/2016   Little interest or pleasure in doing things Not at all Not at all Several days Not at all Not at all Several days Not at all   Feeling down, depressed or hopeless Not at all Not at all Several days Not at all Not at all Several days Not at all   Total Score PHQ 2 0 0 2 0 0 2 0       Learning Assessment:  Learning Assessment 1/29/2018 10/25/2017 4/1/2015 2/6/2015 1/27/2014   PRIMARY LEARNER Patient Patient Patient Patient Patient   HIGHEST LEVEL OF EDUCATION - PRIMARY LEARNER  DID NOT GRADUATE HIGH SCHOOL DID NOT GRADUATE HIGH SCHOOL - - -   BARRIERS PRIMARY LEARNER NONE NONE - - -   CO-LEARNER CAREGIVER No No - - -   [address]   LEARNER PREFERENCE PRIMARY DEMONSTRATION DEMONSTRATION LISTENING LISTENING DEMONSTRATION     - - - READING -   ANSWERED BY self self patient patient patient   RELATIONSHIP SELF SELF SELF SELF SELF       Abuse Screening:  Abuse Screening Questionnaire 1/29/2018 7/24/2017 4/13/2015   Do you ever feel afraid of your partner? N N N   Are you in a relationship with someone who physically or mentally threatens you? N N N   Is it safe for you to go home? Linda Li       Fall Risk  Fall Risk Assessment, last 12 mths 4/4/2018 1/29/2018 7/24/2017 1/27/2014   Able to walk? Yes Yes Yes Yes   Fall in past 12 months? No No No No       Health Maintenance reviewed and discussed per provider.  Yes    Yesika Odell is due for   Health Maintenance Due   Topic Date Due    Pneumococcal 19-64 Medium Risk (1 of 1 - PPSV23) 03/18/1979    COLONOSCOPY  01/20/2017    HEMOGLOBIN A1C Q6M  01/06/2018    ZOSTER VACCINE AGE 60>  01/18/2018    EYE EXAM RETINAL OR DILATED Q1  03/14/2018     Please order/place referral if appropriate. Advance Directive:  1. Do you have an advance directive in place? Patient Reply: no    2. If not, would you like material regarding how to put one in place? Patient Reply: yes, material given    Coordination of Care:  1. Have you been to the ER, urgent care clinic since your last visit? Hospitalized since your last visit? no    2. Have you seen or consulted any other health care providers outside of the [address] [place] [address] since your last visit? Include any pap smears or colon screening.  no

## 2018-04-04 NOTE — MR AVS SNAPSHOT
Joao Brandt 
 
 
 [address] [facility] 83 [id number] 
[phone] Patient: Deepak Linares MRN: [id number] QBY:[id number] Visit Information Date & Time Provider Department Dept. Phone Encounter #  
 4/4/2018  3:15 PM [address] [facility] [phone] Follow-up Instructions Return in about 3 months (around 7/4/2018) for HTN, DM, cholesterol, Med refills. Your Appointments 5/1/2018  2:40 PM  
Follow Up with Saige Cabrera PA-C [address] for Pain Management (CLAUDE SCHEDULING) Appt Note: return in 3 months [address] [address]  
[phone] [facility] [phone] [id number] Upcoming Health Maintenance Date Due COLONOSCOPY 1/20/2017 HEMOGLOBIN A1C Q6M 1/6/2018 EYE EXAM RETINAL OR DILATED Q1 7/3/2018* Pneumococcal 19-64 Medium Risk (1 of 1 - PPSV23) 10/1/2018* DTaP/Tdap/Td series (1 - Tdap) 12/26/2018* ZOSTER VACCINE AGE 60> 4/4/2019* FOOT EXAM Q1 7/6/2018 MICROALBUMIN Q1 7/6/2018 LIPID PANEL Q1 7/6/2018 MEDICARE YEARLY EXAM 12/27/2018 BREAST CANCER SCRN MAMMOGRAM 1/17/2019 PAP AKA CERVICAL CYTOLOGY 12/26/2020 *Topic was postponed. The date shown is not the original due date. Allergies as of 4/4/2018  Review Complete On: 4/4/2018 By: Janette Valenzuela MD  
  
 Severity Noted Reaction Type Reactions Zanaflex [Tizanidine]  04/18/2016   Side Effect Other (comments) Nausea and worsening headache Current Immunizations  Reviewed on 4/3/2018 Name Date Influenza Vaccine 9/30/2015  2:50 PM  
 Influenza Vaccine (Quad) PF 12/26/2017, 12/15/2016 Pneumococcal Conjugate (PCV-13) 12/15/2016 Not reviewed this visit You Were Diagnosed With   
  
 Codes Comments  Type 2 diabetes mellitus without complication, without long-term current use of insulin ([facility].)    -  Primary ICD-10-CM: E11.9 ICD-9-CM: 250.00 Essential hypertension     ICD-10-CM: I10 
ICD-9-CM: 401.9 Medication refill     ICD-10-CM: Z76.0 ICD-9-CM: V68.1 Vitals BP Pulse Temp Resp Height(growth percentile) Weight(growth percentile) 146/89 (!) 114 99.5 °F (37.5 °C) (Oral) 22 5' 1\" (1.549 m) 160 lb 12.8 oz (72.9 kg) SpO2 BMI OB Status Smoking Status 97% 30.38 kg/m2 Hysterectomy Never Smoker Vitals History BMI and BSA Data Body Mass Index Body Surface Area  
 30.38 kg/m 2 1.77 m 2 [facility] Name [facility] [address] [place] [address]  [phone] Your Updated Medication List  
  
   
This list is accurate as of 4/4/18  4:05 PM.  Always use your most recent med list.  
  
  
  
  
 atenolol 25 mg tablet Commonly known as:  TENORMIN Take 1 Tab by mouth daily. Indications: hypertension  
  
 cyclobenzaprine 10 mg tablet Commonly known as:  FLEXERIL  
TAKE 1 TABLET BY MOUTH TWICE DAILY  Indications: FIBROMYALGIA, Muscle Spasm  
  
 fluorometholone 0.1 % ophthalmic suspension Commonly known as: [address] [facility] SHAKE LQ AND INT 1 GTT IN OU BID LORazepam 1 mg tablet Commonly known as:  ATIVAN  
TAKE 1 TABLET BY MOUTH TWICE DAILY AS NEEDED FOR ANXIETY  
  
 metFORMIN  mg tablet Commonly known as:  GLUCOPHAGE XR Take 1 Tab by mouth daily. Indications: type 2 diabetes mellitus * methadone 10 mg tablet Commonly known as:  DOLOPHINE Take 5 tablets in the morning, 4 tablets at noon, and 4 tablets at bedtime for chronic, severe pain. Indications: CHRONIC PAIN, SEVERE PAIN  
  
 * methadone 10 mg tablet Commonly known as:  DOLOPHINE Take 5 tablets in the morning, 4 tablets at noon, and 4 tablets at bedtime for chronic, severe pain. Indications: Chronic Pain, Severe Pain * methadone 10 mg tablet Commonly known as:  DOLOPHINE Take 5 tablets in the morning, 4 tablets at noon, and 4 tablets at bedtime for chronic, severe pain. Indications: Chronic Pain, Severe Pain * methadone 10 mg tablet Commonly known as:  DOLOPHINE Take 4 tablets in the morning, 4 tablets at noon, and 4 tablets at bedtime for chronic, severe pain. Indications: Chronic Pain, Severe Pain * methadone 10 mg tablet Commonly known as:  DOLOPHINE Take 4 tablets in the morning, 4 tablets at noon, and 4 tablets at bedtime for chronic, severe pain. Indications: Chronic Pain, Severe Pain * methadone 10 mg tablet Commonly known as:  DOLOPHINE Take 4 tablets in the morning, 4 tablets at noon, and 4 tablets at bedtime for chronic, severe pain. Indications: Chronic Pain, Severe Pain * methadone 10 mg tablet Commonly known as:  DOLOPHINE Take 4 tablets in the morning, 4 tablets at noon, and 4 tablets at bedtime for chronic, severe pain. Indications: Chronic Pain, Severe Pain * methadone 10 mg tablet Commonly known as:  DOLOPHINE Take 4 tablets in the morning, 4 tablets at noon, and 4 tablets at bedtime for chronic, severe pain. Indications: Chronic Pain, Severe Pain * methadone 10 mg tablet Commonly known as:  DOLOPHINE Take 4 tablets in the morning, 4 tablets at noon, and 4 tablets at bedtime for chronic, severe pain. Indications: Chronic Pain, Severe Pain * methadone 10 mg tablet Commonly known as:  DOLOPHINE Take 4 tablets in the morning, 3 tablets at noon, and 3 tablets at bedtime for chronic, severe pain. Indications: Chronic Pain, Severe Pain * methadone 10 mg tablet Commonly known as:  DOLOPHINE Take 5 tablets in the morning, 4 tablets at noon, and 4 tablets at bedtime for chronic, severe pain. Indications: Chronic Pain, Severe Pain * methadone 10 mg tablet Commonly known as:  DOLOPHINE  
 Take 3 tablets in the morning, 3 tablets at noon, and 3 tablets at bedtime for chronic, severe pain. Indications: Chronic Pain, Severe Pain * methadone 10 mg tablet Commonly known as:  DOLOPHINE Take 3 tablets in the morning, 3 tablets at noon, and 3 tablets at bedtime for chronic, severe pain. Indications: Chronic Pain, Severe Pain * methadone 10 mg tablet Commonly known as:  DOLOPHINE Take 3 tablets in the morning, 3 tablets at noon, and 3 tablets at bedtime for chronic, severe pain. Indications: Chronic Pain, Severe Pain * methylphenidate HCl 10 mg tablet Commonly known as:  RITALIN Take 1 Tab by mouth three (3) times daily. * methylphenidate HCl 10 mg tablet Commonly known as:  RITALIN Take 1 Tab by mouth three (3) times daily. Max Daily Amount: 30 mg.  
  
 * methylphenidate HCl 10 mg tablet Commonly known as:  RITALIN Take 1 Tab by mouth three (3) times daily. Max Daily Amount: 30 mg.  
  
 * methylphenidate HCl 10 mg tablet Commonly known as:  RITALIN Take 1 Tab by mouth three (3) times daily. Max Daily Amount: 30 mg.  
  
 * methylphenidate HCl 10 mg tablet Commonly known as:  RITALIN Take 1 Tab by mouth three (3) times daily. naloxone 4 mg/actuation nasal spray Commonly known as:  NARCAN  
4 mg by Nasal route as needed for up to 2 doses. Indications: OPIOID TOXICITY NexIUM 40 mg capsule Generic drug:  esomeprazole TK 1 C PO QD AT 3PM  
  
 OTHER  
DDS Lumbar Traction Belt Use as directed  
  
 phenazopyridine 100 mg tablet Commonly known as:  PYRIDIUM Take 1 Tab by mouth three (3) times daily as needed for Pain. promethazine 25 mg tablet Commonly known as:  PHENERGAN Take 1 Tab by mouth every six (6) hours as needed for Nausea. Indications: nausea  
  
 simvastatin 40 mg tablet Commonly known as:  ZOCOR  
TAKE 1 TABLET BY MOUTH EVERY NIGHT AT BEDTIME  Indications: hypercholesterolemia  
  
 topiramate 50 mg tablet Commonly known as:  TOPAMAX Take 3 Tabs by mouth two (2) times a day. Indications: MIGRAINE PREVENTION, seizure XIIDRA 5 % Dpet Generic drug:  lifitegrast  
  
 * Notice: This list has 19 medication(s) that are the same as other medications prescribed for you. Read the directions carefully, and ask your doctor or other care provider to review them with you. Prescriptions Printed Refills  
 methylphenidate HCl (RITALIN) 10 mg tablet 0 Sig: Take 1 Tab by mouth three (3) times daily. Max Daily Amount: 30 mg.  
 Class: Print Route: Oral  
 LORazepam (ATIVAN) 1 mg tablet 5 Sig: TAKE 1 TABLET BY MOUTH TWICE DAILY AS NEEDED FOR ANXIETY Class: Print  
 methylphenidate HCl (RITALIN) 10 mg tablet 0 Sig: Take 1 Tab by mouth three (3) times daily. Max Daily Amount: 30 mg.  
 Class: Print Route: Oral  
 methylphenidate HCl (RITALIN) 10 mg tablet 0 Sig: Take 1 Tab by mouth three (3) times daily. Class: Print Route: Oral  
  
Prescriptions Sent to Pharmacy Refills  
 promethazine (PHENERGAN) 25 mg tablet 5 Sig: Take 1 Tab by mouth every six (6) hours as needed for Nausea. Indications: nausea Class: Normal  
 [facility] [address]  Ph #: [phone] Route: Oral  
 atenolol (TENORMIN) 25 mg tablet 5 Sig: Take 1 Tab by mouth daily. Indications: hypertension Class: Normal  
 [facility] [address] Ph #: [phone] Route: Oral  
 topiramate (TOPAMAX) 50 mg tablet 4 Sig: Take 3 Tabs by mouth two (2) times a day. Indications: MIGRAINE PREVENTION, seizure Class: Normal  
 Pharmacy: [facility] [address] Ph #: [phone]  Route: Oral  
 metFORMIN ER (GLUCOPHAGE XR) 750 mg tablet 4  
 Sig: Take 1 Tab by mouth daily. Indications: type 2 diabetes mellitus Class: Normal  
 Pharmacy: [facility] [address], [address] Ph #: [phone] Route: Oral  
 simvastatin (ZOCOR) 40 mg tablet 5 Sig: TAKE 1 TABLET BY MOUTH EVERY NIGHT AT BEDTIME  Indications: hypercholesterolemia Class: Normal  
 Pharmacy: [facility] [address], [address] Ph #: [phone]  
 cyclobenzaprine (FLEXERIL) 10 mg tablet 5 Sig: TAKE 1 TABLET BY MOUTH TWICE DAILY  Indications: FIBROMYALGIA, Muscle Spasm Class: Normal  
 Pharmacy: [facility] [address], [address] Ph #: [phone] Follow-up Instructions Return in about 3 months (around 7/4/2018) for HTN, DM, cholesterol, Med refills. Introducing [facility]! Bijal Huston introduces Md7 patient portal. Now you can access parts of your medical record, email your doctor's office, and request medication refills online. 1. In your internet browser, go to [email or web address] 2. Click on the First Time User? Click Here link in the Sign In box. You will see the New Member Sign Up page. 3. Enter your Md7 Access Code exactly as it appears below. You will not need to use this code after youve completed the sign-up process. If you do not sign up before the expiration date, you must request a new code. · Md7 Access Code: [email or web address] Expires: 4/29/2018 11:23 AM 
 
4. Enter the last four digits of your Social Security Number (xxxx) and Date of Birth (mm/dd/yyyy) as indicated and click Submit. You will be taken to the next sign-up page. 5. Create a Md7 ID. This will be your Md7 login ID and cannot be changed, so think of one that is secure and easy to remember. 6. Create a MindEdge password. You can change your password at any time. 7. Enter your Password Reset Question and Answer. This can be used at a later time if you forget your password. 8. Enter your e-mail address. You will receive e-mail notification when new information is available in [address] 9. Click Sign Up. You can now view and download portions of your medical record. 10. Click the Download Summary menu link to download a portable copy of your medical information. If you have questions, please visit the Frequently Asked Questions section of the MindEdge website. Remember, MindEdge is NOT to be used for urgent needs. For medical emergencies, dial 911. Now available from your iPhone and Android! Please provide this summary of care documentation to your next provider. Your primary care clinician is listed as [facility]. If you have any questions after today's visit, please call [phone].

## 2018-04-04 NOTE — PROGRESS NOTES
HISTORY OF PRESENT ILLNESS  Yulissa Parham is a 59 y.o. female. Visit Vitals    /89    Pulse (!) 114    Temp 99.5 °F (37.5 °C) (Oral)    Resp 22    Ht 5' 1\" (1.549 m)    Wt 160 lb 12.8 oz (72.9 kg)    SpO2 97%    BMI 30.38 kg/m2       Hypertension    The history is provided by the patient. This is a chronic problem. The current episode started more than 1 week ago. Associated symptoms include malaise/fatigue. There are no associated agents to hypertension. Risk factors include obesity, a sedentary lifestyle and diabetes mellitus. Diabetes   The history is provided by the patient. This is a chronic problem. The current episode started more than 1 week ago. The problem occurs daily. The problem has not changed since onset. Exacerbated by: diet. The symptoms are relieved by medications (diet). Treatments tried: see med list.       Review of Systems   Constitutional: Positive for malaise/fatigue. Very achy today   Musculoskeletal: Positive for back pain, joint pain and myalgias. Physical Exam   Constitutional: She is oriented to person, place, and time. She appears well-developed and well-nourished. She appears distressed. Cardiovascular: Normal rate and regular rhythm. Pulmonary/Chest: Effort normal and breath sounds normal.   Musculoskeletal: She exhibits no edema. Neurological: She is alert and oriented to person, place, and time. Skin: Skin is warm and dry. She is not diaphoretic. Psychiatric: She has a normal mood and affect. Nursing note and vitals reviewed. ASSESSMENT and PLAN    ICD-10-CM ICD-9-CM    1. Type 2 diabetes mellitus without complication, without long-term current use of insulin (HCC) E11.9 250.00    2. Essential hypertension I10 401.9    3.  Medication refill Z76.0 V68.1 promethazine (PHENERGAN) 25 mg tablet      atenolol (TENORMIN) 25 mg tablet      metFORMIN ER (GLUCOPHAGE XR) 750 mg tablet      methylphenidate HCl (RITALIN) 10 mg tablet simvastatin (ZOCOR) 40 mg tablet      cyclobenzaprine (FLEXERIL) 10 mg tablet      LORazepam (ATIVAN) 1 mg tablet      methylphenidate HCl (RITALIN) 10 mg tablet      methylphenidate HCl (RITALIN) 10 mg tablet       BP up today but she is not feeling well and is in pain    Refills as noted   reviewed and appropriate activity noted. No adverse activity noted.   She sees pain management for her methadone    F/u 3 months for refills    Lab was never done in December--will collect today

## 2018-04-23 DIAGNOSIS — Z76.0 MEDICATION REFILL: ICD-10-CM

## 2018-04-23 RX ORDER — CYCLOBENZAPRINE HCL 10 MG
TABLET ORAL
Qty: 180 TAB | Refills: 0 | Status: SHIPPED | OUTPATIENT
Start: 2018-04-23 | End: 2018-06-25 | Stop reason: DRUGHIGH

## 2018-05-02 ENCOUNTER — DOCUMENTATION ONLY (OUTPATIENT)
Dept: PAIN MANAGEMENT | Age: 58
End: 2018-05-02

## 2018-05-02 NOTE — PROGRESS NOTES
Patient came into the clinic on 05/01 for her appt with YANIV. It was told to the nursing staff that the patient was 30 minutes late. The  was advised to inform the patient that she has to reschedule her appointment. Tamela Chawla from the  informed me that the patient is trying fall out. I was not aware of what he was referring to. I went to the front end and we assisted the patient into the wheelchair. The charge nurse Yeni May came to the front and took vital signs. I called 911 and gave them the information for them to come and get the patient due to the change in conscience level. The EMT arrived. The patient declined to go with them. The patient drove herself away from the clinic.

## 2018-05-04 ENCOUNTER — OFFICE VISIT (OUTPATIENT)
Dept: PAIN MANAGEMENT | Age: 58
End: 2018-05-04

## 2018-05-04 VITALS
DIASTOLIC BLOOD PRESSURE: 78 MMHG | TEMPERATURE: 97.9 F | RESPIRATION RATE: 14 BRPM | WEIGHT: 160 LBS | SYSTOLIC BLOOD PRESSURE: 126 MMHG | BODY MASS INDEX: 30.21 KG/M2 | HEART RATE: 99 BPM | HEIGHT: 61 IN

## 2018-05-04 DIAGNOSIS — R51.9 CHRONIC INTRACTABLE HEADACHE, UNSPECIFIED HEADACHE TYPE: ICD-10-CM

## 2018-05-04 DIAGNOSIS — Z79.899 ENCOUNTER FOR LONG-TERM (CURRENT) USE OF HIGH-RISK MEDICATION: Primary | ICD-10-CM

## 2018-05-04 DIAGNOSIS — M70.62 TROCHANTERIC BURSITIS OF BOTH HIPS: ICD-10-CM

## 2018-05-04 DIAGNOSIS — Z76.0 MEDICATION REFILL: ICD-10-CM

## 2018-05-04 DIAGNOSIS — R29.3 ABNORMAL POSTURE: ICD-10-CM

## 2018-05-04 DIAGNOSIS — M70.61 TROCHANTERIC BURSITIS OF BOTH HIPS: ICD-10-CM

## 2018-05-04 DIAGNOSIS — G89.29 CHRONIC INTRACTABLE HEADACHE, UNSPECIFIED HEADACHE TYPE: ICD-10-CM

## 2018-05-04 DIAGNOSIS — M54.81 BILATERAL OCCIPITAL NEURALGIA: ICD-10-CM

## 2018-05-04 DIAGNOSIS — G89.4 CHRONIC PAIN SYNDROME: ICD-10-CM

## 2018-05-04 DIAGNOSIS — G43.919 INTRACTABLE MIGRAINE WITHOUT STATUS MIGRAINOSUS, UNSPECIFIED MIGRAINE TYPE: ICD-10-CM

## 2018-05-04 DIAGNOSIS — F11.20 OPIOID USE DISORDER, MODERATE, DEPENDENCE (HCC): ICD-10-CM

## 2018-05-04 RX ORDER — DOCUSATE SODIUM 100 MG/1
100 CAPSULE, LIQUID FILLED ORAL
Qty: 60 CAP | Refills: 2 | Status: SHIPPED | OUTPATIENT
Start: 2018-05-04 | End: 2018-08-02 | Stop reason: SDUPTHER

## 2018-05-04 RX ORDER — AMOXICILLIN 250 MG
CAPSULE ORAL
Qty: 30 TAB | Refills: 0 | Status: SHIPPED | OUTPATIENT
Start: 2018-05-04 | End: 2021-04-26

## 2018-05-04 RX ORDER — METFORMIN HYDROCHLORIDE 750 MG/1
750 TABLET, EXTENDED RELEASE ORAL DAILY
Qty: 90 TAB | Refills: 4 | Status: SHIPPED | OUTPATIENT
Start: 2018-05-04 | End: 2019-06-13 | Stop reason: SDUPTHER

## 2018-05-04 RX ORDER — METHADONE HYDROCHLORIDE 10 MG/1
10 TABLET ORAL
Qty: 240 TAB | Refills: 0 | Status: SHIPPED | OUTPATIENT
Start: 2018-05-05 | End: 2018-05-31 | Stop reason: SDUPTHER

## 2018-05-04 NOTE — MR AVS SNAPSHOT
[address] [facility] [address] 
[phone] Patient: Eveline Martinez MRN: [id number] Visit Information Date & Time Provider Department Dept. Phone Encounter #  
 5/4/2018  1:00 PM Maribeth Arnold [address] [facility] [phone] [id number] Your Appointments 7/9/2018  3:30 PM  
Office Visit with MD [address] [place] [address]) Appt Note: 3 month f/u HTN and DM and chol/med refill  
 [address]  
  
   
 [address] [facility] Upcoming Health Maintenance Date Due COLONOSCOPY 1/20/2017 EYE EXAM RETINAL OR DILATED Q1 7/3/2018* Pneumococcal 19-64 Medium Risk (1 of 1 - PPSV23) 10/1/2018* DTaP/Tdap/Td series (1 - Tdap) 12/26/2018* ZOSTER VACCINE AGE 60> 4/4/2019* FOOT EXAM Q1 7/6/2018 MICROALBUMIN Q1 7/6/2018 Influenza Age 5 to Adult 8/1/2018 HEMOGLOBIN A1C Q6M 10/4/2018 MEDICARE YEARLY EXAM 12/27/2018 BREAST CANCER SCRN MAMMOGRAM 1/17/2019 LIPID PANEL Q1 4/4/2019 PAP AKA CERVICAL CYTOLOGY 12/26/2020 *Topic was postponed. The date shown is not the original due date. Allergies as of 5/4/2018  Review Complete On: 5/4/2018 By: Maribeth Arnold DO Severity Noted Reaction Type Reactions Zanaflex [Tizanidine]  04/18/2016   Side Effect Other (comments) Nausea and worsening headache Current Immunizations  Reviewed on 4/3/2018 Name Date Influenza Vaccine 9/30/2015  2:50 PM  
 Influenza Vaccine (Quad) PF 12/26/2017, 12/15/2016 Pneumococcal Conjugate (PCV-13) 12/15/2016 Not reviewed this visit You Were Diagnosed With   
  
 Codes Comments Encounter for long-term (current) use of high-risk medication    -  Primary ICD-10-CM: K35.098 ICD-9-CM: V58.69 Medication refill     ICD-10-CM: Z76.0 ICD-9-CM: V68.1 Intractable migraine without status migrainosus, unspecified migraine type     ICD-10-CM: G43.919 ICD-9-CM: 346.91 Bilateral occipital neuralgia     ICD-10-CM: M54.81 ICD-9-CM: 723.8 Chronic pain syndrome     ICD-10-CM: G89.4 ICD-9-CM: 338.4 Opioid use disorder, moderate, dependence (HCC)     ICD-10-CM: J23.29 ICD-9-CM: 304.00 Trochanteric bursitis of both hips     ICD-10-CM: M70.61, M70.62 ICD-9-CM: 726.5 Abnormal posture     ICD-10-CM: R29.3 ICD-9-CM: 781.92 Chronic intractable headache, unspecified headache type     ICD-10-CM: R51 ICD-9-CM: 291. 0 Vitals BP Pulse Temp Resp Height(growth percentile) Weight(growth percentile) 126/78 (BP 1 Location: Right arm, BP Patient Position: Sitting) 99 97.9 °F (36.6 °C) (Oral) 14 5' 1\" (1.549 m) 160 lb (72.6 kg) BMI OB Status Smoking Status 30.23 kg/m2 Hysterectomy Never Smoker Vitals History BMI and BSA Data Body Mass Index Body Surface Area  
 30.23 kg/m 2 1.77 m 2 Preferred Pharmacy Pharmacy Name Phone [phone] [facility], [address] [place] [address] Dr [phone] Your Updated Medication List  
  
   
This list is accurate as of 5/4/18  3:27 PM.  Always use your most recent med list.  
  
  
  
  
 atenolol 25 mg tablet Commonly known as:  TENORMIN Take 1 Tab by mouth daily. Indications: hypertension  
  
 cyclobenzaprine 10 mg tablet Commonly known as:  FLEXERIL  
TAKE 1 TABLET BY MOUTH TWICE DAILY docusate sodium 100 mg capsule Commonly known as:  Grey Guppy Take 1 Cap by mouth two (2) times daily as needed for Constipation for up to 90 days. fluorometholone 0.1 % ophthalmic suspension Commonly known as: [address] [facility] LQ AND INT 1 GTT IN OU BID LORazepam 1 mg tablet Commonly known as:  ATIVAN  
TAKE 1 TABLET BY MOUTH TWICE DAILY AS NEEDED FOR ANXIETY metFORMIN  mg tablet Commonly known as:  GLUCOPHAGE XR Take 1 Tab by mouth daily. Indications: type 2 diabetes mellitus  
  
 methadone 10 mg tablet Commonly known as:  DOLOPHINE Take 1 Tab by mouth every eight (8) hours as needed for Pain (take 30mg in am, 20mg at midday, and 30mg in the evenings) for up to 30 days. Max Daily Amount: 30 mg. Indications: Chronic Pain, Severe Pain Start taking on:  5/5/2018  
  
 methylphenidate HCl 10 mg tablet Commonly known as:  RITALIN Take 1 Tab by mouth three (3) times daily. naloxone 4 mg/actuation nasal spray Commonly known as:  NARCAN  
4 mg by Nasal route as needed for up to 2 doses. Indications: OPIOID TOXICITY NexIUM 40 mg capsule Generic drug:  esomeprazole TK 1 C PO QD AT 3PM  
  
 OTHER  
DDS Lumbar Traction Belt Use as directed  
  
 promethazine 25 mg tablet Commonly known as:  PHENERGAN Take 1 Tab by mouth every six (6) hours as needed for Nausea. Indications: nausea  
  
 senna-docusate 8.6-50 mg per tablet Commonly known as:  PERICOLACE  
1-2 tabs daily as needed for constipation  
  
 simvastatin 40 mg tablet Commonly known as:  ZOCOR  
TAKE 1 TABLET BY MOUTH EVERY NIGHT AT BEDTIME  Indications: hypercholesterolemia  
  
 topiramate 50 mg tablet Commonly known as:  TOPAMAX Take 3 Tabs by mouth two (2) times a day. Indications: MIGRAINE PREVENTION, seizure XIIDRA 5 % Dpet Generic drug:  lifitegrast  
  
  
  
  
Prescriptions Printed Refills  
 methadone (DOLOPHINE) 10 mg tablet 0 Starting on: 5/5/2018 Sig: Take 1 Tab by mouth every eight (8) hours as needed for Pain (take 30mg in am, 20mg at midday, and 30mg in the evenings) for up to 30 days. Max Daily Amount: 30 mg. Indications: Chronic Pain, Severe Pain Class: Print Route: Oral  
  
Prescriptions Sent to Pharmacy  Refills  
 metFORMIN ER (GLUCOPHAGE XR) 750 mg tablet 4  
 Sig: Take 1 Tab by mouth daily. Indications: type 2 diabetes mellitus Class: Normal  
 Pharmacy: [facility] [address]  Ph #: [phone] Route: Oral  
 docusate sodium (COLACE) 100 mg capsule 2 Sig: Take 1 Cap by mouth two (2) times daily as needed for Constipation for up to 90 days. Class: Normal  
 [facility]: [facility] [place] [facility] [address]  Ph #: [phone] Route: Oral  
 senna-docusate (PERICOLACE) 8.6-50 mg per tablet 0 Si-2 tabs daily as needed for constipation Class: Normal  
 Pharmacy: [facility] [place] [facility] [address]  Ph #: [phone] We Performed the Following REFERRAL TO BEHAVIORAL HEALTH [REF8 Custom] Comments:  
 26-year-old female who is been treated for complex widespread chronic pain with continuous opioids for approximately 21 years. She screen positive for moderate opioid use disorder. Please evaluate and treat for opioid use disorder. We will continue to treat her for her chronic pain issue. Please use [facility] addiction treatment center [address] [facility] [address],  [place], [address] [place] [address] Road Phone [phone] REFERRAL TO NEUROLOGY [[id number] Custom] Comments:  
 26-year-old female with chronic daily headache, previously treated at a specialty headache clinic in [place] would like to reestablish treatment with neurology. Please evaluate and treat for headache. Please use neurological [facility] [address] [place], [facility] [address] Phone [phone] To-Do List   
 2018 ECG:  EKG, 12 LEAD, INITIAL   
  
 2018 Imaging:  XR HIPS BI W AP PELV   
  
 2018 Imaging:  XR SPINE LUMB COMP W BEND Referral Information Referral ID Referred By Referred To  
  
 [id number] Lyubov Albert FLAVIO Not Available Visits Status Start Date End Date 1 New Request 5/4/18 5/4/19 If your referral has a status of pending review or denied, additional information will be sent to support the outcome of this decision. Referral ID Referred By Referred To  
 [id number] Lyubov MUNIZ Not Available Visits Status Start Date End Date 1 New Request 5/4/18 5/4/19 If your referral has a status of pending review or denied, additional information will be sent to support the outcome of this decision. Patient Instructions Learning About Sleeping Well What does sleeping well mean? Sleeping well means getting enough sleep. How much sleep is enough varies among people. The number of hours you sleep is not as important as how you feel when you wake up. If you do not feel refreshed, you probably need more sleep. Another sign of not getting enough sleep is feeling tired during the day. The average total nightly sleep time is 7½ to 8 hours. Healthy adults may need a little more or a little less than this. Why is getting enough sleep important? Getting enough quality sleep is a basic part of good health. When your sleep suffers, your mood and your thoughts can suffer too. You may find yourself feeling more grumpy or stressed. Not getting enough sleep also can lead to serious problems, including injury, accidents, anxiety, and depression. What might cause poor sleeping? Many things can cause sleep problems, including: · Stress. Stress can be caused by fear about a single event, such as giving a speech. Or you may have ongoing stress, such as worry about work or school. · Depression, anxiety, and other mental or emotional conditions. · Changes in your sleep habits or surroundings.  This includes changes that happen where you sleep, such as noise, light, or sleeping in a different bed. It also includes changes in your sleep pattern, such as having jet lag or working a late shift. · Health problems, such as pain, breathing problems, and restless legs syndrome. · Lack of regular exercise. How can you help yourself? Here are some tips that may help you sleep more soundly and wake up feeling more refreshed. Your sleeping area · Use your bedroom only for sleeping and sex. A bit of light reading may help you fall asleep. But if it doesn't, do your reading elsewhere in the house. Don't watch TV in bed. · Be sure your bed is big enough to stretch out comfortably, especially if you have a sleep partner. · Keep your bedroom quiet, dark, and cool. Use curtains, blinds, or a sleep mask to block out light. To block out noise, use earplugs, soothing music, or a \"white noise\" machine. Your evening and bedtime routine · Create a relaxing bedtime routine. You might want to take a warm shower or bath, listen to soothing music, or drink a cup of noncaffeinated tea. · Go to bed at the same time every night. And get up at the same time every morning, even if you feel tired. What to avoid · Limit caffeine (coffee, tea, caffeinated sodas) during the day, and don't have any for at least 4 to 6 hours before bedtime. · Don't drink alcohol before bedtime. Alcohol can cause you to wake up more often during the night. · Don't smoke or use tobacco, especially in the evening. Nicotine can keep you awake. · Don't take naps during the day, especially close to bedtime. · Don't lie in bed awake for too long. If you can't fall asleep, or if you wake up in the middle of the night and can't get back to sleep within 15 minutes or so, get out of bed and go to another room until you feel sleepy. · Don't take medicine right before bed that may keep you awake or make you feel hyper or energized. Your doctor can tell you if your medicine may do this and if you can take it earlier in the day. If you can't sleep · Imagine yourself in a peaceful, pleasant scene. Focus on the details and feelings of being in a place that is relaxing. · Get up and do a quiet or boring activity until you feel sleepy. · Don't drink any liquids after 6 p.m. if you wake up often because you have to go to the bathroom. Where can you learn more? Go to http://mary-consuelo.info/. Enter E753 in the search box to learn more about \"Learning About Sleeping Well. \" Current as of: May 12, 2017 Content Version: 11.4 © 3918-1636 [employer]. Care instructions adapted under license by [employer] (which disclaims liability or warranty for this information). If you have questions about a medical condition or this instruction, always ask your healthcare professional. [address] any warranty or liability for your use of this information. Introducing [facility]! Brit Rivera introduces Karaz patient portal. Now you can access parts of your medical record, email your doctor's office, and request medication refills online. 1. In your internet browser, go to [email or web address] 2. Click on the First Time User? Click Here link in the Sign In box. You will see the New Member Sign Up page. 3. Enter your Karaz Access Code exactly as it appears below. You will not need to use this code after youve completed the sign-up process. If you do not sign up before the expiration date, you must request a new code. · Karaz Access Code: [email or web address] Expires: 8/2/2018  1:00 PM 
 
4. Enter the last four digits of your Social Security Number (xxxx) and Date of Birth (mm/dd/yyyy) as indicated and click Submit. You will be taken to the next sign-up page. 5. Create a Takeacodert ID. This will be your Karaz login ID and cannot be changed, so think of one that is secure and easy to remember. 6. Create a Takeacodert password. You can change your password at any time. 7. Enter your Password Reset Question and Answer. This can be used at a later time if you forget your password. 8. Enter your e-mail address. You will receive e-mail notification when new information is available in [address] 9. Click Sign Up. You can now view and download portions of your medical record. 10. Click the Download Summary menu link to download a portable copy of your medical information. If you have questions, please visit the Frequently Asked Questions section of the [employer] website. Remember, [employer] is NOT to be used for urgent needs. For medical emergencies, dial 911. Now available from your iPhone and Android! Please provide this summary of care documentation to your next provider. Your primary care clinician is listed as [facility]. If you have any questions after today's visit, please call [phone].

## 2018-05-04 NOTE — PATIENT INSTRUCTIONS

## 2018-05-04 NOTE — PROGRESS NOTES
Nursing Notes    Patient presents to the office today in follow-up. Patient rates her pain at 6/10 on the numerical pain scale. Reviewed medications with counts as follows:    Rx Date filled Qty Dispensed Pill Count Last Dose Short   Methadone 10 mg 04/04/18 270 15 This a.m. no                                  POC UDS was not performed in office today    Any new labs or imaging since last appointment? YES. Pt states that she had some labs done with her pcp    Have you been to an emergency room (ER) or urgent care clinic since your last visit? NO            Have you been hospitalized since your last visit? NO     If yes, where, when, and reason for visit? Have you seen or consulted any other health care providers outside of the [facility]  since your last visit? YES     If yes, where, when, and reason for visit? pcp    Ms. Yung Cervantes has a reminder for a \"due or due soon\" health maintenance. I have asked that she contact her primary care provider for follow-up on this health maintenance.

## 2018-05-04 NOTE — PROGRESS NOTES
Referral date November 2013, source multidisciplinary outpatient pain control and question chronic widespread pain and headaches. HPI:  Ricco Jiménez is a 59 y.o. female here for f/u visit for ongoing evaluation of chronic widespread pain worse across the lumbosacral spine and bilateral hips. Pt was last seen here on January 29, 2018. Pt denies interval changes on  the character or distribution of pain. Pain is located across the lumbosacral belt line and into the right gluteal region, bilateral knees, cervical spine. The pain is described as constant aching with intermittent sharp pain in the lumbar spine hips and knees ranging from 4-8/10 and burning pain in the cervical spine. The pain is worsened by almost any kind of activity, sitting still. Symptoms are relieved by relative rest and mildly with use of methadone, heat, TENS unit. She has no perceived benefit from physical therapy which was last done greater than 5 years ago. She also reports chronic severe daily headache without aura but with nausea and photophobia. She has never had any cervical back or joint surgeries. She has never had injections into her back or occipital region other than a couple of trials of lumbosacral trigger point injections many years ago. She has been on chronic opioid therapy continuously without breaks for 21 years. She also has a history of depression, anxiety, OCD, PTSD. She takes lorazepam 1 mg 3 times a day which she says helps more than anything else.     Social History     Social History    Marital status:      Spouse name: N/A    Number of children: N/A    Years of education: N/A     Occupational History    retired, , ,       Social History Main Topics    Smoking status: Never Smoker    Smokeless tobacco: Never Used    Alcohol use No    Drug use: No    Sexual activity: Not Currently     Other Topics Concern    Not on file     Social History Narrative Family History   Problem Relation Age of Onset    Alcohol abuse Father     Diabetes Sister     Migraines Daughter     Diabetes Maternal Aunt     Cancer Maternal Aunt     Diabetes Maternal Uncle     Diabetes Paternal Aunt     Diabetes Paternal Uncle     Heart Attack Maternal Grandmother      Allergies   Allergen Reactions    Zanaflex [Tizanidine] Other (comments)     Nausea and worsening headache     Past Medical History:   Diagnosis Date    Anxiety     Cataract 6/19/15    bilat    Chronic sialoadenitis     Degenerative disc disease     Depression     Diabetes ([facility] [address]     Dry eye     Dyslipidemia     Fatigue     uses ritalin for this    Fibromyalgia     GERD (gastroesophageal reflux disease)     Headache(784.0)     daily, all her life    Hypertension     OCD (obsessive compulsive disorder)     PTSD (post-traumatic stress disorder)     Seizure disorder ([facility] [address]     Thyroid dysfunction     TMJ (temporomandibular joint disorder)     right    Viral encephalitis     unknown type from mosquito     Past Surgical History:   Procedure Laterality Date    HX  SECTION      X 2    HX COLONOSCOPY      ? f/u    HX PARTIAL HYSTERECTOMY       Current Outpatient Prescriptions on File Prior to Visit   Medication Sig    cyclobenzaprine (FLEXERIL) 10 mg tablet TAKE 1 TABLET BY MOUTH TWICE DAILY    promethazine (PHENERGAN) 25 mg tablet Take 1 Tab by mouth every six (6) hours as needed for Nausea. Indications: nausea    atenolol (TENORMIN) 25 mg tablet Take 1 Tab by mouth daily. Indications: hypertension    topiramate (TOPAMAX) 50 mg tablet Take 3 Tabs by mouth two (2) times a day.  Indications: MIGRAINE PREVENTION, seizure    simvastatin (ZOCOR) 40 mg tablet TAKE 1 TABLET BY MOUTH EVERY NIGHT AT BEDTIME  Indications: hypercholesterolemia    LORazepam (ATIVAN) 1 mg tablet TAKE 1 TABLET BY MOUTH TWICE DAILY AS NEEDED FOR ANXIETY    methylphenidate (RITALIN) 10 mg tablet Take 1 Tab by mouth three (3) times daily.  NEXIUM 40 mg capsule TK 1 C PO QD AT 3PM    OTHER DDS Lumbar Traction Belt  Use as directed    fluorometholone (FML) 0.1 % ophthalmic suspension SHAKE LQ AND INT 1 GTT IN OU BID    XIIDRA 5 % dpet     naloxone 4 mg/actuation spry 4 mg by Nasal route as needed for up to 2 doses. Indications: OPIOID TOXICITY     No current facility-administered medications on file prior to visit. ROS:  She denies fever chills nausea vomiting, chest pain, trouble breathing, diarrhea, constipation, bowel incontinence, bladder incontinence, suicidal ideation, homicidal ideation, visual changes, hearing changes, unexplained weight gain or weight loss, changes in appetite, bloody or tarry stool, focal weakness. Opioid specific risk:  Concurrent benzodiazepine use, depression, family history of suicide, family history of substance use disorder, PTSD, depression, did not  the Narcan nasal spray which was previously ordered, diabetes, anxiety. Seizure disorder    Vitals:    05/04/18 1255   BP: 126/78   Pulse: 99   Resp: 14   Temp: 97.9 °F (36.6 °C)   TempSrc: Oral   Weight: 72.6 kg (160 lb)   Height: 5' 1\" (1.549 m)   PainSc:   6   PainLoc: Head             EKG: Last done more than a year ago    PE:  AFVSS, no acute distress, normal body habitus. A&OXs 3.  normocephalic, atraumatic. Conjugate gaze, clear sclerae. Somewhat flattened affect, intermittent generalized tremor. Cranial nerves II through XII are intact  No hand atrophy noted bilaterally    LE:   Strength for right lower extremity is 5/5 for hip flexion, knee extension, dorsiflexion, extensor hallucis longus, plantar flexion. Strength for left lower extremity is 5/5 for hip flexion, knee extension, dorsiflexion, extensor hallucis longus, plantar flexion. Neuro:   Muscle stretch reflexes for right upper extremity are 1 + for C5, C6, C7.   Muscle stretch reflexes for left upper extremity are 1 + for C5, C6, C7.  Muscle Stretch reflexes for right lower extremity are 1 + for L4, absent S1. Muscle Stretch reflexes for left  lower extremity are 1 + for L4, absent S1. Negative ankle clonus on the right and the left. Negative Hoffmans on the right and the left. MSK:   Negative supine and seated straight leg raise bilaterally. Patient highly reactive to FABERs on the right and the left but not in the typical sacroiliac pain pattern. Negative Stinchfield's on the right and a left. Negative FADIRS on the right and the left. Gait is within functional limits with decreased evelyne  Balance is within functional limits. Sensation to light touch is intact for  right L2-S2 and left L2-S2. Lumbar active range of motion for flexion was reduced by 25% with reproduction of primary pain. Active range of motion for lumbar extension was reduced by 25% but without change in primary pain complaints. Posture with bilateral rounded shoulders and forward head posture with persistent forward trunk lean and slight bilateral knee flexion. Right lower extremity is approximately 7 mm shorter than the left. Palpation over the right and the left greater occipital nerve in the suboccipital triangle reproduced aspects of her primary headache complaint. Patient is highly reactive to physical exam With tenderness to palpation throughout    Calculated MEQ -270 and reduced to 240  Naloxone rescue -prescription is available but patient has not picked up the medication  Prophylactic bowel program -no    Last UDS January 29, 2018, consistent  , date checked today, findingsconsistent    Primary Care Physician  Panda Elizondo [phone] [facility]  [phone]      Global impression of change 5 and 4    PRINCE -32%    COMM-6    PHQ -- .   PHQ over the last two weeks 5/4/2018   Little interest or pleasure in doing things Not at all   Feeling down, depressed or hopeless Not at all Total Score PHQ 2 0           DSM V-OUD Screen--positive for moderate opioid use disorder    Assessment/Plan:     ICD-10-CM ICD-9-CM    1. Encounter for long-term (current) use of high-risk medication Z79.899 V58.69 methadone (DOLOPHINE) 10 mg tablet      EKG, 12 LEAD, INITIAL      docusate sodium (COLACE) 100 mg capsule      senna-docusate (PERICOLACE) 8.6-50 mg per tablet   2. Medication refill Z76.0 V68.1 metFORMIN ER (GLUCOPHAGE XR) 750 mg tablet   3. Intractable migraine without status migrainosus, unspecified migraine type G43.919 346.91    4. Bilateral occipital neuralgia M54.81 723.8    5. Chronic pain syndrome G89.4 338.4 docusate sodium (COLACE) 100 mg capsule      senna-docusate (PERICOLACE) 8.6-50 mg per tablet      XR HIPS BI W AP PELV      XR SPINE LUMB COMP W BEND   6. Opioid use disorder, moderate, dependence ([address] F11.20 304.00 REFERRAL TO BEHAVIORAL HEALTH   7. Trochanteric bursitis of both hips M70.61 726.5 XR HIPS BI W AP PELV    M70.62     8. Abnormal posture R29.3 781.92    9. Chronic intractable headache, unspecified headache type R51 784.0 REFERRAL TO NEUROLOGY   9.  Limb length difference    Patient has multiple risk factors regarding long-term opioid treatment for chronic pain and has been maintained on opioid therapy continuously for 21 years. She had a reduction in her overall dosages at her previous visit 3 months earlier. She has tolerated this despite mild increased difficulty. She is agreeable to a planned 10% per month reduction in morphine milliequivalents until she is off of opioids. She screen positive for moderate opioid use disorder and she was open to referral for treatment of her opioid use disorder at a specialty clinic, \"the right path \". Senna and Colace was provided for as needed constipation. Patient was encouraged to  her Narcan. Today she received methadone 10 mg tablets to take 30 mg in the a.m. 20 mg at midday and 30 mg at night as needed for pain. She was educated on signs and symptoms of withdrawal and instructed to call the clinic if she has difficulty maintaining the decreased dose of opioids. Routine EKG was requested today    She reports that she was previously treated at a headache specialty clinic and diagnosed with migraine headache, chronic daily tension headache, and rebound headache. Physical exam today revealed that there is some contribution from bilateral greater occipital neuralgia to her headache syndromes for which we will consider a diagnostic therapeutic injection. We will also refer her to neurology to resume treatment of her headache syndromes. X-rays were requested for the lumbar spine and bilateral hip region. Patient has likely contribution of her overall pain from opioid-induced hyperalgesia, bilateral greater trochanteric bursitis exacerbated by limb length differences with a short right leg, bilateral sacroiliitis, and multiple active trigger points in bilateral lumbosacral region. She will return to the clinic in approximately 6 weeks for ongoing assessment and update on the requested studies and referrals. She would greatly benefit from regular pain psychology intervention for chronic pain, depression, PTSD so we will consider this at each follow-up visit as well as ongoing consideration for physical therapy to optimize her posture, musculoskeletal balance, consider right heel lift, for chronic pain to help improve Kinesio phobia and fear avoidance behaviors. GOALS:  To establish complementary and integrative plan of care to address chronic pain issues while minimizing pharmaceuticals to maximize patient's function improve quality of life.    enhance safety with pharmaceuticals     Education:  Patient again educated on the importance of strict compliance with the opioid care agreement while on opioid therapy. Patient also again educated that they should avoid driving while on chronic opioid therapy.   Also advised to avoid alcohol and to avoid benzodiazepines while on opioid therapy. Patient Homework:  Continue to independently investigate yoga for persons with chronic pain. Resume as needed use of her TENS unit. F/u:. Follow-up Disposition:  Return in about 6 days (around 5/10/2018), or if symptoms worsen or fail to improve, for Follow-up.

## 2018-05-07 ENCOUNTER — TELEPHONE (OUTPATIENT)
Dept: PAIN MANAGEMENT | Age: 58
End: 2018-05-07

## 2018-05-07 NOTE — TELEPHONE ENCOUNTER
I called [facility] about the referral that was ordered for the pt. They do not take the pt's insurance. I asked about the cost for cash pay patients. The initial visit will cost her 250.00 and then 150.00 for every visit there after. The pt will need to follow up every week for the first 4 weeks. This information was brought to the provider for review. The pt will need to contact her insurance company to see who they will cover. She will be mailed the referral to take with her once she is able to find a provider. I called and spoke to the pt. She was made aware of the above information and is ok with contacting her insurance. The pt's address was confirmed with the pt. The referral will be mailed out tomorrow.

## 2018-05-20 DIAGNOSIS — Z76.0 MEDICATION REFILL: ICD-10-CM

## 2018-05-21 RX ORDER — SIMVASTATIN 40 MG/1
TABLET, FILM COATED ORAL
Qty: 90 TAB | Refills: 0 | Status: SHIPPED | OUTPATIENT
Start: 2018-05-21 | End: 2018-06-25 | Stop reason: SDUPTHER

## 2018-05-21 RX ORDER — METFORMIN HYDROCHLORIDE 750 MG/1
TABLET, EXTENDED RELEASE ORAL
Qty: 90 TAB | Refills: 0 | Status: SHIPPED | OUTPATIENT
Start: 2018-05-21 | End: 2018-05-31 | Stop reason: SDUPTHER

## 2018-05-21 RX ORDER — SIMVASTATIN 40 MG/1
TABLET, FILM COATED ORAL
Qty: 90 TAB | Refills: 0 | Status: SHIPPED | OUTPATIENT
Start: 2018-05-21 | End: 2018-05-31 | Stop reason: SDUPTHER

## 2018-05-31 ENCOUNTER — OFFICE VISIT (OUTPATIENT)
Dept: PAIN MANAGEMENT | Age: 58
End: 2018-05-31

## 2018-05-31 DIAGNOSIS — M70.61 TROCHANTERIC BURSITIS OF BOTH HIPS: ICD-10-CM

## 2018-05-31 DIAGNOSIS — Z79.899 ENCOUNTER FOR LONG-TERM (CURRENT) USE OF HIGH-RISK MEDICATION: ICD-10-CM

## 2018-05-31 DIAGNOSIS — G89.4 CHRONIC PAIN SYNDROME: ICD-10-CM

## 2018-05-31 DIAGNOSIS — F11.20 OPIOID USE DISORDER, MODERATE, DEPENDENCE (HCC): ICD-10-CM

## 2018-05-31 DIAGNOSIS — R29.3 ABNORMAL POSTURE: ICD-10-CM

## 2018-05-31 DIAGNOSIS — M51.37 DEGENERATION OF LUMBAR OR LUMBOSACRAL INTERVERTEBRAL DISC: ICD-10-CM

## 2018-05-31 DIAGNOSIS — G43.919 INTRACTABLE MIGRAINE WITHOUT STATUS MIGRAINOSUS, UNSPECIFIED MIGRAINE TYPE: ICD-10-CM

## 2018-05-31 DIAGNOSIS — M70.62 TROCHANTERIC BURSITIS OF BOTH HIPS: ICD-10-CM

## 2018-05-31 DIAGNOSIS — M54.81 BILATERAL OCCIPITAL NEURALGIA: Primary | ICD-10-CM

## 2018-05-31 RX ORDER — METHADONE HYDROCHLORIDE 10 MG/1
TABLET ORAL
Qty: 210 TAB | Refills: 0 | Status: SHIPPED | OUTPATIENT
Start: 2018-06-08 | End: 2018-06-26 | Stop reason: SDUPTHER

## 2018-05-31 RX ORDER — METHADONE HYDROCHLORIDE 10 MG/1
10 TABLET ORAL
Qty: 210 TAB | Refills: 0 | Status: SHIPPED | OUTPATIENT
Start: 2018-06-08 | End: 2018-05-31 | Stop reason: CLARIF

## 2018-05-31 NOTE — PROGRESS NOTES
Referral date November 2013, From pain management center in [place]  for chronic widespread pain and headaches. HPI:  Eagle Rivera is a 59 y.o. female here for f/u visit for ongoing evaluation of chronic widespread pain and headaches. Pt was last seen here on 05/04/2018. Pt states she \"hurts like hell\" and has conitnued back pain, \"more headaches\" and she is \"unable to function\". Pain is located lumbosacral belt line and into the right gluteal region, bilateral knees, cervical spine. The pain is described as constant aching and burning . Pain at its best is 5/10. Pain at its worse is 8/10.      Social History     Social History    Marital status:      Spouse name: N/A    Number of children: N/A    Years of education: N/A     Occupational History    retired, , ,       Social History Main Topics    Smoking status: Never Smoker    Smokeless tobacco: Never Used    Alcohol use No    Drug use: No    Sexual activity: Not Currently     Other Topics Concern    Not on file     Social History Narrative     Family History   Problem Relation Age of Onset    Alcohol abuse Father     Diabetes Sister     Migraines Daughter     Diabetes Maternal Aunt     Cancer Maternal Aunt     Diabetes Maternal Uncle     Diabetes Paternal Aunt     Diabetes Paternal Uncle     Heart Attack Maternal Grandmother      Allergies   Allergen Reactions    Zanaflex [Tizanidine] Other (comments)     Nausea and worsening headache     Past Medical History:   Diagnosis Date    Anxiety     Cataract 6/19/15    bilat    Chronic sialoadenitis     Degenerative disc disease     Depression     Diabetes ([facility] [address] 2012    Dry eye     Dyslipidemia     Fatigue     uses ritalin for this    Fibromyalgia     GERD (gastroesophageal reflux disease)     Headache(784.0)     daily, all her life    Hypertension     OCD (obsessive compulsive disorder)     PTSD (post-traumatic stress disorder)     Seizure disorder ([facility] [address]     Thyroid dysfunction     TMJ (temporomandibular joint disorder)     right    Viral encephalitis     unknown type from mosquito     Past Surgical History:   Procedure Laterality Date    HX  SECTION      X 2    HX COLONOSCOPY  2012    ? f/u    HX PARTIAL HYSTERECTOMY       Current Outpatient Prescriptions on File Prior to Visit   Medication Sig    simvastatin (ZOCOR) 40 mg tablet TAKE 1 TABLET BY MOUTH EVERY NIGHT AT BEDTIME    metFORMIN ER (GLUCOPHAGE XR) 750 mg tablet Take 1 Tab by mouth daily. Indications: type 2 diabetes mellitus    docusate sodium (COLACE) 100 mg capsule Take 1 Cap by mouth two (2) times daily as needed for Constipation for up to 90 days.  cyclobenzaprine (FLEXERIL) 10 mg tablet TAKE 1 TABLET BY MOUTH TWICE DAILY    promethazine (PHENERGAN) 25 mg tablet Take 1 Tab by mouth every six (6) hours as needed for Nausea. Indications: nausea    atenolol (TENORMIN) 25 mg tablet Take 1 Tab by mouth daily. Indications: hypertension    topiramate (TOPAMAX) 50 mg tablet Take 3 Tabs by mouth two (2) times a day. Indications: MIGRAINE PREVENTION, seizure    simvastatin (ZOCOR) 40 mg tablet TAKE 1 TABLET BY MOUTH EVERY NIGHT AT BEDTIME  Indications: hypercholesterolemia    LORazepam (ATIVAN) 1 mg tablet TAKE 1 TABLET BY MOUTH TWICE DAILY AS NEEDED FOR ANXIETY    fluorometholone (FML) 0.1 % ophthalmic suspension SHAKE LQ AND INT 1 GTT IN OU BID    methylphenidate (RITALIN) 10 mg tablet Take 1 Tab by mouth three (3) times daily. (Patient taking differently: Take 10 mg by mouth three (3) times daily as needed.)    NEXIUM 40 mg capsule TK 1 C PO QD AT 3PM    OTHER DDS Lumbar Traction Belt  Use as directed    senna-docusate (PERICOLACE) 8.6-50 mg per tablet 1-2 tabs daily as needed for constipation    XIIDRA 5 % dpet      No current facility-administered medications on file prior to visit.          Review of Systems   Gastrointestinal: Negative for constipation. Musculoskeletal: Positive for back pain and neck pain. Neurological: Positive for headaches (seeing neurologist ). Opioid specific risk: Concurrent benzodiazepine use, depression, family history of suicide, family history of substance use disorder, PTSD, depression, did not  the Narcan nasal spray which was previously ordered, diabetes, anxiety. Seizure disorder . Opioid specific history:  chronic opioid therapy continuously without breaks for 21 years. Vitals:    05/31/18 1347   BP: 145/87   Pulse: (!) 110   Resp: 16   Temp: 98.9 °F (37.2 °C)   TempSrc: Oral   Weight: 72.6 kg (160 lb)   Height: 5' 1\" (1.549 m)   PainSc:   6   PainLoc: Back        Imaging:     XR SPINE LUMB COMP W BEND on 5/25/2018  \"\"IMPRESSION: 7 views of the lumbar spine including flexion and extension views reveal first degree anterolisthesis of L5 4 on L5 which increases from 3.4 mm on extension to 5 mm on flexion. There is first degree anterolisthesis of L5 on S1 that increases from 3.4 mm on extension to 5.7 mm on flexion. There is chronic discogenic disease at L to 3 with disc space narrowing sclerosis and small anterior osteophytes. Small anterior aspect of present at L3-4. Vertebral body  stature and alignment are otherwise maintained throughout. No facet arthropathy. Stool in the colon is indicative of constipation. \"\"     XR HIPS BI W AP PELV on 5/25/2018  \"\"IMPRESSION: 2 views of the bony pelvis and right hip joint reveal they are intact. No fracture or dislocation. Stool in the colon is indicative of constipation. \"\"     EKG: re-ordered today as patient did not get done    PE:  AFVSS with noted tachycardia, no acute distress, normal body habitus. A&OXs 3. Normocephalic, atraumatic. Conjugate gaze, clear sclerae. Somewhat flattened affect, intermittent generalized tremor. Gait is within functional limits with decreased evelyne. Balance is within functional limits.     TTP at bilat suboccipitals and bilat cervical paraspinals. Decreased segmental motion at multiple cervical levels bilat. Active TP at bilat upper trap regions. Patient is highly reactive to physical exam With tenderness to palpation throughout    Calculated MEQ - 240 and reduced to 210  Naloxone rescue - Yes  Prophylactic bowel program - Yes  Date of last OCA 01/29/2018  Last UDS 01/29/2018, consistent   date checked Today, findings consistent    Primary Care Physician  Lizbeth Elizondo [phone] [facility]  [phone]    Today     Last Visit      PRINCE - 44%    32%  PGIC - did not complete  5 and 4  COMM - 2    6    PHQ -- . PHQ over the last two weeks 5/31/2018   Little interest or pleasure in doing things Not at all   Feeling down, depressed or hopeless Not at all   Total Score PHQ 2 0         Assessment/Plan:     ICD-10-CM ICD-9-CM    1. Encounter for long-term (current) use of high-risk medication Z79.899 V58.69 EKG, 12 LEAD, INITIAL      DISCONTINUED: methadone (DOLOPHINE) 10 mg tablet   2. Intractable migraine without status migrainosus, unspecified migraine type G43.919 346.91    3. Bilateral occipital neuralgia M54.81 723.8    4. Abnormal posture R29.3 781.92    5. Chronic pain syndrome G89.4 338.4 methadone (DOLOPHINE) 10 mg tablet   6. Opioid use disorder, moderate, dependence (HCC) F11.20 304.00    7. Trochanteric bursitis of both hips M70.61 726.5     M70.62     8. Degeneration of lumbar or lumbosacral intervertebral disc M51.37 722.52 REFERRAL TO SPINE SURGERY        Patient continues to have multiple risk factors regarding long-term opioid treatment for chronic pain and has been concurrently treated with opioids consistently for 21 years. She is currently receiving methadone 10 mg tablets to take 30 mg in the am, 20 mg at midday and 30 mg at night as needed for pain.      Will continue her planned 10% per month reduction in morphine marcella equivalents until she is off opioids with regard to patients safety and tolerance. Today she received methadone 10mg tablets to take 30mg in the am, 20mg at midday and 20mg at night as needed for pain. Patient was educated on signs and symptoms of withdrawal and instructed to call the clinic if she has difficulty maintaining the decreased dose of opioids    EKG reordered and EKG must be completed by next visit or will accelerate the wean due to safety. Patient has appointment with Neurology 06/22/2018 to resume treatments for continued worsening migraines. Lumbar X-rays reviewed and patient referred to Ortho/spine Dr Helio Smith to evaluate and treat. Will continue to consider treatments requested last visit to include PT, Behavioral health, Pain psychology. Continue evaluation of bilateral greater trochanteric bursitis, limb length differences with a short right leg, bilateral sacroiliitis, and multiple active trigger points in bilateral lumbosacral region. GOALS:  To establish complementary and integrative plan of care to address chronic pain issues while minimizing pharmaceuticals to maximize patient's function improve quality of life. Follow up ongoing assessment and ongoing development of integrative and comprehensive plan of care for chronic pain. Education:  Patient again educated on the importance of strict compliance with the opioid care agreement while on opioid therapy. Patient also again educated that they should avoid driving while on chronic opioid therapy. Also advised to avoid alcohol and to avoid benzodiazepines while on opioid therapy. Patient Homework: To continue to seek out addiction treatment center in her network      F/u:. Follow-up Disposition:  Return in about 30 days (around 6/30/2018) for 30 mins.

## 2018-05-31 NOTE — MR AVS SNAPSHOT
[address] [address] 
[phone] Patient: Danae Barrientos MRN: [id number] SKM:[id number] Visit Information Date & Time Provider Department Dept. Phone Encounter #  
 5/31/2018  2:00 PM Brendan Biggs [facility] [phone] [id number] Follow-up Instructions Return in about 30 days (around 6/30/2018) for 30 mins. Your Appointments 7/9/2018  3:30 PM  
Office Visit with Jeremy Hernandez MD  
[address]) Appt Note: 3 month f/u HTN and DM and chol/med refill  
 [address]  
  
   
 [address] [facility] Upcoming Health Maintenance Date Due COLONOSCOPY 1/20/2017 EYE EXAM RETINAL OR DILATED Q1 7/3/2018* Pneumococcal 19-64 Medium Risk (1 of 1 - PPSV23) 10/1/2018* DTaP/Tdap/Td series (1 - Tdap) 12/26/2018* ZOSTER VACCINE AGE 60> 4/4/2019* FOOT EXAM Q1 7/6/2018 MICROALBUMIN Q1 7/6/2018 Influenza Age 5 to Adult 8/1/2018 HEMOGLOBIN A1C Q6M 10/4/2018 MEDICARE YEARLY EXAM 12/27/2018 BREAST CANCER SCRN MAMMOGRAM 1/17/2019 LIPID PANEL Q1 4/4/2019 PAP AKA CERVICAL CYTOLOGY 12/26/2020 *Topic was postponed. The date shown is not the original due date. Allergies as of 5/31/2018  Review Complete On: 5/31/2018 By: Brendan Biggs DO Severity Noted Reaction Type Reactions Zanaflex [Tizanidine]  04/18/2016   Side Effect Other (comments) Nausea and worsening headache Current Immunizations  Reviewed on 4/3/2018 Name Date Influenza Vaccine 9/30/2015  2:50 PM  
 Influenza Vaccine (Quad) PF 12/26/2017, 12/15/2016 Pneumococcal Conjugate (PCV-13) 12/15/2016 Not reviewed this visit You Were Diagnosed With   
  
 Codes Comments Encounter for long-term (current) use of high-risk medication    -  Primary ICD-10-CM: Q01.390 ICD-9-CM: V58.69 Intractable migraine without status migrainosus, unspecified migraine type     ICD-10-CM: G43.919 ICD-9-CM: 346.91 Bilateral occipital neuralgia     ICD-10-CM: M54.81 ICD-9-CM: 723.8 Abnormal posture     ICD-10-CM: R29.3 ICD-9-CM: 781.92 Chronic pain syndrome     ICD-10-CM: G89.4 ICD-9-CM: 338.4 Opioid use disorder, moderate, dependence (HCC)     ICD-10-CM: R48.90 ICD-9-CM: 304.00 Trochanteric bursitis of both hips     ICD-10-CM: M70.61, M70.62 ICD-9-CM: 726.5 Vitals BP Pulse Temp Resp Height(growth percentile) Weight(growth percentile) 145/87 (BP 1 Location: Left arm, BP Patient Position: Sitting) (!) 110 98.9 °F (37.2 °C) (Oral) 16 5' 1\" (1.549 m) 160 lb (72.6 kg) BMI OB Status Smoking Status 30.23 kg/m2 Hysterectomy Never Smoker Vitals History BMI and BSA Data Body Mass Index Body Surface Area  
 30.23 kg/m 2 1.77 m 2 Preferred Pharmacy Pharmacy Name Phone [phone] [facility], [address] [place] [address]  [phone] Your Updated Medication List  
  
   
This list is accurate as of 5/31/18  3:11 PM.  Always use your most recent med list.  
  
  
  
  
 atenolol 25 mg tablet Commonly known as:  TENORMIN Take 1 Tab by mouth daily. Indications: hypertension  
  
 cyclobenzaprine 10 mg tablet Commonly known as:  FLEXERIL  
TAKE 1 TABLET BY MOUTH TWICE DAILY docusate sodium 100 mg capsule Commonly known as:  Frank Rodriguez Take 1 Cap by mouth two (2) times daily as needed for Constipation for up to 90 days. fluorometholone 0.1 % ophthalmic suspension Commonly known as: [address] [facility] LQ AND INT 1 GTT IN OU BID LORazepam 1 mg tablet Commonly known as:  ATIVAN  
TAKE 1 TABLET BY MOUTH TWICE DAILY AS NEEDED FOR ANXIETY metFORMIN  mg tablet Commonly known as:  GLUCOPHAGE XR Take 1 Tab by mouth daily. Indications: type 2 diabetes mellitus  
  
 methadone 10 mg tablet Commonly known as:  DOLOPHINE Take 30mg PO in am. Take 20mg PO at midday. Take 20mg PO in the evenings for up to 30 days. Max Daily Amount: 70 mg. Indications: Chronic Pain, Severe Pain Start taking on:  6/8/2018  
  
 methylphenidate HCl 10 mg tablet Commonly known as:  RITALIN Take 1 Tab by mouth three (3) times daily. NexIUM 40 mg capsule Generic drug:  esomeprazole TK 1 C PO QD AT 3PM  
  
 OTHER  
DDS Lumbar Traction Belt Use as directed  
  
 promethazine 25 mg tablet Commonly known as:  PHENERGAN Take 1 Tab by mouth every six (6) hours as needed for Nausea. Indications: nausea  
  
 senna-docusate 8.6-50 mg per tablet Commonly known as:  PERICOLACE  
1-2 tabs daily as needed for constipation * simvastatin 40 mg tablet Commonly known as:  ZOCOR  
TAKE 1 TABLET BY MOUTH EVERY NIGHT AT BEDTIME  Indications: hypercholesterolemia * simvastatin 40 mg tablet Commonly known as:  ZOCOR  
TAKE 1 TABLET BY MOUTH EVERY NIGHT AT BEDTIME  
  
 topiramate 50 mg tablet Commonly known as:  TOPAMAX Take 3 Tabs by mouth two (2) times a day. Indications: MIGRAINE PREVENTION, seizure XIIDRA 5 % Dpet Generic drug:  lifitegrast  
  
 * Notice: This list has 2 medication(s) that are the same as other medications prescribed for you. Read the directions carefully, and ask your doctor or other care provider to review them with you. Prescriptions Printed Refills  
 methadone (DOLOPHINE) 10 mg tablet 0 Starting on: 6/8/2018 Sig: Take 30mg PO in am. Take 20mg PO at midday. Take 20mg PO in the evenings for up to 30 days. Max Daily Amount: 70 mg. Indications: Chronic Pain, Severe Pain Class: Print Follow-up Instructions Return in about 30 days (around 6/30/2018) for 30 mins. To-Do List   
 06/07/2018 ECG:  EKG, 12 LEAD, INITIAL Patient Instructions Learning About Sleeping Well What does sleeping well mean? Sleeping well means getting enough sleep. How much sleep is enough varies among people. The number of hours you sleep is not as important as how you feel when you wake up. If you do not feel refreshed, you probably need more sleep. Another sign of not getting enough sleep is feeling tired during the day. The average total nightly sleep time is 7½ to 8 hours. Healthy adults may need a little more or a little less than this. Why is getting enough sleep important? Getting enough quality sleep is a basic part of good health. When your sleep suffers, your mood and your thoughts can suffer too. You may find yourself feeling more grumpy or stressed. Not getting enough sleep also can lead to serious problems, including injury, accidents, anxiety, and depression. What might cause poor sleeping? Many things can cause sleep problems, including: · Stress. Stress can be caused by fear about a single event, such as giving a speech. Or you may have ongoing stress, such as worry about work or school. · Depression, anxiety, and other mental or emotional conditions. · Changes in your sleep habits or surroundings. This includes changes that happen where you sleep, such as noise, light, or sleeping in a different bed. It also includes changes in your sleep pattern, such as having jet lag or working a late shift. · Health problems, such as pain, breathing problems, and restless legs syndrome. · Lack of regular exercise. How can you help yourself? Here are some tips that may help you sleep more soundly and wake up feeling more refreshed. Your sleeping area · Use your bedroom only for sleeping and sex. A bit of light reading may help you fall asleep. But if it doesn't, do your reading elsewhere in the house. Don't watch TV in bed. · Be sure your bed is big enough to stretch out comfortably, especially if you have a sleep partner. · Keep your bedroom quiet, dark, and cool. Use curtains, blinds, or a sleep mask to block out light. To block out noise, use earplugs, soothing music, or a \"white noise\" machine. Your evening and bedtime routine · Create a relaxing bedtime routine. You might want to take a warm shower or bath, listen to soothing music, or drink a cup of noncaffeinated tea. · Go to bed at the same time every night. And get up at the same time every morning, even if you feel tired. What to avoid · Limit caffeine (coffee, tea, caffeinated sodas) during the day, and don't have any for at least 4 to 6 hours before bedtime. · Don't drink alcohol before bedtime. Alcohol can cause you to wake up more often during the night. · Don't smoke or use tobacco, especially in the evening. Nicotine can keep you awake. · Don't take naps during the day, especially close to bedtime. · Don't lie in bed awake for too long. If you can't fall asleep, or if you wake up in the middle of the night and can't get back to sleep within 15 minutes or so, get out of bed and go to another room until you feel sleepy. · Don't take medicine right before bed that may keep you awake or make you feel hyper or energized. Your doctor can tell you if your medicine may do this and if you can take it earlier in the day. If you can't sleep · Imagine yourself in a peaceful, pleasant scene. Focus on the details and feelings of being in a place that is relaxing. · Get up and do a quiet or boring activity until you feel sleepy. · Don't drink any liquids after 6 p.m. if you wake up often because you have to go to the bathroom. Where can you learn more? Go to http://mary-consuelo.info/. Enter W266 in the search box to learn more about \"Learning About Sleeping Well. \" Current as of: May 12, 2017 Content Version: 11.4 © 2843-9633 Now In Store. Care instructions adapted under license by [employer] (which disclaims liability or warranty for this information). If you have questions about a medical condition or this instruction, always ask your healthcare professional. [address] any warranty or liability for your use of this information. Safe Use of Opioid Pain Medicine: Care Instructions Your Care Instructions Pain is your body's way of warning you that something is wrong. Pain feels different for everybody. Only you can describe your pain. A doctor can suggest or prescribe many types of medicines for pain. These range from over-the-counter medicines like acetaminophen (Tylenol) to powerful medicines called opioids. Examples of opioids are fentanyl, hydrocodone, morphine, and oxycodone. Heroin is an illegal opioid Opioids are strong medicines. They can help you manage pain when you use them the right way. But if you misuse them, they can cause serious harm and even death. For these reasons, doctors are very careful about how they prescribe opioids. If you decide to take opioids, here are some things to remember. · Keep your doctor informed. You can get addicted to opioids. The risk is higher if you have a history of substance use. Your doctor will monitor you closely for signs of misuse and addiction and to figure out when you no longer need to take opioids. · Make a treatment plan. The goal of your plan is to be able to function and do the things you need to do, even if you still have some pain. You might be able to manage your pain with other non-opioid options like physical therapy, relaxation, or over-the-counter pain medicines. · Be aware of the side effects. Opioids can cause serious side effects, such as constipation, dry mouth, and nausea. And over time, you may need a higher dose to get pain relief. This is called tolerance.  Your body also gets used to opioids. This is called physical dependence. If you suddenly stop taking them, you may have withdrawal symptoms. The doctor carefully considered what pain medicine is right for you. You may not have received opioids if your doctor was concerned about drug interactions or your safety, or if he or she had other concerns. It is best to have one doctor or clinic treat your pain. This way you will get the pain medicine that will help you the most. And a doctor will be able to watch for any problems that the medicine might cause. The doctor has checked you carefully, but problems can develop later. If you notice any problems or new symptoms,  get medical treatment right away. Follow-up care is a key part of your treatment and safety. Be sure to make and go to all appointments, and call your doctor if you are having problems. It's also a good idea to know your test results and keep a list of the medicines you take. How can you care for yourself at home? · If you need to take opioids to manage your pain, remember these safety tips. ¨ Follow directions carefully. It's easy to misuse opioids if you take a dose other than what's prescribed by your doctor. This can lead to overdose and even death. Even sharing them with someone they weren't meant for is misuse. ¨ Be cautious. Opioids may affect your judgment and decision making. Do not drive or operate machinery until you can think clearly. Talk with your doctor about when it is safe to drive. ¨ Reduce the risk of drug interactions. Opioids can be dangerous if you take them with alcohol or with certain drugs like sleeping pills and muscle relaxers. Make sure your doctor knows about all the other medicines you take, including over-the-counter medicines. Don't start any new medicines before you talk to your doctor or pharmacist. 
IdeaOffer Keep others safe. Store opioids in a safe and secure place.  Make sure that pets, children, friends, and family can't get to them. When you're done using opioids, make sure to properly dispose of them. You can either use a community drug take-back program or your drugstore's mail-back program. If one of these programs isn't available, you can flush opioid skin patches and unused opioid pills down the toilet. ¨ Reduce the risk of overdose. Misuse of opioids can be very dangerous. Protect yourself by asking your doctor about a naloxone rescue kit. It can help you-and even save your life-if you take too much of an opioid. · Try other ways to reduce pain. ¨ Relax, and reduce stress. Relaxation techniques such as deep breathing or meditation can help. ¨ Keep moving. Gentle, daily exercise can help reduce pain over the long run. Try low- or no-impact exercises such as walking, swimming, and stationary biking. Do stretches to stay flexible. ¨ Try heat, cold packs, and massage. ¨ Get enough sleep. Pain can make you tired and drain your energy. Talk with your doctor if you have trouble sleeping because of pain. ¨ Think positive. Your thoughts can affect your pain level. Do things that you enjoy to distract yourself when you have pain instead of focusing on the pain. See a movie, read a book, listen to music, or spend time with a friend. · If you are not taking a prescription pain medicine, ask your doctor if you can take an over-the-counter medicine. When should you call for help? Call your doctor now or seek immediate medical care if: 
? · You have a new kind of pain. ? · You have new symptoms, such as a fever or rash, along with the pain. ? Watch closely for changes in your health, and be sure to contact your doctor if: 
? · You think you might be using too much pain medicine, and you need help to use less or stop. ? · Your pain gets worse. ? · You would like a referral to a doctor or clinic that specializes in pain management. Where can you learn more? Go to http://mary-consuelo.info/. Enter R108 in the search box to learn more about \"Safe Use of Opioid Pain Medicine: Care Instructions. \" Current as of: October 14, 2016 Content Version: 11.4 © 8188-4291 [employer]. Care instructions adapted under license by [employer] (which disclaims liability or warranty for this information). If you have questions about a medical condition or this instruction, always ask your healthcare professional. [address] any warranty or liability for your use of this information. Introducing \[facility]\"" & HEALTH SERVICES! [facility] introduces [employer] patient portal. Now you can access parts of your medical record, email your doctor's office, and request medication refills online. 1. In your internet browser, go to [email or web address] 2. Click on the First Time User? Click Here link in the Sign In box. You will see the New Member Sign Up page. 3. Enter your [employer] Access Code exactly as it appears below. You will not need to use this code after youve completed the sign-up process. If you do not sign up before the expiration date, you must request a new code. · [employer] Access Code: [email or web address] Expires: 8/2/2018  1:00 PM 
 
4. Enter the last four digits of your Social Security Number (xxxx) and Date of Birth (mm/dd/yyyy) as indicated and click Submit. You will be taken to the next sign-up page. 5. Create a Exost ID. This will be your [employer]ify login ID and cannot be changed, so think of one that is secure and easy to remember. 6. Create a [employer] password. You can change your password at any time. 7. Enter your Password Reset Question and Answer. This can be used at a later time if you forget your password. 8. Enter your e-mail address. You will receive e-mail notification when new information is available in [address] 9. Click Sign Up. You can now view and download portions of your medical record. 10. Click the Download Summary menu link to download a portable copy of your medical information. If you have questions, please visit the Frequently Asked Questions section of the Alumni[employer]under website. Remember, [employer] is NOT to be used for urgent needs. For medical emergencies, dial 911. Now available from your iPhone and Android! Please provide this summary of care documentation to your next provider. Your primary care clinician is listed as [facility]. If you have any questions after today's visit, please call [phone].

## 2018-05-31 NOTE — PATIENT INSTRUCTIONS
Learning About Sleeping Well  What does sleeping well mean? Sleeping well means getting enough sleep. How much sleep is enough varies among people. The number of hours you sleep is not as important as how you feel when you wake up. If you do not feel refreshed, you probably need more sleep. Another sign of not getting enough sleep is feeling tired during the day. The average total nightly sleep time is 7½ to 8 hours. Healthy adults may need a little more or a little less than this. Why is getting enough sleep important? Getting enough quality sleep is a basic part of good health. When your sleep suffers, your mood and your thoughts can suffer too. You may find yourself feeling more grumpy or stressed. Not getting enough sleep also can lead to serious problems, including injury, accidents, anxiety, and depression. What might cause poor sleeping? Many things can cause sleep problems, including:  · Stress. Stress can be caused by fear about a single event, such as giving a speech. Or you may have ongoing stress, such as worry about work or school. · Depression, anxiety, and other mental or emotional conditions. · Changes in your sleep habits or surroundings. This includes changes that happen where you sleep, such as noise, light, or sleeping in a different bed. It also includes changes in your sleep pattern, such as having jet lag or working a late shift. · Health problems, such as pain, breathing problems, and restless legs syndrome. · Lack of regular exercise. How can you help yourself? Here are some tips that may help you sleep more soundly and wake up feeling more refreshed. Your sleeping area  · Use your bedroom only for sleeping and sex. A bit of light reading may help you fall asleep. But if it doesn't, do your reading elsewhere in the house. Don't watch TV in bed. · Be sure your bed is big enough to stretch out comfortably, especially if you have a sleep partner.   · Keep your bedroom quiet, dark, and cool. Use curtains, blinds, or a sleep mask to block out light. To block out noise, use earplugs, soothing music, or a \"white noise\" machine. Your evening and bedtime routine  · Create a relaxing bedtime routine. You might want to take a warm shower or bath, listen to soothing music, or drink a cup of noncaffeinated tea. · Go to bed at the same time every night. And get up at the same time every morning, even if you feel tired. What to avoid  · Limit caffeine (coffee, tea, caffeinated sodas) during the day, and don't have any for at least 4 to 6 hours before bedtime. · Don't drink alcohol before bedtime. Alcohol can cause you to wake up more often during the night. · Don't smoke or use tobacco, especially in the evening. Nicotine can keep you awake. · Don't take naps during the day, especially close to bedtime. · Don't lie in bed awake for too long. If you can't fall asleep, or if you wake up in the middle of the night and can't get back to sleep within 15 minutes or so, get out of bed and go to another room until you feel sleepy. · Don't take medicine right before bed that may keep you awake or make you feel hyper or energized. Your doctor can tell you if your medicine may do this and if you can take it earlier in the day. If you can't sleep  · Imagine yourself in a peaceful, pleasant scene. Focus on the details and feelings of being in a place that is relaxing. · Get up and do a quiet or boring activity until you feel sleepy. · Don't drink any liquids after 6 p.m. if you wake up often because you have to go to the bathroom. Where can you learn more? Go to http://mary-consuelo.info/. Enter N887 in the search box to learn more about \"Learning About Sleeping Well. \"  Current as of: May 12, 2017  Content Version: 11.4  © 7446-8994 [employer], [facility].  Care instructions adapted under license by [employer] (which disclaims liability or warranty for this information). If you have questions about a medical condition or this instruction, always ask your healthcare professional. [address] any warranty or liability for your use of this information. Safe Use of Opioid Pain Medicine: Care Instructions  Your Care Instructions  Pain is your body's way of warning you that something is wrong. Pain feels different for everybody. Only you can describe your pain. A doctor can suggest or prescribe many types of medicines for pain. These range from over-the-counter medicines like acetaminophen (Tylenol) to powerful medicines called opioids. Examples of opioids are fentanyl, hydrocodone, morphine, and oxycodone. Heroin is an illegal opioid  Opioids are strong medicines. They can help you manage pain when you use them the right way. But if you misuse them, they can cause serious harm and even death. For these reasons, doctors are very careful about how they prescribe opioids. If you decide to take opioids, here are some things to remember. · Keep your doctor informed. You can get addicted to opioids. The risk is higher if you have a history of substance use. Your doctor will monitor you closely for signs of misuse and addiction and to figure out when you no longer need to take opioids. · Make a treatment plan. The goal of your plan is to be able to function and do the things you need to do, even if you still have some pain. You might be able to manage your pain with other non-opioid options like physical therapy, relaxation, or over-the-counter pain medicines. · Be aware of the side effects. Opioids can cause serious side effects, such as constipation, dry mouth, and nausea. And over time, you may need a higher dose to get pain relief. This is called tolerance. Your body also gets used to opioids. This is called physical dependence. If you suddenly stop taking them, you may have withdrawal symptoms.   The doctor carefully considered what pain medicine is right for you. You may not have received opioids if your doctor was concerned about drug interactions or your safety, or if he or she had other concerns. It is best to have one doctor or clinic treat your pain. This way you will get the pain medicine that will help you the most. And a doctor will be able to watch for any problems that the medicine might cause. The doctor has checked you carefully, but problems can develop later. If you notice any problems or new symptoms,  get medical treatment right away. Follow-up care is a key part of your treatment and safety. Be sure to make and go to all appointments, and call your doctor if you are having problems. It's also a good idea to know your test results and keep a list of the medicines you take. How can you care for yourself at home? · If you need to take opioids to manage your pain, remember these safety tips. ¨ Follow directions carefully. It's easy to misuse opioids if you take a dose other than what's prescribed by your doctor. This can lead to overdose and even death. Even sharing them with someone they weren't meant for is misuse. ¨ Be cautious. Opioids may affect your judgment and decision making. Do not drive or operate machinery until you can think clearly. Talk with your doctor about when it is safe to drive. ¨ Reduce the risk of drug interactions. Opioids can be dangerous if you take them with alcohol or with certain drugs like sleeping pills and muscle relaxers. Make sure your doctor knows about all the other medicines you take, including over-the-counter medicines. Don't start any new medicines before you talk to your doctor or pharmacist.  [place] Keep others safe. Store opioids in a safe and secure place. Make sure that pets, children, friends, and family can't get to them. When you're done using opioids, make sure to properly dispose of them.  You can either use a community drug take-back program or your drugstore's mail-back program. If one of these programs isn't available, you can flush opioid skin patches and unused opioid pills down the toilet. ¨ Reduce the risk of overdose. Misuse of opioids can be very dangerous. Protect yourself by asking your doctor about a naloxone rescue kit. It can help you-and even save your life-if you take too much of an opioid. · Try other ways to reduce pain. ¨ Relax, and reduce stress. Relaxation techniques such as deep breathing or meditation can help. ¨ Keep moving. Gentle, daily exercise can help reduce pain over the long run. Try low- or no-impact exercises such as walking, swimming, and stationary biking. Do stretches to stay flexible. ¨ Try heat, cold packs, and massage. ¨ Get enough sleep. Pain can make you tired and drain your energy. Talk with your doctor if you have trouble sleeping because of pain. ¨ Think positive. Your thoughts can affect your pain level. Do things that you enjoy to distract yourself when you have pain instead of focusing on the pain. See a movie, read a book, listen to music, or spend time with a friend. · If you are not taking a prescription pain medicine, ask your doctor if you can take an over-the-counter medicine. When should you call for help? Call your doctor now or seek immediate medical care if:  ? · You have a new kind of pain. ? · You have new symptoms, such as a fever or rash, along with the pain. ? Watch closely for changes in your health, and be sure to contact your doctor if:  ? · You think you might be using too much pain medicine, and you need help to use less or stop. ? · Your pain gets worse. ? · You would like a referral to a doctor or clinic that specializes in pain management. Where can you learn more? Go to http://mary-consuelo.info/. Enter R108 in the search box to learn more about \"Safe Use of Opioid Pain Medicine: Care Instructions. \"  Current as of: October 14, 2016  Content Version: 11.4  © 4076-1143 [employer]. Care instructions adapted under license by [employer] (which disclaims liability or warranty for this information). If you have questions about a medical condition or this instruction, always ask your healthcare professional. [address] any warranty or liability for your use of this information.

## 2018-06-25 ENCOUNTER — OFFICE VISIT (OUTPATIENT)
Dept: PAIN MANAGEMENT | Age: 58
End: 2018-06-25

## 2018-06-25 VITALS
DIASTOLIC BLOOD PRESSURE: 77 MMHG | TEMPERATURE: 99.2 F | HEART RATE: 106 BPM | BODY MASS INDEX: 30.96 KG/M2 | SYSTOLIC BLOOD PRESSURE: 144 MMHG | HEIGHT: 61 IN | WEIGHT: 164 LBS | RESPIRATION RATE: 14 BRPM

## 2018-06-25 DIAGNOSIS — M70.61 TROCHANTERIC BURSITIS OF BOTH HIPS: ICD-10-CM

## 2018-06-25 DIAGNOSIS — M70.62 TROCHANTERIC BURSITIS OF BOTH HIPS: ICD-10-CM

## 2018-06-25 DIAGNOSIS — M46.1 SACROILIITIS, NOT ELSEWHERE CLASSIFIED (HCC): ICD-10-CM

## 2018-06-25 DIAGNOSIS — M54.81 BILATERAL OCCIPITAL NEURALGIA: ICD-10-CM

## 2018-06-25 DIAGNOSIS — K59.00 CONSTIPATION, UNSPECIFIED CONSTIPATION TYPE: ICD-10-CM

## 2018-06-25 DIAGNOSIS — M51.37 DEGENERATION OF LUMBAR OR LUMBOSACRAL INTERVERTEBRAL DISC: Primary | ICD-10-CM

## 2018-06-25 DIAGNOSIS — Z79.899 ENCOUNTER FOR LONG-TERM (CURRENT) USE OF HIGH-RISK MEDICATION: ICD-10-CM

## 2018-06-25 LAB
ALCOHOL UR POC: NORMAL
AMPHETAMINES UR POC: NEGATIVE
BARBITURATES UR POC: NORMAL
BENZODIAZEPINES UR POC: NORMAL
BUPRENORPHINE UR POC: NEGATIVE
CANNABINOIDS UR POC: NEGATIVE
CARISOPRODOL UR POC: NORMAL
COCAINE UR POC: NEGATIVE
FENTANYL UR POC: NORMAL
MDMA/ECSTASY UR POC: NORMAL
METHADONE UR POC: NORMAL
METHAMPHETAMINE UR POC: NORMAL
METHYLPHENIDATE UR POC: NORMAL
OPIATES UR POC: NEGATIVE
OXYCODONE UR POC: NEGATIVE
PHENCYCLIDINE UR POC: NORMAL
PROPOXYPHENE UR POC: NORMAL
TRAMADOL UR POC: NORMAL
TRICYCLICS UR POC: NORMAL

## 2018-06-25 RX ORDER — METHADONE HYDROCHLORIDE 10 MG/1
10 TABLET ORAL 3 TIMES DAILY
Qty: 180 TAB | Refills: 0 | Status: SHIPPED | OUTPATIENT
Start: 2018-08-08 | End: 2018-07-12 | Stop reason: SDUPTHER

## 2018-06-25 RX ORDER — CYCLOBENZAPRINE HCL 10 MG
10 TABLET ORAL
Qty: 90 TAB | Refills: 2 | Status: SHIPPED | OUTPATIENT
Start: 2018-06-25 | End: 2018-07-25

## 2018-06-25 RX ORDER — LIDOCAINE 50 MG/G
1 PATCH TOPICAL EVERY 12 HOURS
Qty: 30 PATCH | Refills: 1 | Status: SHIPPED | OUTPATIENT
Start: 2018-06-25 | End: 2018-07-09 | Stop reason: SDUPTHER

## 2018-06-25 NOTE — PROGRESS NOTES
Nursing Notes    Patient presents to the office today in follow-up. Patient rates her pain at 7/10 on the numerical pain scale. Reviewed medications with counts as follows:    Rx Date filled Qty Dispensed Pill Count Last Dose Short   Methadone 10mg 06/08/18 210 95 today                                           Comments: depression screen neg      POC UDS was performed in office today    Any new labs or imaging since last appointment? NO    Have you been to an emergency room (ER) or urgent care clinic since your last visit? NO            Have you been hospitalized since your last visit? NO     If yes, where, when, and reason for visit? Have you seen or consulted any other health care providers outside of the [facility]  since your last visit? NO     If yes, where, when, and reason for visit? Ms. Silvia Solis has a reminder for a \"due or due soon\" health maintenance. I have asked that she contact her primary care provider for follow-up on this health maintenance.     COMM 4

## 2018-06-25 NOTE — PROGRESS NOTES
Referral date November 2013, From pain management center in [facility] chronic widespread pain and headaches. HPI:  Eveline Martinez is a 59 y.o. female here for f/u visit for ongoing evaluation of lumbar pain, bilateral lumbosacral pain, bilateral hip pain, suboccipital pain. Pt was last seen here on May 31, 2018. Pt denies interval changes on the character or distribution of pain. Pain is located predominantly in the right lumbosacral region right gluteal and right hip region today. The pain is described as aching stabbing and pinching ranging from 5-8/10. Her neurology appointment is still pending she has been rescheduled to 3 times by the clinic. She has an appointment pending with the spine surgeon in approximately 1 month. She has been unable to obtain the Evzio which has been requested twice now. Still has not established with mental health for SYED. Has not obtained updated EKG. Flexeril not significantly he;lpful at currnet dose of 10mg bid. Constipation in interim is ongoing despite colace, senna  And MOM. Topamax still helpful fo pain and for HA. Yoga was tried only one time and she had difficulty getting on the floor. She did not try to modifiy. Reduction of the Methadone has not been easy. Methadone is not helping with pain relief. She has continuous nausea. No benefit since dose reductions.      Social History     Social History    Marital status:      Spouse name: N/A    Number of children: N/A    Years of education: N/A     Occupational History    retired, , ,       Social History Main Topics    Smoking status: Never Smoker    Smokeless tobacco: Never Used    Alcohol use No    Drug use: No    Sexual activity: Not Currently     Other Topics Concern    Not on file     Social History Narrative     Family History   Problem Relation Age of Onset    Alcohol abuse Father     Diabetes Sister     Migraines Daughter  Diabetes Maternal Aunt     Cancer Maternal Aunt     Diabetes Maternal Uncle     Diabetes Paternal Aunt     Diabetes Paternal Uncle     Heart Attack Maternal Grandmother      Allergies   Allergen Reactions    Zanaflex [Tizanidine] Other (comments)     Nausea and worsening headache     Past Medical History:   Diagnosis Date    Anxiety     Cataract 6/19/15    bilat    Chronic sialoadenitis     Degenerative disc disease     Depression     Diabetes ([facility] [address]     Dry eye     Dyslipidemia     Fatigue     uses ritalin for this    Fibromyalgia     GERD (gastroesophageal reflux disease)     Headache(784.0)     daily, all her life    Hypertension     OCD (obsessive compulsive disorder)     PTSD (post-traumatic stress disorder)     Seizure disorder ([facility]     Thyroid dysfunction     TMJ (temporomandibular joint disorder)     right    Viral encephalitis     unknown type from mosquito     Past Surgical History:   Procedure Laterality Date    HX  SECTION      X 2    HX COLONOSCOPY      ? f/u    HX PARTIAL HYSTERECTOMY       Current Outpatient Prescriptions on File Prior to Visit   Medication Sig    methadone (DOLOPHINE) 10 mg tablet Take 30mg PO in am. Take 20mg PO at midday. Take 20mg PO in the evenings for up to 30 days. Max Daily Amount: 70 mg. Indications: Chronic Pain, Severe Pain    metFORMIN ER (GLUCOPHAGE XR) 750 mg tablet Take 1 Tab by mouth daily. Indications: type 2 diabetes mellitus    senna-docusate (PERICOLACE) 8.6-50 mg per tablet 1-2 tabs daily as needed for constipation    promethazine (PHENERGAN) 25 mg tablet Take 1 Tab by mouth every six (6) hours as needed for Nausea. Indications: nausea    atenolol (TENORMIN) 25 mg tablet Take 1 Tab by mouth daily. Indications: hypertension    topiramate (TOPAMAX) 50 mg tablet Take 3 Tabs by mouth two (2) times a day.  Indications: MIGRAINE PREVENTION, seizure    simvastatin (ZOCOR) 40 mg tablet TAKE 1 TABLET BY MOUTH EVERY NIGHT AT BEDTIME  Indications: hypercholesterolemia    LORazepam (ATIVAN) 1 mg tablet TAKE 1 TABLET BY MOUTH TWICE DAILY AS NEEDED FOR ANXIETY    fluorometholone (FML) 0.1 % ophthalmic suspension SHAKE LQ AND INT 1 GTT IN OU BID    methylphenidate (RITALIN) 10 mg tablet Take 1 Tab by mouth three (3) times daily. (Patient taking differently: Take 10 mg by mouth three (3) times daily as needed.)    NEXIUM 40 mg capsule TK 1 C PO QD AT 3PM    OTHER DDS Lumbar Traction Belt  Use as directed    docusate sodium (COLACE) 100 mg capsule Take 1 Cap by mouth two (2) times daily as needed for Constipation for up to 90 days.  XIIDRA 5 % dpet      No current facility-administered medications on file prior to visit. ROS:    Negative for fever, chills, vomiting, diarrhea, bowel incontinence, bladder incontinence, suicidal ideation, homicidal ideation, chest pain, trouble breathing, dysphagia, changes in appetite, unexplained weight gains or weight loss, changes in vision, changes in hearing. Positive nausea, abdonimal cramping, constipation    Opioid specific risk:  Concurrent benzodiazepine use, depression, family history of suicide, family history of substance use disorder, PTSD, depression, diabetes, anxiety, Seizure disorder    Vitals:    06/25/18 1350   BP: 144/77   Pulse: (!) 106   Resp: 14   Temp: 99.2 °F (37.3 °C)   TempSrc: Oral   Weight: 74.4 kg (164 lb)   Height: 5' 1\" (1.549 m)   PainSc:   7   PainLoc: Head     EKG: Still pending    PE:  AFVSS with hypertension, bradycardia, low-grade fever, no acute distress, overweight. A&OXs 3.  normocephalic, atraumatic. Conjugate gaze, clear sclerae. Mood appropriate but subdued secondary to pain cooperative. Speech clear and appropriate    Decreased active range of motion for capital extension.   Patient with posture abnormalities with bilateral rounded shoulders and forward head posture and tendencies toward left cervical rotation when at rest. Suboccipital region tender to palpation bilaterally bilateral cervical paraspinals are tender to palpation bilaterally. Decreased segmental mobility bilaterally almost every cervical level. No significant limb length difference noted today. Patient reported mild reduction in her symptoms with very gentle axial distraction at each lower extremity unilaterally. No limb length difference noted            Calculated MEQ -270 at initiation of wean  Naloxone rescue -yes  Prophylactic bowel program -yes  Date of last OCA January 29, 2018. Last UDS January 29, 2018, consistent  , date checked today, findingsconsistent    Primary Care Physician  Shay Castellanos [address] [facility]  [phone]    GIC-1 and 8  PRINCE - 28% down from 44% on may 4, 2018    COMM- 4    PHQ -- . PHQ over the last two weeks 6/25/2018   Little interest or pleasure in doing things Not at all   Feeling down, depressed or hopeless Several days   Total Score PHQ 2 1       Assessment/Plan:     ICD-10-CM ICD-9-CM    1. Encounter for long-term (current) use of high-risk medication Z79.899 V58.69 DRUG SCREEN      AMB POC DRUG SCREEN ()   2. Bilateral occipital neuralgia M54.81 723.8 methadone (DOLOPHINE) 10 mg tablet      cyclobenzaprine (FLEXERIL) 10 mg tablet   3. Degeneration of lumbar or lumbosacral intervertebral disc M51.37 722.52 methadone (DOLOPHINE) 10 mg tablet      cyclobenzaprine (FLEXERIL) 10 mg tablet   4. Trochanteric bursitis of both hips M70.61 726.5 methadone (DOLOPHINE) 10 mg tablet    M70.62  cyclobenzaprine (FLEXERIL) 10 mg tablet   5. Constipation, unspecified constipation type K59.00 564.00           Methadone reduced to 10mg tabs, take 2 tabs tid. Pt to f/u next visit with Dr Kenny Coughlin. Although she reports increased intensity of pain since the reduction began on May 4 the Oswestry disability index has improved from 44% now down to 28%.     -Continue senna, Colace, milk of magnesia, increase water intake. Consider magnesium citrate if it does not resolve by the time she returns for greater occipital nerve block.    -Awaiting spine surgery input.  -Awaiting neurology f/u for chronic daily HA.   -EKG absolutely needed.     -Patient will return in approximately 1 week for a trial of greater occipital nerve blocks.  -Request sacroiliac joint orthotic provide some pain relief and hopefully improve activity tolerance and function. GOALS:  To establish complementary and integrative plan of care to address chronic pain issues while minimizing pharmaceuticals to maximize patient's function improve quality of life. Education:  Patient again educated on the importance of strict compliance with the opioid care agreement while on opioid therapy. Patient also again educated that they should avoid driving while on chronic opioid therapy. Also advised to avoid alcohol and to avoid benzodiazepines while on opioid therapy. Patient Homework:  Continue to independently investigate yoga for persons with chronic pain. F/u:. Follow-up Disposition: Not on File

## 2018-06-25 NOTE — MR AVS SNAPSHOT
[address] [facility] [address] 
[phone] Patient: Ricco Jiménez MRN: [id number] NYN:[id number] Visit Information Date & Time Provider Department Dept. Phone Encounter #  
 6/25/2018  2:00 PM [facility] [phone] Follow-up Instructions Return in about 4 weeks (around 7/23/2018) for 60 min. Your Appointments 7/3/2018  1:00 PM  
PROCEDURE with DO [facility] (CLAUDE SCHEDULING) Appt Note: BILATERAL GONB  
 [address] [facility] [address]  
[phone] [address]  
  
    
 7/9/2018  3:30 PM  
Office Visit with Santy Guallpa MD  
[place] [id number] [facility]) Appt Note: 3 month f/u HTN and DM and chol/med refill  
 [address]  
  
    
 7/16/2018  3:00 PM  
New Patient with Tanisha Webb MD  
[address] [facility], [address] [facility] [address] [place] [address]) Appt Note: Degeneration of lumbar or lumbosacral intervertebral disc/ok per Alysia/ ref by Maricruz Frosts the patient to come 30 minutes prior to their appointment with their picture I.D, Insurance cards and a list of their medications & dosage to the [facility] location; multiple levels of spondolythesis at levels L4/5 and S1. Also with severe progressive worsening of lower back pain. See report: views of the lumbar spine including flexion and extension views reveal first degree anterolisth*TRUNCATED*  
 [address] [facility] [address]  
[phone]  
  
   
 [address] [facility] 100 [facility] [address] Upcoming Health Maintenance Date Due COLONOSCOPY 1/20/2017 FOOT EXAM Q1 7/6/2018 MICROALBUMIN Q1 7/6/2018 EYE EXAM RETINAL OR DILATED Q1 7/3/2018* Pneumococcal 19-64 Medium Risk (1 of 1 - PPSV23) 10/1/2018* DTaP/Tdap/Td series (1 - Tdap) 12/26/2018* ZOSTER VACCINE AGE 60> 4/4/2019* Influenza Age 5 to Adult 8/1/2018 HEMOGLOBIN A1C Q6M 10/4/2018 MEDICARE YEARLY EXAM 12/27/2018 BREAST CANCER SCRN MAMMOGRAM 1/17/2019 LIPID PANEL Q1 4/4/2019 PAP AKA CERVICAL CYTOLOGY 12/26/2020 *Topic was postponed. The date shown is not the original due date. Allergies as of 6/25/2018  Review Complete On: 6/25/2018 By: Jose Martin Lorenzo DO Severity Noted Reaction Type Reactions Zanaflex [Tizanidine]  04/18/2016   Side Effect Other (comments) Nausea and worsening headache Current Immunizations  Reviewed on 4/3/2018 Name Date Influenza Vaccine 9/30/2015  2:50 PM  
 Influenza Vaccine (Quad) PF 12/26/2017, 12/15/2016 Pneumococcal Conjugate (PCV-13) 12/15/2016 Not reviewed this visit You Were Diagnosed With   
  
 Codes Comments Degeneration of lumbar or lumbosacral intervertebral disc    -  Primary ICD-10-CM: M51.37 
ICD-9-CM: 722.52 Encounter for long-term (current) use of high-risk medication     ICD-10-CM: Z79.899 ICD-9-CM: V58.69 Bilateral occipital neuralgia     ICD-10-CM: M54.81 ICD-9-CM: 723.8 Trochanteric bursitis of both hips     ICD-10-CM: M70.61, M70.62 ICD-9-CM: 726.5 Constipation, unspecified constipation type     ICD-10-CM: K59.00 ICD-9-CM: 564.00 Sacroiliitis, not elsewhere classified ([facility] [address]     ICD-10-CM: M46.1 ICD-9-CM: 720.2 Vitals BP Pulse Temp Resp Height(growth percentile) Weight(growth percentile) 144/77 (BP 1 Location: Left arm, BP Patient Position: Sitting) (!) 106 99.2 °F (37.3 °C) (Oral) 14 5' 1\" (1.549 m) 164 lb (74.4 kg) BMI OB Status Smoking Status 30.99 kg/m2 Hysterectomy Never Smoker Vitals History BMI and BSA Data Body Mass Index Body Surface Area 30.99 kg/m 2 1.79 m 2 Preferred Pharmacy Pharmacy Name Phone [address], [address] [place] [address]  [phone] Your Updated Medication List  
  
   
This list is accurate as of 6/25/18  3:32 PM.  Always use your most recent med list.  
  
  
  
  
 atenolol 25 mg tablet Commonly known as:  TENORMIN Take 1 Tab by mouth daily. Indications: hypertension  
  
 cyclobenzaprine 10 mg tablet Commonly known as:  FLEXERIL Take 1 Tab by mouth three (3) times daily as needed for Muscle Spasm(s) for up to 30 days. Indications: Muscle Spasm  
  
 docusate sodium 100 mg capsule Commonly known as:  Elizabeth Second Take 1 Cap by mouth two (2) times daily as needed for Constipation for up to 90 days. fluorometholone 0.1 % ophthalmic suspension Commonly known as: [address] [facility] SHAKE LQ AND INT 1 GTT IN OU BID  
  
 lidocaine 5 % Commonly known as:  LIDODERM  
1 Patch by TransDERmal route every twelve (12) hours for 30 days. Apply patch to the affected area for 12 hours a day and remove for 12 hours a day. Indications: pain LORazepam 1 mg tablet Commonly known as:  ATIVAN  
TAKE 1 TABLET BY MOUTH TWICE DAILY AS NEEDED FOR ANXIETY  
  
 metFORMIN  mg tablet Commonly known as:  GLUCOPHAGE XR Take 1 Tab by mouth daily. Indications: type 2 diabetes mellitus * methadone 10 mg tablet Commonly known as:  DOLOPHINE Take 30mg PO in am. Take 20mg PO at midday. Take 20mg PO in the evenings for up to 30 days. Max Daily Amount: 70 mg. Indications: Chronic Pain, Severe Pain * methadone 10 mg tablet Commonly known as:  DOLOPHINE Take 1 Tab by mouth three (3) times daily for 30 days. Max Daily Amount: 30 mg. Indications: Chronic Pain Start taking on:  8/8/2018  
  
 methylphenidate HCl 10 mg tablet Commonly known as:  RITALIN Take 1 Tab by mouth three (3) times daily. NexIUM 40 mg capsule Generic drug:  esomeprazole TK 1 C PO QD AT 3PM  
  
 OTHER  
DDS Lumbar Traction Belt Use as directed  
  
 promethazine 25 mg tablet Commonly known as:  PHENERGAN Take 1 Tab by mouth every six (6) hours as needed for Nausea. Indications: nausea  
  
 senna-docusate 8.6-50 mg per tablet Commonly known as:  PERICOLACE  
1-2 tabs daily as needed for constipation  
  
 simvastatin 40 mg tablet Commonly known as:  ZOCOR  
TAKE 1 TABLET BY MOUTH EVERY NIGHT AT BEDTIME  Indications: hypercholesterolemia  
  
 topiramate 50 mg tablet Commonly known as:  TOPAMAX Take 3 Tabs by mouth two (2) times a day. Indications: MIGRAINE PREVENTION, seizure XIIDRA 5 % Dpet Generic drug:  lifitegrast  
  
 * Notice: This list has 2 medication(s) that are the same as other medications prescribed for you. Read the directions carefully, and ask your doctor or other care provider to review them with you. Prescriptions Printed Refills  
 methadone (DOLOPHINE) 10 mg tablet 0 Starting on: 2018 Sig: Take 1 Tab by mouth three (3) times daily for 30 days. Max Daily Amount: 30 mg. Indications: Chronic Pain Class: Print Route: Oral  
 lidocaine (LIDODERM) 5 % 1 Si Patch by TransDERmal route every twelve (12) hours for 30 days. Apply patch to the affected area for 12 hours a day and remove for 12 hours a day. Indications: pain  
 Class: Print Route: TransDERmal  
  
Prescriptions Sent to Pharmacy Refills  
 cyclobenzaprine (FLEXERIL) 10 mg tablet 2 Sig: Take 1 Tab by mouth three (3) times daily as needed for Muscle Spasm(s) for up to 30 days. Indications: Muscle Spasm Class: Normal  
 Pharmacy: [facility] [address] #: [phone] Route: Oral  
  
We Performed the Following AMB POC DRUG SCREEN () [ [facility]] DRUG SCREEN [[id number] Custom] Follow-up Instructions Return in about 4 weeks (around 7/23/2018) for 60 min. Introducing [facility]! [employer] introduces [employer] patient portal. Now you can access parts of your medical record, email your doctor's office, and request medication refills online. 1. In your internet browser, go to [email or web address] 2. Click on the First Time User? Click Here link in the Sign In box. You will see the New Member Sign Up page. 3. Enter your [employer] Access Code exactly as it appears below. You will not need to use this code after youve completed the sign-up process. If you do not sign up before the expiration date, you must request a new code. · [employer] Access Code: [email or web address] Expires: 8/2/2018  1:00 PM 
 
4. Enter the last four digits of your Social Security Number (xxxx) and Date of Birth (mm/dd/yyyy) as indicated and click Submit. You will be taken to the next sign-up page. 5. Create a [employer]fish ID. This will be your [employer] login ID and cannot be changed, so think of one that is secure and easy to remember. 6. Create a [employer] password. You can change your password at any time. 7. Enter your Password Reset Question and Answer. This can be used at a later time if you forget your password. 8. Enter your e-mail address. You will receive e-mail notification when new information is available in [address] 9. Click Sign Up. You can now view and download portions of your medical record. 10. Click the Download Summary menu link to download a portable copy of your medical information. If you have questions, please visit the Frequently Asked Questions section of the [employer] website. Remember, [employer] is NOT to be used for urgent needs. For medical emergencies, dial 911. Now available from your iPhone and Android! Please provide this summary of care documentation to your next provider. Your primary care clinician is listed as [facility]. If you have any questions after today's visit, please call [phone].

## 2018-06-26 VITALS
HEART RATE: 110 BPM | WEIGHT: 160 LBS | DIASTOLIC BLOOD PRESSURE: 87 MMHG | RESPIRATION RATE: 16 BRPM | SYSTOLIC BLOOD PRESSURE: 145 MMHG | TEMPERATURE: 98.9 F | BODY MASS INDEX: 30.21 KG/M2 | HEIGHT: 61 IN

## 2018-06-26 PROBLEM — G43.919 INTRACTABLE MIGRAINE WITHOUT STATUS MIGRAINOSUS: Status: ACTIVE | Noted: 2018-06-26

## 2018-06-26 PROBLEM — G89.4 CHRONIC PAIN SYNDROME: Status: ACTIVE | Noted: 2018-06-26

## 2018-06-26 PROBLEM — M54.81 BILATERAL OCCIPITAL NEURALGIA: Status: ACTIVE | Noted: 2018-06-26

## 2018-06-26 PROBLEM — F11.20 OPIOID USE DISORDER, MODERATE, DEPENDENCE (HCC): Status: ACTIVE | Noted: 2018-06-26

## 2018-06-26 PROBLEM — R29.3 ABNORMAL POSTURE: Status: ACTIVE | Noted: 2018-06-26

## 2018-06-26 PROBLEM — Z79.899 ENCOUNTER FOR LONG-TERM (CURRENT) USE OF HIGH-RISK MEDICATION: Status: ACTIVE | Noted: 2018-06-26

## 2018-06-26 PROBLEM — M70.61 TROCHANTERIC BURSITIS OF BOTH HIPS: Status: ACTIVE | Noted: 2018-06-26

## 2018-06-26 PROBLEM — M70.62 TROCHANTERIC BURSITIS OF BOTH HIPS: Status: ACTIVE | Noted: 2018-06-26

## 2018-06-26 RX ORDER — METHADONE HYDROCHLORIDE 10 MG/1
TABLET ORAL
Qty: 210 TAB | Refills: 0
Start: 2018-06-08 | End: 2018-07-25

## 2018-06-26 NOTE — PROGRESS NOTES
Nursing Notes    Patient presents to the office today in follow-up. Patient rates her pain at 6/10 on the numerical pain scale. Reviewed medications with counts as follows:    Rx Date filled Qty Dispensed Pill Count Last Dose Short   Methadone 10 mg  05/09/18 240 85 today no                                    POC UDS was not performed in office today. Any new labs or imaging since last appointment? YES. Pt states that she had the x-rays done of her back and hips as ordered. Results in [facility]     Have you been to an emergency room (ER) or urgent care clinic since your last visit? NO            Have you been hospitalized since your last visit? NO  If yes, where, when, and reason for visit? Have you seen or consulted any other health care providers outside of the [facility]  since your last visit? NO     If yes, where, when, and reason for visit? Pt to see neurology on 06/22/18    Ms. Jordon Jackson has a reminder for a \"due or due soon\" health maintenance. I have asked that she contact her primary care provider for follow-up on this health maintenance.

## 2018-06-26 NOTE — PROGRESS NOTES
Referral date November 2013, From pain management center in [place]  for chronic widespread pain and headaches. HPI:  Deepak Linares is a 59 y.o. female here for f/u visit for ongoing evaluation of chronic widespread pain and headaches. Pt was last seen here on 05/04/2018. Pt states she \"hurts like hell\" and has conitnued back pain, \"more headaches\" and she is \"unable to function\". Pain is located lumbosacral belt line and into the right gluteal region, bilateral knees, cervical spine. The pain is described as constant aching and burning . Pain at its best is 5/10. Pain at its worse is 8/10.      Social History     Social History    Marital status:      Spouse name: N/A    Number of children: N/A    Years of education: N/A     Occupational History    retired, , ,       Social History Main Topics    Smoking status: Never Smoker    Smokeless tobacco: Never Used    Alcohol use No    Drug use: No    Sexual activity: Not Currently     Other Topics Concern    Not on file     Social History Narrative     Family History   Problem Relation Age of Onset    Alcohol abuse Father     Diabetes Sister     Migraines Daughter     Diabetes Maternal Aunt     Cancer Maternal Aunt     Diabetes Maternal Uncle     Diabetes Paternal Aunt     Diabetes Paternal Uncle     Heart Attack Maternal Grandmother      Allergies   Allergen Reactions    Zanaflex [Tizanidine] Other (comments)     Nausea and worsening headache     Past Medical History:   Diagnosis Date    Anxiety     Cataract 6/19/15    bilat    Chronic sialoadenitis     Degenerative disc disease     Depression     Diabetes ([facility] [address] 2012    Dry eye     Dyslipidemia     Fatigue     uses ritalin for this    Fibromyalgia     GERD (gastroesophageal reflux disease)     Headache(784.0)     daily, all her life    Hypertension     OCD (obsessive compulsive disorder)     PTSD (post-traumatic stress disorder)     Seizure disorder ([facility] [address]     Thyroid dysfunction     TMJ (temporomandibular joint disorder)     right    Viral encephalitis     unknown type from mosquito     Past Surgical History:   Procedure Laterality Date    HX  SECTION      X 2    HX COLONOSCOPY  2012    ? f/u    HX PARTIAL HYSTERECTOMY       Current Outpatient Prescriptions on File Prior to Visit   Medication Sig    simvastatin (ZOCOR) 40 mg tablet TAKE 1 TABLET BY MOUTH EVERY NIGHT AT BEDTIME    metFORMIN ER (GLUCOPHAGE XR) 750 mg tablet Take 1 Tab by mouth daily. Indications: type 2 diabetes mellitus    docusate sodium (COLACE) 100 mg capsule Take 1 Cap by mouth two (2) times daily as needed for Constipation for up to 90 days.  cyclobenzaprine (FLEXERIL) 10 mg tablet TAKE 1 TABLET BY MOUTH TWICE DAILY    promethazine (PHENERGAN) 25 mg tablet Take 1 Tab by mouth every six (6) hours as needed for Nausea. Indications: nausea    atenolol (TENORMIN) 25 mg tablet Take 1 Tab by mouth daily. Indications: hypertension    topiramate (TOPAMAX) 50 mg tablet Take 3 Tabs by mouth two (2) times a day. Indications: MIGRAINE PREVENTION, seizure    simvastatin (ZOCOR) 40 mg tablet TAKE 1 TABLET BY MOUTH EVERY NIGHT AT BEDTIME  Indications: hypercholesterolemia    LORazepam (ATIVAN) 1 mg tablet TAKE 1 TABLET BY MOUTH TWICE DAILY AS NEEDED FOR ANXIETY    fluorometholone (FML) 0.1 % ophthalmic suspension SHAKE LQ AND INT 1 GTT IN OU BID    methylphenidate (RITALIN) 10 mg tablet Take 1 Tab by mouth three (3) times daily. (Patient taking differently: Take 10 mg by mouth three (3) times daily as needed.)    NEXIUM 40 mg capsule TK 1 C PO QD AT 3PM    OTHER DDS Lumbar Traction Belt  Use as directed    senna-docusate (PERICOLACE) 8.6-50 mg per tablet 1-2 tabs daily as needed for constipation    XIIDRA 5 % dpet      No current facility-administered medications on file prior to visit.          Review of Systems   Gastrointestinal: Negative for constipation. Musculoskeletal: Positive for back pain and neck pain. Neurological: Positive for headaches (seeing neurologist ). Opioid specific risk: Concurrent benzodiazepine use, depression, family history of suicide, family history of substance use disorder, PTSD, depression, did not  the Narcan nasal spray which was previously ordered, diabetes, anxiety. Seizure disorder . Opioid specific history:  chronic opioid therapy continuously without breaks for 21 years. Vitals:    05/31/18 1347   BP: 145/87   Pulse: (!) 110   Resp: 16   Temp: 98.9 °F (37.2 °C)   TempSrc: Oral   Weight: 72.6 kg (160 lb)   Height: 5' 1\" (1.549 m)   PainSc:   6   PainLoc: Back        Imaging:     XR SPINE LUMB COMP W BEND on 5/25/2018  \"\"IMPRESSION: 7 views of the lumbar spine including flexion and extension views reveal first degree anterolisthesis of L5 4 on L5 which increases from 3.4 mm on extension to 5 mm on flexion. There is first degree anterolisthesis of L5 on S1 that increases from 3.4 mm on extension to 5.7 mm on flexion. There is chronic discogenic disease at L to 3 with disc space narrowing sclerosis and small anterior osteophytes. Small anterior aspect of present at L3-4. Vertebral body  stature and alignment are otherwise maintained throughout. No facet arthropathy. Stool in the colon is indicative of constipation. \"\"     XR HIPS BI W AP PELV on 5/25/2018  \"\"IMPRESSION: 2 views of the bony pelvis and right hip joint reveal they are intact. No fracture or dislocation. Stool in the colon is indicative of constipation. \"\"     EKG: re-ordered today as patient did not get done    PE:  AFVSS with noted tachycardia, no acute distress, normal body habitus. A&OXs 3. Normocephalic, atraumatic. Conjugate gaze, clear sclerae. Somewhat flattened affect, intermittent generalized tremor. Gait is within functional limits with decreased evelyne. Balance is within functional limits.     TTP at bilat suboccipitals and bilat cervical paraspinals. Decreased segmental motion at multiple cervical levels bilat. Active TP at bilat upper trap regions. Patient is highly reactive to physical exam With tenderness to palpation throughout    Calculated MEQ - 240 and reduced to 210  Naloxone rescue - Yes  Prophylactic bowel program - Yes  Date of last OCA 01/29/2018  Last UDS 01/29/2018, consistent   date checked Today, findings consistent    Primary Care Physician  Rosalind Elizondo [address] [facility]  [phone]    Today     Last Visit      PRINCE - 44%    32%  PGIC - did not complete  5 and 4  COMM - 2    6    PHQ -- . PHQ over the last two weeks 5/31/2018   Little interest or pleasure in doing things Not at all   Feeling down, depressed or hopeless Not at all   Total Score PHQ 2 0         Assessment/Plan:     ICD-10-CM ICD-9-CM    1. Encounter for long-term (current) use of high-risk medication Z79.899 V58.69 EKG, 12 LEAD, INITIAL      DISCONTINUED: methadone (DOLOPHINE) 10 mg tablet   2. Intractable migraine without status migrainosus, unspecified migraine type G43.919 346.91    3. Bilateral occipital neuralgia M54.81 723.8    4. Abnormal posture R29.3 781.92    5. Chronic pain syndrome G89.4 338.4 methadone (DOLOPHINE) 10 mg tablet   6. Opioid use disorder, moderate, dependence (HCC) F11.20 304.00    7. Trochanteric bursitis of both hips M70.61 726.5     M70.62     8. Degeneration of lumbar or lumbosacral intervertebral disc M51.37 722.52 REFERRAL TO SPINE SURGERY        Patient continues to have multiple risk factors regarding long-term opioid treatment for chronic pain and has been concurrently treated with opioids consistently for 21 years. She is currently receiving methadone 10 mg tablets to take 30 mg in the am, 20 mg at midday and 30 mg at night as needed for pain.      Will continue her planned 10% per month reduction in morphine marcella equivalents until she is off opioids with regard to patients safety and tolerance. Today she received methadone 10mg tablets to take 30mg in the am, 20mg at midday and 20mg at night as needed for pain. Patient was educated on signs and symptoms of withdrawal and instructed to call the clinic if she has difficulty maintaining the decreased dose of opioids    EKG reordered and EKG must be completed by next visit or will accelerate the wean due to safety. Patient has appointment with Neurology 06/22/2018 to resume treatments for continued worsening migraines. Lumbar X-rays reviewed and patient referred to Ortho/spine Dr Prudencio Segovia to evaluate and treat. Will continue to consider treatments requested last visit to include PT, Behavioral health, Pain psychology. Continue evaluation of bilateral greater trochanteric bursitis, limb length differences with a short right leg, bilateral sacroiliitis, and multiple active trigger points in bilateral lumbosacral region. GOALS:  To establish complementary and integrative plan of care to address chronic pain issues while minimizing pharmaceuticals to maximize patient's function improve quality of life. Follow up ongoing assessment and ongoing development of integrative and comprehensive plan of care for chronic pain. Education:  Patient again educated on the importance of strict compliance with the opioid care agreement while on opioid therapy. Patient also again educated that they should avoid driving while on chronic opioid therapy. Also advised to avoid alcohol and to avoid benzodiazepines while on opioid therapy. Patient Homework: To continue to seek out addiction treatment center in her network      F/u:. Follow-up Disposition:  Return in about 30 days (around 6/30/2018) for 30 mins.

## 2018-06-26 NOTE — PATIENT INSTRUCTIONS
Learning About Sleeping Well  What does sleeping well mean? Sleeping well means getting enough sleep. How much sleep is enough varies among people. The number of hours you sleep is not as important as how you feel when you wake up. If you do not feel refreshed, you probably need more sleep. Another sign of not getting enough sleep is feeling tired during the day. The average total nightly sleep time is 7½ to 8 hours. Healthy adults may need a little more or a little less than this. Why is getting enough sleep important? Getting enough quality sleep is a basic part of good health. When your sleep suffers, your mood and your thoughts can suffer too. You may find yourself feeling more grumpy or stressed. Not getting enough sleep also can lead to serious problems, including injury, accidents, anxiety, and depression. What might cause poor sleeping? Many things can cause sleep problems, including:  · Stress. Stress can be caused by fear about a single event, such as giving a speech. Or you may have ongoing stress, such as worry about work or school. · Depression, anxiety, and other mental or emotional conditions. · Changes in your sleep habits or surroundings. This includes changes that happen where you sleep, such as noise, light, or sleeping in a different bed. It also includes changes in your sleep pattern, such as having jet lag or working a late shift. · Health problems, such as pain, breathing problems, and restless legs syndrome. · Lack of regular exercise. How can you help yourself? Here are some tips that may help you sleep more soundly and wake up feeling more refreshed. Your sleeping area  · Use your bedroom only for sleeping and sex. A bit of light reading may help you fall asleep. But if it doesn't, do your reading elsewhere in the house. Don't watch TV in bed. · Be sure your bed is big enough to stretch out comfortably, especially if you have a sleep partner.   · Keep your bedroom quiet, dark, and cool. Use curtains, blinds, or a sleep mask to block out light. To block out noise, use earplugs, soothing music, or a \"white noise\" machine. Your evening and bedtime routine  · Create a relaxing bedtime routine. You might want to take a warm shower or bath, listen to soothing music, or drink a cup of noncaffeinated tea. · Go to bed at the same time every night. And get up at the same time every morning, even if you feel tired. What to avoid  · Limit caffeine (coffee, tea, caffeinated sodas) during the day, and don't have any for at least 4 to 6 hours before bedtime. · Don't drink alcohol before bedtime. Alcohol can cause you to wake up more often during the night. · Don't smoke or use tobacco, especially in the evening. Nicotine can keep you awake. · Don't take naps during the day, especially close to bedtime. · Don't lie in bed awake for too long. If you can't fall asleep, or if you wake up in the middle of the night and can't get back to sleep within 15 minutes or so, get out of bed and go to another room until you feel sleepy. · Don't take medicine right before bed that may keep you awake or make you feel hyper or energized. Your doctor can tell you if your medicine may do this and if you can take it earlier in the day. If you can't sleep  · Imagine yourself in a peaceful, pleasant scene. Focus on the details and feelings of being in a place that is relaxing. · Get up and do a quiet or boring activity until you feel sleepy. · Don't drink any liquids after 6 p.m. if you wake up often because you have to go to the bathroom. Where can you learn more? Go to http://mary-consuelo.info/. Enter J235 in the search box to learn more about \"Learning About Sleeping Well. \"  Current as of: May 12, 2017  Content Version: 11.4  © 8406-1167 Healthwise, [employer].  Care instructions adapted under license by [employer] (which disclaims liability or warranty for this information). If you have questions about a medical condition or this instruction, always ask your healthcare professional. [address] any warranty or liability for your use of this information. Safe Use of Opioid Pain Medicine: Care Instructions  Your Care Instructions  Pain is your body's way of warning you that something is wrong. Pain feels different for everybody. Only you can describe your pain. A doctor can suggest or prescribe many types of medicines for pain. These range from over-the-counter medicines like acetaminophen (Tylenol) to powerful medicines called opioids. Examples of opioids are fentanyl, hydrocodone, morphine, and oxycodone. Heroin is an illegal opioid  Opioids are strong medicines. They can help you manage pain when you use them the right way. But if you misuse them, they can cause serious harm and even death. For these reasons, doctors are very careful about how they prescribe opioids. If you decide to take opioids, here are some things to remember. · Keep your doctor informed. You can get addicted to opioids. The risk is higher if you have a history of substance use. Your doctor will monitor you closely for signs of misuse and addiction and to figure out when you no longer need to take opioids. · Make a treatment plan. The goal of your plan is to be able to function and do the things you need to do, even if you still have some pain. You might be able to manage your pain with other non-opioid options like physical therapy, relaxation, or over-the-counter pain medicines. · Be aware of the side effects. Opioids can cause serious side effects, such as constipation, dry mouth, and nausea. And over time, you may need a higher dose to get pain relief. This is called tolerance. Your body also gets used to opioids. This is called physical dependence. If you suddenly stop taking them, you may have withdrawal symptoms.   The doctor carefully considered what pain medicine is right for you. You may not have received opioids if your doctor was concerned about drug interactions or your safety, or if he or she had other concerns. It is best to have one doctor or clinic treat your pain. This way you will get the pain medicine that will help you the most. And a doctor will be able to watch for any problems that the medicine might cause. The doctor has checked you carefully, but problems can develop later. If you notice any problems or new symptoms,  get medical treatment right away. Follow-up care is a key part of your treatment and safety. Be sure to make and go to all appointments, and call your doctor if you are having problems. It's also a good idea to know your test results and keep a list of the medicines you take. How can you care for yourself at home? · If you need to take opioids to manage your pain, remember these safety tips. ¨ Follow directions carefully. It's easy to misuse opioids if you take a dose other than what's prescribed by your doctor. This can lead to overdose and even death. Even sharing them with someone they weren't meant for is misuse. ¨ Be cautious. Opioids may affect your judgment and decision making. Do not drive or operate machinery until you can think clearly. Talk with your doctor about when it is safe to drive. ¨ Reduce the risk of drug interactions. Opioids can be dangerous if you take them with alcohol or with certain drugs like sleeping pills and muscle relaxers. Make sure your doctor knows about all the other medicines you take, including over-the-counter medicines. Don't start any new medicines before you talk to your doctor or pharmacist.  [place] Keep others safe. Store opioids in a safe and secure place. Make sure that pets, children, friends, and family can't get to them. When you're done using opioids, make sure to properly dispose of them.  You can either use a community drug take-back program or your drugstore's mail-back program. If one of these programs isn't available, you can flush opioid skin patches and unused opioid pills down the toilet. ¨ Reduce the risk of overdose. Misuse of opioids can be very dangerous. Protect yourself by asking your doctor about a naloxone rescue kit. It can help you-and even save your life-if you take too much of an opioid. · Try other ways to reduce pain. ¨ Relax, and reduce stress. Relaxation techniques such as deep breathing or meditation can help. ¨ Keep moving. Gentle, daily exercise can help reduce pain over the long run. Try low- or no-impact exercises such as walking, swimming, and stationary biking. Do stretches to stay flexible. ¨ Try heat, cold packs, and massage. ¨ Get enough sleep. Pain can make you tired and drain your energy. Talk with your doctor if you have trouble sleeping because of pain. ¨ Think positive. Your thoughts can affect your pain level. Do things that you enjoy to distract yourself when you have pain instead of focusing on the pain. See a movie, read a book, listen to music, or spend time with a friend. · If you are not taking a prescription pain medicine, ask your doctor if you can take an over-the-counter medicine. When should you call for help? Call your doctor now or seek immediate medical care if:  ? · You have a new kind of pain. ? · You have new symptoms, such as a fever or rash, along with the pain. ? Watch closely for changes in your health, and be sure to contact your doctor if:  ? · You think you might be using too much pain medicine, and you need help to use less or stop. ? · Your pain gets worse. ? · You would like a referral to a doctor or clinic that specializes in pain management. Where can you learn more? Go to http://mary-consuelo.info/. Enter R108 in the search box to learn more about \"Safe Use of Opioid Pain Medicine: Care Instructions. \"  Current as of: October 14, 2016  Content Version: 11.4  © 8436-1928 Healthwise [employer]. Care instructions adapted under license by [employer] (which disclaims liability or warranty for this information). If you have questions about a medical condition or this instruction, always ask your healthcare professional. [address] any warranty or liability for your use of this information.

## 2018-07-02 ENCOUNTER — DOCUMENTATION ONLY (OUTPATIENT)
Dept: PAIN MANAGEMENT | Age: 58
End: 2018-07-02

## 2018-07-02 NOTE — PROGRESS NOTES
The pt's order for a back brace and other necessary pt information were faxed over to [facility]. A fax confirmation was received and they will contact the pt.

## 2018-07-09 ENCOUNTER — OFFICE VISIT (OUTPATIENT)
Dept: INTERNAL MEDICINE CLINIC | Age: 58
End: 2018-07-09

## 2018-07-09 ENCOUNTER — HOSPITAL ENCOUNTER (OUTPATIENT)
Dept: LAB | Age: 58
Discharge: HOME | End: 2018-07-09
Payer: MEDICARE

## 2018-07-09 VITALS
OXYGEN SATURATION: 97 % | WEIGHT: 165 LBS | SYSTOLIC BLOOD PRESSURE: 135 MMHG | HEIGHT: 61 IN | RESPIRATION RATE: 20 BRPM | TEMPERATURE: 98.6 F | DIASTOLIC BLOOD PRESSURE: 85 MMHG | BODY MASS INDEX: 31.15 KG/M2 | HEART RATE: 114 BPM

## 2018-07-09 DIAGNOSIS — M46.1 SACROILIITIS, NOT ELSEWHERE CLASSIFIED (HCC): ICD-10-CM

## 2018-07-09 DIAGNOSIS — E78.5 DYSLIPIDEMIA: Chronic | ICD-10-CM

## 2018-07-09 DIAGNOSIS — Z76.0 MEDICATION REFILL: ICD-10-CM

## 2018-07-09 DIAGNOSIS — E11.9 TYPE 2 DIABETES MELLITUS WITHOUT COMPLICATION, WITHOUT LONG-TERM CURRENT USE OF INSULIN (HCC): Primary | Chronic | ICD-10-CM

## 2018-07-09 DIAGNOSIS — I10 ESSENTIAL HYPERTENSION: Chronic | ICD-10-CM

## 2018-07-09 DIAGNOSIS — E11.9 TYPE 2 DIABETES MELLITUS WITHOUT COMPLICATION, WITHOUT LONG-TERM CURRENT USE OF INSULIN (HCC): Chronic | ICD-10-CM

## 2018-07-09 PROBLEM — E11.21 TYPE 2 DIABETES WITH NEPHROPATHY (HCC): Status: ACTIVE | Noted: 2018-07-09

## 2018-07-09 LAB
ALBUMIN SERPL-MCNC: 4.1 G/DL (ref 3.4–5)
ALBUMIN/GLOB SERPL: 1.4 {RATIO} (ref 0.8–1.7)
ALP SERPL-CCNC: 66 U/L (ref 45–117)
ALT SERPL-CCNC: 29 U/L (ref 13–56)
ANION GAP SERPL CALC-SCNC: 10 MMOL/L (ref 3–18)
AST SERPL-CCNC: 17 U/L (ref 15–37)
BILIRUB SERPL-MCNC: 0.2 MG/DL (ref 0.2–1)
BUN SERPL-MCNC: 12 MG/DL (ref 7–18)
BUN/CREAT SERPL: 10 (ref 12–20)
CALCIUM SERPL-MCNC: 8.9 MG/DL (ref 8.5–10.1)
CHLORIDE SERPL-SCNC: 106 MMOL/L (ref 100–108)
CHOLEST SERPL-MCNC: 119 MG/DL
CO2 SERPL-SCNC: 25 MMOL/L (ref 21–32)
CREAT SERPL-MCNC: 1.15 MG/DL (ref 0.6–1.3)
GLOBULIN SER CALC-MCNC: 3 G/DL (ref 2–4)
GLUCOSE SERPL-MCNC: 124 MG/DL (ref 74–99)
HBA1C MFR BLD: 7.6 % (ref 4.2–5.6)
HDLC SERPL-MCNC: 44 MG/DL (ref 40–60)
HDLC SERPL: 2.7 {RATIO} (ref 0–5)
LDLC SERPL CALC-MCNC: 43.8 MG/DL (ref 0–100)
LIPID PROFILE,FLP: ABNORMAL
POTASSIUM SERPL-SCNC: 4.3 MMOL/L (ref 3.5–5.5)
PROT SERPL-MCNC: 7.1 G/DL (ref 6.4–8.2)
SODIUM SERPL-SCNC: 141 MMOL/L (ref 136–145)
TRIGL SERPL-MCNC: 156 MG/DL (ref ?–150)
VLDLC SERPL CALC-MCNC: 31.2 MG/DL

## 2018-07-09 PROCEDURE — 80053 COMPREHEN METABOLIC PANEL: CPT | Performed by: INTERNAL MEDICINE

## 2018-07-09 PROCEDURE — 83036 HEMOGLOBIN GLYCOSYLATED A1C: CPT | Performed by: INTERNAL MEDICINE

## 2018-07-09 PROCEDURE — 82043 UR ALBUMIN QUANTITATIVE: CPT | Performed by: INTERNAL MEDICINE

## 2018-07-09 PROCEDURE — 36415 COLL VENOUS BLD VENIPUNCTURE: CPT | Performed by: INTERNAL MEDICINE

## 2018-07-09 PROCEDURE — 80061 LIPID PANEL: CPT | Performed by: INTERNAL MEDICINE

## 2018-07-09 RX ORDER — TOPIRAMATE 50 MG/1
150 TABLET, FILM COATED ORAL 2 TIMES DAILY
Qty: 540 TAB | Refills: 4 | Status: SHIPPED | OUTPATIENT
Start: 2018-07-09 | End: 2018-11-26 | Stop reason: SDUPTHER

## 2018-07-09 RX ORDER — LIDOCAINE 50 MG/G
PATCH TOPICAL
Qty: 30 PATCH | Refills: 1 | Status: SHIPPED | OUTPATIENT
Start: 2018-07-09 | End: 2018-11-26 | Stop reason: SDUPTHER

## 2018-07-09 RX ORDER — SIMVASTATIN 40 MG/1
TABLET, FILM COATED ORAL
Qty: 90 TAB | Refills: 5 | Status: SHIPPED | OUTPATIENT
Start: 2018-07-09 | End: 2019-06-13 | Stop reason: SDUPTHER

## 2018-07-09 NOTE — PROGRESS NOTES
ROOM # 5    Allyson Bill presents today for   Chief Complaint   Patient presents with    Hypertension     3 month follow up     Diabetes    Cholesterol Problem    Pain (Chronic)       Allyson Bill preferred language for health care discussion is english/other. Is someone accompanying this pt? no    Is the patient using any DME equipment during OV? no    Depression Screening:  PHQ over the last two weeks 6/25/2018 5/31/2018 5/4/2018 4/4/2018 1/29/2018 12/26/2017 7/24/2017   Little interest or pleasure in doing things Not at all Not at all Not at all Not at all Not at all Several days Not at all   Feeling down, depressed or hopeless Several days Not at all Not at all Not at all Not at all Several days Not at all   Total Score PHQ 2 1 0 0 0 0 2 0       Learning Assessment:  Learning Assessment 1/29/2018 10/25/2017 4/1/2015 2/6/2015 1/27/2014   PRIMARY LEARNER Patient Patient Patient Patient Patient   HIGHEST LEVEL OF EDUCATION - PRIMARY LEARNER  DID NOT GRADUATE HIGH SCHOOL DID NOT GRADUATE HIGH SCHOOL - - -   BARRIERS PRIMARY LEARNER NONE NONE - - -   CO-LEARNER CAREGIVER No No - - -   [address]   LEARNER PREFERENCE PRIMARY DEMONSTRATION DEMONSTRATION LISTENING LISTENING DEMONSTRATION     - - - READING -   ANSWERED BY self self patient patient patient   RELATIONSHIP SELF SELF SELF SELF SELF       Abuse Screening:  Abuse Screening Questionnaire 1/29/2018 7/24/2017 4/13/2015   Do you ever feel afraid of your partner? N N N   Are you in a relationship with someone who physically or mentally threatens you? N N N   Is it safe for you to go home? Олег Hogan       Fall Risk  Fall Risk Assessment, last 12 mths 4/4/2018 1/29/2018 7/24/2017 1/27/2014   Able to walk? Yes Yes Yes Yes   Fall in past 12 months? No No No No       Health Maintenance reviewed and discussed per provider.  Yes    Allyson Bill is due for   Health Maintenance Due   Topic Date Due    COLONOSCOPY  01/20/2017    EYE EXAM RETINAL OR DILATED Q1  03/14/2018    FOOT EXAM Q1  07/06/2018    MICROALBUMIN Q1  07/06/2018     Please order/place referral if appropriate. Advance Directive:  1. Do you have an advance directive in place? Patient Reply: no    2. If not, would you like material regarding how to put one in place? Patient Reply: no    Coordination of Care:  1. Have you been to the ER, urgent care clinic since your last visit? Hospitalized since your last visit? no    2. Have you seen or consulted any other health care providers outside of the [address] [place] [address] since your last visit? Include any pap smears or colon screening.  Yes

## 2018-07-09 NOTE — MR AVS SNAPSHOT
[facility] 
 
 
 [address] [facility] [id number] 
[phone] Patient: Fallon Pearson MRN: [id number] Visit Information Date & Time Provider Department Dept. Phone Encounter #  
 7/9/2018  3:30 PM Melissa SchaferShopo [phone] [id number] Follow-up Instructions Return in about 4 months (around 11/9/2018) for HTN, DM, cholesterol. Your Appointments 7/16/2018  3:00 PM  
New Patient with Deysi Gonzales MD  
[address] [facility], [address] [facility] [address] [facility]) Appt Note: Degeneration of lumbar or lumbosacral intervertebral disc/ok per Alysia/ ref by Willian Youngbloodch the patient to come 30 minutes prior to their appointment with their picture I.D, Insurance cards and a list of their medications & dosage to the [facility] location; multiple levels of spondolythesis at levels L4/5 and S1. Also with severe progressive worsening of lower back pain. See report: views of the lumbar spine including flexion and extension views reveal first degree anterolisth*TRUNCATED*  
 [address] [facility] [address]  
[phone]  
  
   
 [address] [facility] [address] Upcoming Health Maintenance Date Due COLONOSCOPY 1/20/2017 EYE EXAM RETINAL OR DILATED Q1 3/14/2018 FOOT EXAM Q1 7/6/2018 MICROALBUMIN Q1 7/6/2018 Pneumococcal 19-64 Medium Risk (1 of 1 - PPSV23) 10/1/2018* DTaP/Tdap/Td series (1 - Tdap) 12/26/2018* ZOSTER VACCINE AGE 60> 4/4/2019* Influenza Age 5 to Adult 8/1/2018 HEMOGLOBIN A1C Q6M 10/4/2018 MEDICARE YEARLY EXAM 12/27/2018 BREAST CANCER SCRN MAMMOGRAM 1/17/2019 LIPID PANEL Q1 4/4/2019 PAP AKA CERVICAL CYTOLOGY 12/26/2020 *Topic was postponed. The date shown is not the original due date.    
  
Allergies as of 7/9/2018  Review Complete On: 7/9/2018 By: Jameel Serrato, LPN  
  
 Severity Noted Reaction Type Reactions Zanaflex [Tizanidine]  04/18/2016   Side Effect Other (comments) Nausea and worsening headache Current Immunizations  Reviewed on 4/3/2018 Name Date Influenza Vaccine 9/30/2015  2:50 PM  
 Influenza Vaccine (Quad) PF 12/26/2017, 12/15/2016 Pneumococcal Conjugate (PCV-13) 12/15/2016 Not reviewed this visit You Were Diagnosed With   
  
 Codes Comments Type 2 diabetes mellitus without complication, without long-term current use of insulin (HCC)    -  Primary ICD-10-CM: E11.9 ICD-9-CM: 250.00 Essential hypertension     ICD-10-CM: I10 
ICD-9-CM: 401.9 Dyslipidemia     ICD-10-CM: E78.5 ICD-9-CM: 272.4 Sacroiliitis, not elsewhere classified ([facility]     ICD-10-CM: M46.1 ICD-9-CM: 720.2 Medication refill     ICD-10-CM: Z76.0 ICD-9-CM: V68.1 Vitals BP Pulse Temp Resp Height(growth percentile) Weight(growth percentile) 135/85 (BP 1 Location: Right arm, BP Patient Position: Sitting) (!) 114 98.6 °F (37 °C) (Oral) 20 5' 1\" (1.549 m) 165 lb (74.8 kg) SpO2 BMI OB Status Smoking Status 97% 31.18 kg/m2 Hysterectomy Never Smoker Vitals History BMI and BSA Data Body Mass Index Body Surface Area  
 31.18 kg/m 2 1.79 m 2 Preferred Pharmacy Pharmacy Name Phone [address] [facility], [address] [place] [address]  [phone] Your Updated Medication List  
  
   
This list is accurate as of 7/9/18  4:24 PM.  Always use your most recent med list.  
  
  
  
  
 atenolol 25 mg tablet Commonly known as:  TENORMIN Take 1 Tab by mouth daily. Indications: hypertension  
  
 cyclobenzaprine 10 mg tablet Commonly known as:  FLEXERIL Take 1 Tab by mouth three (3) times daily as needed for Muscle Spasm(s) for up to 30 days. Indications: Muscle Spasm  
  
 docusate sodium 100 mg capsule Commonly known as:  Jordyn Mora  
 Take 1 Cap by mouth two (2) times daily as needed for Constipation for up to 90 days. fluorometholone 0.1 % ophthalmic suspension Commonly known as: [address] [facility] SHAKE LQ AND INT 1 GTT IN OU BID  
  
 lidocaine 5 % Commonly known as:  Gogo Soto Apply one patch and leave on 12 hours then remove for 12 hours. Indications: pain LORazepam 1 mg tablet Commonly known as:  ATIVAN  
TAKE 1 TABLET BY MOUTH TWICE DAILY AS NEEDED FOR ANXIETY  
  
 metFORMIN  mg tablet Commonly known as:  GLUCOPHAGE XR Take 1 Tab by mouth daily. Indications: type 2 diabetes mellitus * methadone 10 mg tablet Commonly known as:  DOLOPHINE Take 30mg PO in am. Take 20mg PO at midday. Take 20mg PO in the evenings for up to 30 days. Max Daily Amount: 70 mg. Indications: Chronic Pain, Severe Pain * methadone 10 mg tablet Commonly known as:  DOLOPHINE Take 1 Tab by mouth three (3) times daily for 30 days. Max Daily Amount: 30 mg. Indications: Chronic Pain Start taking on:  8/8/2018  
  
 methylphenidate HCl 10 mg tablet Commonly known as:  RITALIN Take 1 Tab by mouth three (3) times daily. NexIUM 40 mg capsule Generic drug:  esomeprazole TK 1 C PO QD AT 3PM  
  
 OTHER  
DDS Lumbar Traction Belt Use as directed  
  
 promethazine 25 mg tablet Commonly known as:  PHENERGAN Take 1 Tab by mouth every six (6) hours as needed for Nausea. Indications: nausea  
  
 senna-docusate 8.6-50 mg per tablet Commonly known as:  PERICOLACE  
1-2 tabs daily as needed for constipation  
  
 simvastatin 40 mg tablet Commonly known as:  ZOCOR  
TAKE 1 TABLET BY MOUTH EVERY NIGHT AT BEDTIME  Indications: hypercholesterolemia  
  
 topiramate 50 mg tablet Commonly known as:  TOPAMAX Take 3 Tabs by mouth two (2) times a day. Indications: MIGRAINE PREVENTION, seizure XIIDRA 5 % Dpet Generic drug:  lifitegrast  
  
 * Notice:   This list has 2 medication(s) that are the same as other medications prescribed for you. Read the directions carefully, and ask your doctor or other care provider to review them with you. Prescriptions Sent to Pharmacy Refills  
 simvastatin (ZOCOR) 40 mg tablet 5 Sig: TAKE 1 TABLET BY MOUTH EVERY NIGHT AT BEDTIME  Indications: hypercholesterolemia Class: Normal  
 Pharmacy: [facility] [address] [place] [address]  Ph #: [phone]  
 topiramate (TOPAMAX) 50 mg tablet 4 Sig: Take 3 Tabs by mouth two (2) times a day. Indications: MIGRAINE PREVENTION, seizure Class: Normal  
 Pharmacy: [facility] [address] [place] [address]  Ph #: [phone] Route: Oral  
 lidocaine (LIDODERM) 5 % 1 Sig: Apply one patch and leave on 12 hours then remove for 12 hours. Indications: pain  
 Class: Normal  
 Pharmacy: [facility] [address]  Ph #: [phone] We Performed the Following  DIABETES FOOT EXAM [[facility]] Follow-up Instructions Return in about 4 months (around 11/9/2018) for HTN, DM, cholesterol. To-Do List   
 07/09/2018 Lab:  HEMOGLOBIN A1C W/O EAG   
  
 07/09/2018 Lab:  LIPID PANEL   
  
 07/09/2018 Lab:  METABOLIC PANEL, COMPREHENSIVE   
  
 07/09/2018 Lab:  MICROALBUMIN, UR, RAND W/ MICROALB/CREAT RATIO   
  
 07/19/2018  2:00 PM  
(Arrive by 1:30 PM) Appointment with [facility] at [facility] ([phone]) 1. Wash your hair the night before test.  Use only shampoo and water come to the lab with clean, dry hair. Do not use gels, mousse, hair spray or oils of any kind after washing your hair. Remove all extensions and or hair weaves. If not removed, we may not be able to do your study.        2.  Take all regular medication as scheduled, unless your doctor specifically tells you to stop. 3.  Continue to eat as usual.     4.  Bring a written list of all current medications including over the counter medications and any supplements you take. Please arrive 30 minutes prior to appaointment to register Introducing [facility] SERVICES! Ailyn Fairchild introduces [employer] patient portal. Now you can access parts of your medical record, email your doctor's office, and request medication refills online. 1. In your internet browser, go to [email or web address] 2. Click on the First Time User? Click Here link in the Sign In box. You will see the New Member Sign Up page. 3. Enter your [employer] Access Code exactly as it appears below. You will not need to use this code after youve completed the sign-up process. If you do not sign up before the expiration date, you must request a new code. · [employer] Access Code: [email or web address] Expires: 8/2/2018  1:00 PM 
 
4. Enter the last four digits of your Social Security Number (xxxx) and Date of Birth (mm/dd/yyyy) as indicated and click Submit. You will be taken to the next sign-up page. 5. Create a [employer] ID. This will be your [employer] login ID and cannot be changed, so think of one that is secure and easy to remember. 6. Create a [employer] password. You can change your password at any time. 7. Enter your Password Reset Question and Answer. This can be used at a later time if you forget your password. 8. Enter your e-mail address. You will receive e-mail notification when new information is available in [address] 9. Click Sign Up. You can now view and download portions of your medical record. 10. Click the Download Summary menu link to download a portable copy of your medical information. If you have questions, please visit the Frequently Asked Questions section of the [employer] website. Remember, [employer] is NOT to be used for urgent needs. For medical emergencies, dial 911. Now available from your iPhone and Android! Please provide this summary of care documentation to your next provider. Your primary care clinician is listed as [facility]. If you have any questions after today's visit, please call [phone].

## 2018-07-09 NOTE — PROGRESS NOTES
HISTORY OF PRESENT ILLNESS  Mikhail Camp is a 59 y.o. female. Visit Vitals    /85 (BP 1 Location: Right arm, BP Patient Position: Sitting)    Pulse (!) 114    Temp 98.6 °F (37 °C) (Oral)    Resp 20    Ht 5' 1\" (1.549 m)    Wt 165 lb (74.8 kg)    SpO2 97%    BMI 31.18 kg/m2       HPI Comments: Pt is also complaining of pain management tapering her methadone. She also was sent to neurologist for her headaches form pain management. She says she was told her was nothing he could do for her. She has an MRI of the head ordered    Dr. Liseth Barrow also ordered updated xrays of her back    Hypertension    The history is provided by the patient. This is a chronic problem. The current episode started more than 1 week ago. The problem has not changed since onset. Associated symptoms include malaise/fatigue and headaches. Pertinent negatives include no chest pain, no palpitations and no shortness of breath. There are no associated agents to hypertension. Risk factors include postmenopause. Diabetes   The history is provided by the patient. This is a chronic problem. The current episode started more than 1 week ago. The problem occurs daily. The problem has not changed since onset. Associated symptoms include headaches. Pertinent negatives include no chest pain and no shortness of breath. Exacerbated by: diet. The symptoms are relieved by medications (diet). Treatments tried: see med. The treatment provided moderate relief. Review of Systems   Constitutional: Positive for malaise/fatigue. Respiratory: Negative for shortness of breath. Cardiovascular: Negative for chest pain and palpitations. Musculoskeletal: Positive for back pain, joint pain and myalgias. Neurological: Positive for tremors and headaches. Psychiatric/Behavioral: Positive for depression. The patient is nervous/anxious. Physical Exam   Constitutional: She is oriented to person, place, and time.  She appears well-developed and well-nourished. No distress. Cardiovascular: Normal rate and regular rhythm. Pulmonary/Chest: Effort normal and breath sounds normal.   Musculoskeletal: She exhibits no edema. Neurological: She is alert and oriented to person, place, and time. Diabetic foot exam:     Left Foot:   Visual Exam: normal    Pulse DP: 2+ (normal)   Filament test: normal sensation    Vibratory sensation: normal        Gait and balance impaired today      Right Foot:   Visual Exam: small bunion   Pulse DP: 2+ (normal)   Filament test: normal sensation    Vibratory sensation: normal     Skin: Skin is warm and dry. She is not diaphoretic. Psychiatric: Her mood appears anxious. Her speech is delayed. She is slowed. Cognition and memory are impaired. She exhibits a depressed mood. Tremulous, poor concentration   She is inattentive. Nursing note and vitals reviewed. ASSESSMENT and PLAN    ICD-10-CM ICD-9-CM    1. Type 2 diabetes mellitus without complication, without long-term current use of insulin ([facility]) B34.9 334.40 METABOLIC PANEL, COMPREHENSIVE      LIPID PANEL      HEMOGLOBIN A1C W/O EAG      MICROALBUMIN, UR, RAND W/ MICROALB/CREAT RATIO       DIABETES FOOT EXAM   2. Essential hypertension I10 401.9    3. Dyslipidemia E78.5 272.4    4. Sacroiliitis, not elsewhere classified ([facility]) M46.1 720.2 lidocaine (LIDODERM) 5 %   5. Medication refill Z76.0 V68.1 simvastatin (ZOCOR) 40 mg tablet      lidocaine (LIDODERM) 5 %       BP controlled. Tachycardic today, but appears anxious. Update lab today    incidentally re-wrote the lidoderm as it did not go through from pain management--it was printed instead due to too long directions  Discussed briefly rationale for tapering her methadone. She is resistant to changing this. Discussed BMI/weight, lifestyle, diet and exercise. Discussed effect on blood pressure, blood sugar, and joints especially  Focus on limiting white carbs, portion control, and moving more.   She can not exercise right now due to pain    F/u 3-4 months

## 2018-07-10 ENCOUNTER — TELEPHONE (OUTPATIENT)
Dept: INTERNAL MEDICINE CLINIC | Age: 58
End: 2018-07-10

## 2018-07-10 LAB
CREAT UR-MCNC: 54.41 MG/DL (ref 30–125)
MICROALBUMIN UR-MCNC: 0.6 MG/DL (ref 0–3)
MICROALBUMIN/CREAT UR-RTO: 11 MG/G (ref 0–30)

## 2018-07-10 NOTE — TELEPHONE ENCOUNTER
Your PA has been faxed to the plan as a paper copy. Please contact the plan directly if you haven't received a determination in a typical timeframe.

## 2018-07-12 ENCOUNTER — DOCUMENTATION ONLY (OUTPATIENT)
Dept: PAIN MANAGEMENT | Age: 58
End: 2018-07-12

## 2018-07-12 DIAGNOSIS — M70.61 TROCHANTERIC BURSITIS OF BOTH HIPS: ICD-10-CM

## 2018-07-12 DIAGNOSIS — M51.37 DEGENERATION OF LUMBAR OR LUMBOSACRAL INTERVERTEBRAL DISC: ICD-10-CM

## 2018-07-12 DIAGNOSIS — M54.81 BILATERAL OCCIPITAL NEURALGIA: ICD-10-CM

## 2018-07-12 DIAGNOSIS — M70.62 TROCHANTERIC BURSITIS OF BOTH HIPS: ICD-10-CM

## 2018-07-12 RX ORDER — METHADONE HYDROCHLORIDE 10 MG/1
20 TABLET ORAL 3 TIMES DAILY
Qty: 180 TAB | Refills: 0 | Status: SHIPPED | OUTPATIENT
Start: 2018-07-12 | End: 2018-08-02 | Stop reason: SDUPTHER

## 2018-07-12 NOTE — TELEPHONE ENCOUNTER
The pt called the office to report an issue with her methadone prescription. She stated that the next fill date on the prescription is not until 08/08/18. She does not have a prescription for July. A chart review was done and a  was obtained. The last fill date for her methadone was 06/08/18. The ordering physician is not in the office this week and this issue was brought to provider SHANTEL Garcia for review. He stated that he can write the prescription for the correct fill date. I called and spoke to the pt. She was made aware of the above and was instructed to bring back the incorrect prescription to be exchanged. The pt verbalized understanding and has no questions at this time. She will come by the office today.

## 2018-07-12 NOTE — PROGRESS NOTES
The office received notification that the pt declined the back order that was ordered recently. Provider will be made aware. Pt reported to rep that this would be too painful to wear.

## 2018-08-02 ENCOUNTER — OFFICE VISIT (OUTPATIENT)
Dept: PAIN MANAGEMENT | Age: 58
End: 2018-08-02

## 2018-08-02 VITALS
DIASTOLIC BLOOD PRESSURE: 84 MMHG | SYSTOLIC BLOOD PRESSURE: 132 MMHG | HEIGHT: 61 IN | WEIGHT: 165 LBS | HEART RATE: 111 BPM | RESPIRATION RATE: 16 BRPM | TEMPERATURE: 99.2 F | BODY MASS INDEX: 31.15 KG/M2

## 2018-08-02 DIAGNOSIS — M54.81 BILATERAL OCCIPITAL NEURALGIA: ICD-10-CM

## 2018-08-02 DIAGNOSIS — M79.7 FIBROMYALGIA: ICD-10-CM

## 2018-08-02 DIAGNOSIS — G89.4 CHRONIC PAIN SYNDROME: ICD-10-CM

## 2018-08-02 DIAGNOSIS — Z79.899 ENCOUNTER FOR LONG-TERM (CURRENT) USE OF HIGH-RISK MEDICATION: ICD-10-CM

## 2018-08-02 DIAGNOSIS — M51.37 DEGENERATION OF LUMBAR OR LUMBOSACRAL INTERVERTEBRAL DISC: ICD-10-CM

## 2018-08-02 DIAGNOSIS — M70.62 TROCHANTERIC BURSITIS OF BOTH HIPS: ICD-10-CM

## 2018-08-02 DIAGNOSIS — M70.61 TROCHANTERIC BURSITIS OF BOTH HIPS: ICD-10-CM

## 2018-08-02 DIAGNOSIS — M47.817 LUMBOSACRAL SPONDYLOSIS WITHOUT MYELOPATHY: ICD-10-CM

## 2018-08-02 DIAGNOSIS — M54.17 LUMBOSACRAL RADICULOPATHY: ICD-10-CM

## 2018-08-02 RX ORDER — METHADONE HYDROCHLORIDE 10 MG/1
TABLET ORAL
Qty: 150 TAB | Refills: 0 | Status: SHIPPED | OUTPATIENT
Start: 2018-08-11 | End: 2018-08-30 | Stop reason: SDUPTHER

## 2018-08-02 RX ORDER — DOCUSATE SODIUM 100 MG/1
100 CAPSULE, LIQUID FILLED ORAL
Qty: 60 CAP | Refills: 2 | Status: SHIPPED | OUTPATIENT
Start: 2018-08-02 | End: 2018-10-31

## 2018-08-02 RX ORDER — METHYLPREDNISOLONE 4 MG/1
TABLET ORAL
Qty: 1 DOSE PACK | Refills: 0 | Status: SHIPPED | OUTPATIENT
Start: 2018-08-02 | End: 2018-08-22

## 2018-08-02 NOTE — PROGRESS NOTES
HISTORY OF PRESENT ILLNESS Eber Kumar is a 59 y.o. female HPI: Ms. Mo Mg  returns today for f/u of chronic lower back pain due to lumbar degenerative disc disease and spondylosis. She also suffers with chronic trochanteric bursitis. She has a history of hip injections with some improvement. No H/o lumbar surgery. H/o acupuncture, biofeed/meditation at [facility], and lumbar trigger point injections. She is now under the care of neurology for chronic migraines. She does not remember trying epidural injections. PPMHX: Diabetes, HTN, Seizures(2003), Hyperlipidemia and Obesity and Depression. Today is my first visit with Ms. Mo Mg. She continues previously seen by Dr. Salvatore Sagastume. She reports her last appointment was rescheduled. We discussed her current condition and medications in detail today. She reports that she does have a new neurologist and is undergoing further workup regarding her migraine headaches. Medication for headaches has been continue with her neurologist.  She also has a new updated EKG which was reviewed and discussed in the office today. See results below. She is in intensive pain during our office visit. She is not able to sit still for very long. She has had a pain has been worsening since starting her taper of methadone. She says she has been on methadone for many years and has been significantly controlling her pain. She has lumbar MRI from 2014 which was reviewed and discussed in the office today. Please see results below. She has tried and failed many treatments in the past.  She does not remember trying lumbar epidural injections. She is planning to receive occipital nerve blocks by Dr. Salvatore Sagastume for neck pain/headaches and these will be rescheduled. She has not discussed epidural injections at this time but will do so follow-up appointment. Trochanteric bursa injections have been helpful for her hip pain. We will plan to repeat these when needed.   Due to her intensive pain during today's visit we did discuss adding Medrol Dosepak. These have been quite effective in the past and she reports that she has not had steroid taper in quite some time. I will have her follow-up with Dr. Calixto Lynn next visit for further evaluation and recommendation. Current medication management includes Methadone 9 mg tablets, 2 tablets every 8 hours. Medications are helping with pain control and quality of life. Her pain is 4/10 with medication and 10/10 without. Pt describes pain as aching and throbbing. Aggravating factors include standing and walking. Relieved with rest, medication, and avoiding painful activities. Current treatment is helping to improve general activity, mood, walking, sleep, enjoyment of life Ms. Sumi Mckinley is tolerating medications well, with no side effects noted. She is able to stay more active with less discomfort with these current doses. The patient reports an average of 60% pain relief with current treatment/medications. She is informed of side effects, risks, and benefits of this regimen, and emphasizes that she derives a significant improvement in functionality and quality of life, and notes that non-opioid medications and therapies in the past have not offered significant benefit. Pt does report constipation but is well controlled with OTC medication She  is otherwise doing well with no other complaints today. She denies any adverse events including nausea, vomiting, dizziness, increased constipation, hallucinations, or seizures. Because the patient's current regimen places him/her at increased risk for possible overdose, a prescription for naloxone nasal spray has been provided.   The patient understands that this medication is only to be used in the setting of a possible overdose and that inadvertent use of this medication could precipitate overt withdrawal. 
 
PRIOR IMAGIN. Lumbar MRI 2014:   No significant central canal stenosis in the lumbar spine. Multilevel degenerative changes as described above. Transitional lumbosacral segment with partial sacralization of the L5 vertebral body. Correlation with imaging is recommended prior to spinal intervention. 
  
 
COMM: 5 Oswestry: 36 Last UDS has been reviewed EKG 2018: QT/QTc = 366/459 Ek17: QT/QTc: 360/426 Allergies Allergen Reactions  Zanaflex [Tizanidine] Other (comments) Nausea and worsening headache Past Surgical History:  
Procedure Laterality Date  HX  SECTION    
 X 2  
 HX COLONOSCOPY    
 ? f/u  
 HX PARTIAL HYSTERECTOMY Review of Systems Constitutional: Negative for chills, fever and weight loss. HENT: Negative for congestion and sore throat. Eyes: Negative for blurred vision and double vision. Respiratory: Negative for cough, shortness of breath and wheezing. Cardiovascular: Negative for chest pain and palpitations. Gastrointestinal: Positive for constipation. Negative for abdominal pain, diarrhea, heartburn, nausea and vomiting. Genitourinary: Negative. Musculoskeletal: Positive for back pain, joint pain and neck pain. Neurological: Positive for headaches. Negative for dizziness, seizures and loss of consciousness. Endo/Heme/Allergies: Does not bruise/bleed easily. Psychiatric/Behavioral: Negative for depression. The patient is nervous/anxious. The patient does not have insomnia. Physical Exam  
Constitutional: She is oriented to person, place, and time and well-developed, well-nourished, and in no distress. No distress. HENT:  
Head: Normocephalic and atraumatic. Pulmonary/Chest: Effort normal.  
Musculoskeletal:  
     Lumbar back: She exhibits decreased range of motion, tenderness and pain. Neurological: She is alert and oriented to person, place, and time. Skin: Skin is warm and dry. She is not diaphoretic.   
Psychiatric: Memory, affect and judgment normal. Her mood appears anxious. Nursing note and vitals reviewed. ASSESSMENT:   
1. Encounter for long-term (current) use of high-risk medication 2. Chronic pain syndrome 3. Degeneration of lumbar or lumbosacral intervertebral disc 4. Lumbosacral radiculopathy 5. Lumbosacral spondylosis without myelopathy 6. Fibromyalgia 7. Trochanteric bursitis of both hips 8. Bilateral occipital neuralgia [place] Prescription Monitoring Program was reviewed which does not demonstrate aberrancies and/or inconsistencies with regard to the historical prescribing of controlled medications to this patient by other providers. PLAN / Pt Instructions: 1. Modify current plan with no evidence of addiction or diversion. Stable on current medication without adverse events. 2. Refill and adjust methadone 10 mg down to 2 tablets in the morning, 1 tablet in the afternoon, and 2 tablets in the evening in 8 hour divided doses ×1 month. 3. Continue to schedule occipital nerve block with Dr. Perla Osuna. 4. Continue to follow-up with neurologist regarding headaches 5. Add Medrol Dosepak. Please hold any anti-inflammatory medications while taking this medication. 6. Add home exercise program.  Exercise/stretching were demonstrated in office today. 7. Naloxone 4 mg nasal spray for opioid induced respiratory depression emergency only. 8. Discussed risks of addiction, dependency, and opioid induced hyperalgesia. 9. Return to clinic in 1 month with Dr. Perla Osuna. Medications Ordered Today Medications  docusate sodium (COLACE) 100 mg capsule Sig: Take 1 Cap by mouth two (2) times daily as needed for Constipation for up to 90 days. Dispense:  60 Cap Refill:  2  
 methadone (DOLOPHINE) 10 mg tablet Sig: Take 2 tablets in the morning, 1 tablet in the afternoon, and 2 tablets in the evening in 8 hour divided doses x 1 month  Indications: Chronic Pain Dispense:  150 Tab   Refill:  0  
 methylPREDNISolone (MEDROL DOSEPACK) 4 mg tablet Sig: Use as directed Dispense:  1 Dose Pack Refill:  0  
 
 
 
DISPOSITION 
 
 Pain medications are prescribed with the objective of pain relief and improved physical and psychosocial function in this patient.  Pain Meds and Quality Of Life have been reviewed. Nonpharmacologic therapy and non-opioid pharmacologic therapy have been considered. Opioid therapy is only prescribed if the expected benefits are anticipated to outweigh risks.  Counseled patient on proper use of prescribed medications.  Reviewed with patient self-help tools, home exercise, and lifestyle changes to assist the patient in self-management of symptoms.  Reviewed with patient the treatment plan, goals of treatment plan, and limitations of treatment plan, to include the potential for side effects from medications and procedures. If side effects occur, it is the responsibility of the patient to inform the clinic so that a change in the treatment plan can be made in a safe manner. The patient is advised that stopping prescribed medication may cause an increase in symptoms and possible medication withdrawal symptoms. The patient is informed an emergency room evaluation may be necessary if this occurs. Spent 25 minutes with patient today which more than 50% of that time was spent on counseling and coordination of care. Jean Sellers 8/2/2018 Note: Please excuse any typographical errors. Voice recognition software was used for this note and may cause mistakes.

## 2018-08-02 NOTE — PROGRESS NOTES
Nursing Notes Patient presents to the office today in follow-up. Patient rates her pain at 6/10 on the numerical pain scale. Reviewed medications with counts as follows:   
Rx Date filled Qty Dispensed Pill Count Last Dose Short Methadone 10 mg 07/12/18 180 62 This am no Comments: Patient is here today for a follow appt today she states her pain level today is a 6 PHQ 9 was done patient is depressed. She states she went to a neuro MD and he stated he could not do anything for her. Please advise POC UDS was not performed in office today Any new labs or imaging since last appointment? YES labs at [facility] Have you been to an emergency room (ER) or urgent care clinic since your last visit? NO Have you been hospitalized since your last visit? NO If yes, where, when, and reason for visit? Have you seen or consulted any other health care providers outside of the [address] [place] [address]  since your last visit? YES pcm and neurologist     
If yes, where, when, and reason for visit? Ms. Yessenia Garcia has a reminder for a \"due or due soon\" health maintenance. I have asked that she contact her primary care provider for follow-up on this health maintenance.

## 2018-08-02 NOTE — PATIENT INSTRUCTIONS
1. Modify current plan with no evidence of addiction or diversion. Stable on current medication without adverse events. 2. Refill and adjust methadone 10 mg down to 2 tablets in the morning, 1 tablet in the afternoon, and 2 tablets in the evening in 8 hour divided doses ×1 month. 3. Continue to schedule occipital nerve block with Dr. Lexi West. 4. Continue to follow-up with neurologist regarding headaches 5. Add Medrol Dosepak. Please hold any anti-inflammatory medications while taking this medication. 6. Add home exercise program.  Exercise/stretching were demonstrated in office today. 7. Naloxone 4 mg nasal spray for opioid induced respiratory depression emergency only. 8. Discussed risks of addiction, dependency, and opioid induced hyperalgesia. 9. Return to clinic in 1 month with Dr. Lexi West.

## 2018-08-02 NOTE — MR AVS SNAPSHOT
[address] [facility] [address] 
[phone] Patient: Rebeka Hightower MRN: [id number] JBX:[id number]/[id number] Visit Information Date & Time Provider Department Dept. Phone Encounter #  
 8/2/2018 11:20 AM SHANTEL Sellers [facility] [id number] Follow-up Instructions Return in about 1 month (around 9/2/2018). Your Appointments 8/22/2018  2:00 PM  
New Patient with Tera Zee MD  
[facility]  [address]) Appt Note: Degeneration of lumbar or lumbosacral intervertebral disc/ok per Alysia/ ref by Sunita Salazar/ROSANA 2:30PM, ID, INS CARD, MED LIST/RSD 06-05-18 APPT TO THIS DATE; PT CX 7/16/18 APPT DUE TO TRANSPORTATION  
 [address] [facility] [address]  
[phone]  
  
   
 [address]  
  
    
 11/12/2018  2:00 PM  
Office Visit with John Perez MD  
[facility] ([address] [facility]) Appt Note: 4 month f/u combo clinic, chol  
 [address]  
  
   
 [address] [facility] Upcoming Health Maintenance Date Due COLONOSCOPY 1/20/2017 EYE EXAM RETINAL OR DILATED Q1 3/14/2018 Influenza Age 5 to Adult 8/1/2018 Pneumococcal 19-64 Medium Risk (1 of 1 - PPSV23) 10/1/2018* DTaP/Tdap/Td series (1 - Tdap) 12/26/2018* ZOSTER VACCINE AGE 60> 4/4/2019* MEDICARE YEARLY EXAM 12/27/2018 HEMOGLOBIN A1C Q6M 1/9/2019 BREAST CANCER SCRN MAMMOGRAM 1/17/2019 FOOT EXAM Q1 7/9/2019 MICROALBUMIN Q1 7/9/2019 LIPID PANEL Q1 7/9/2019 PAP AKA CERVICAL CYTOLOGY 12/26/2020 *Topic was postponed. The date shown is not the original due date.    
  
Allergies as of 8/2/2018  Review Complete On: 8/2/2018 By: SHANTEL Sellers  
  
 Severity Noted Reaction Type Reactions Zanaflex [Tizanidine]  04/18/2016   Side Effect Other (comments) Nausea and worsening headache Current Immunizations  Reviewed on 4/3/2018 Name Date Influenza Vaccine 9/30/2015  2:50 PM  
 Influenza Vaccine (Quad) PF 12/26/2017, 12/15/2016 Pneumococcal Conjugate (PCV-13) 12/15/2016 Not reviewed this visit You Were Diagnosed With   
  
 Codes Comments Encounter for long-term (current) use of high-risk medication     ICD-10-CM: Z79.899 ICD-9-CM: V58.69 Chronic pain syndrome     ICD-10-CM: G89.4 ICD-9-CM: 338.4 Degeneration of lumbar or lumbosacral intervertebral disc     ICD-10-CM: M51.37 
ICD-9-CM: 722.52 Lumbosacral radiculopathy     ICD-10-CM: M54.17 ICD-9-CM: 724.4 Lumbosacral spondylosis without myelopathy     ICD-10-CM: B51.331 ICD-9-CM: 721.3 Fibromyalgia     ICD-10-CM: M79.7 ICD-9-CM: 729.1 Trochanteric bursitis of both hips     ICD-10-CM: M70.61, M70.62 ICD-9-CM: 726.5 Bilateral occipital neuralgia     ICD-10-CM: M54.81 ICD-9-CM: 723.8 Vitals BP Pulse Temp Resp Height(growth percentile) Weight(growth percentile) 132/84 (BP 1 Location: Left arm, BP Patient Position: Sitting) (!) 111 99.2 °F (37.3 °C) 16 5' 1\" (1.549 m) 165 lb (74.8 kg) BMI OB Status Smoking Status 31.18 kg/m2 Hysterectomy Never Smoker BMI and BSA Data Body Mass Index Body Surface Area  
 31.18 kg/m 2 1.79 m 2 Preferred Pharmacy Pharmacy Name Phone [address], [address] [place] [address]  [phone] Your Updated Medication List  
  
   
This list is accurate as of 8/2/18  1:12 PM.  Always use your most recent med list.  
  
  
  
  
 atenolol 25 mg tablet Commonly known as:  TENORMIN Take 1 Tab by mouth daily. Indications: hypertension  
  
 docusate sodium 100 mg capsule Commonly known as:  Lorie Eisenmenger  
 Take 1 Cap by mouth two (2) times daily as needed for Constipation for up to 90 days. fluorometholone 0.1 % ophthalmic suspension Commonly known as: [address] [facility] SHAKE LQ AND INT 1 GTT IN OU BID  
  
 lidocaine 5 % Commonly known as:  Lorenzo Bahena Apply one patch and leave on 12 hours then remove for 12 hours. Indications: pain LORazepam 1 mg tablet Commonly known as:  ATIVAN  
TAKE 1 TABLET BY MOUTH TWICE DAILY AS NEEDED FOR ANXIETY  
  
 metFORMIN  mg tablet Commonly known as:  GLUCOPHAGE XR Take 1 Tab by mouth daily. Indications: type 2 diabetes mellitus  
  
 methadone 10 mg tablet Commonly known as:  DOLOPHINE Take 2 tablets in the morning, 1 tablet in the afternoon, and 2 tablets in the evening in 8 hour divided doses x 1 month  Indications: Chronic Pain Start taking on:  8/11/2018  
  
 methylphenidate HCl 10 mg tablet Commonly known as:  RITALIN Take 1 Tab by mouth three (3) times daily. methylPREDNISolone 4 mg tablet Commonly known as:  Sid Hoist Use as directed NexIUM 40 mg capsule Generic drug:  esomeprazole TK 1 C PO QD AT 3PM  
  
 OTHER  
DDS Lumbar Traction Belt Use as directed  
  
 promethazine 25 mg tablet Commonly known as:  PHENERGAN Take 1 Tab by mouth every six (6) hours as needed for Nausea. Indications: nausea  
  
 senna-docusate 8.6-50 mg per tablet Commonly known as:  PERICOLACE  
1-2 tabs daily as needed for constipation  
  
 simvastatin 40 mg tablet Commonly known as:  ZOCOR  
TAKE 1 TABLET BY MOUTH EVERY NIGHT AT BEDTIME  Indications: hypercholesterolemia  
  
 topiramate 50 mg tablet Commonly known as:  TOPAMAX Take 3 Tabs by mouth two (2) times a day. Indications: MIGRAINE PREVENTION, seizure XIIDRA 5 % Dpet Generic drug:  lifitegrast  
  
  
  
  
Prescriptions Printed Refills  
 methadone (DOLOPHINE) 10 mg tablet 0 Starting on: 8/11/2018 Sig: Take 2 tablets in the morning, 1 tablet in the afternoon, and 2 tablets in the evening in 8 hour divided doses x 1 month  Indications: Chronic Pain Class: Print Prescriptions Sent to Pharmacy Refills  
 docusate sodium (COLACE) 100 mg capsule 2 Sig: Take 1 Cap by mouth two (2) times daily as needed for Constipation for up to 90 days. Class: Normal  
 Pharmacy: [facility] [address] [place] [address]  Ph #: [phone] Route: Oral  
 methylPREDNISolone (MEDROL DOSEPACK) 4 mg tablet 0 Sig: Use as directed Class: Normal  
 Pharmacy: [facility] [address], [address] [place] [address]  Ph #: [phone] Follow-up Instructions Return in about 1 month (around 9/2/2018). Patient Instructions 1. Modify current plan with no evidence of addiction or diversion. Stable on current medication without adverse events. 2. Refill and adjust methadone 10 mg down to 2 tablets in the morning, 1 tablet in the afternoon, and 2 tablets in the evening in 8 hour divided doses ×1 month. 3. Continue to schedule occipital nerve block with Dr. Jael Coy. 4. Continue to follow-up with neurologist regarding headaches 5. Add Medrol Dosepak. Please hold any anti-inflammatory medications while taking this medication. 6. Add home exercise program.  Exercise/stretching were demonstrated in office today. 7. Naloxone 4 mg nasal spray for opioid induced respiratory depression emergency only. 8. Discussed risks of addiction, dependency, and opioid induced hyperalgesia. 9. Return to clinic in 1 month with Dr. Jael Coy. Introducing [facility] & HEALTH SERVICES! [facility] introduces adjust patient portal. Now you can access parts of your medical record, email your doctor's office, and request medication refills online.    
 
1. In your internet browser, go to [email or web address] [employer]art 2. Click on the First Time User? Click Here link in the Sign In box. You will see the New Member Sign Up page. 3. Enter your [employer] Access Code exactly as it appears below. You will not need to use this code after youve completed the sign-up process. If you do not sign up before the expiration date, you must request a new code. · [employer] Access Code: [email or web address] Expires: 10/31/2018  1:12 PM 
 
4. Enter the last four digits of your Social Security Number (xxxx) and Date of Birth (mm/dd/yyyy) as indicated and click Submit. You will be taken to the next sign-up page. 5. Create a [employer] ID. This will be your [employer] login ID and cannot be changed, so think of one that is secure and easy to remember. 6. Create a [employer] password. You can change your password at any time. 7. Enter your Password Reset Question and Answer. This can be used at a later time if you forget your password. 8. Enter your e-mail address. You will receive e-mail notification when new information is available in [address] 9. Click Sign Up. You can now view and download portions of your medical record. 10. Click the Download Summary menu link to download a portable copy of your medical information. If you have questions, please visit the Frequently Asked Questions section of the [employer] website. Remember, [employer] is NOT to be used for urgent needs. For medical emergencies, dial 911. Now available from your iPhone and Android! Please provide this summary of care documentation to your next provider. Your primary care clinician is listed as [facility]. If you have any questions after today's visit, please call [phone].

## 2018-08-22 ENCOUNTER — OFFICE VISIT (OUTPATIENT)
Dept: ORTHOPEDIC SURGERY | Age: 58
End: 2018-08-22

## 2018-08-22 VITALS
WEIGHT: 164.6 LBS | SYSTOLIC BLOOD PRESSURE: 123 MMHG | HEIGHT: 61 IN | HEART RATE: 115 BPM | RESPIRATION RATE: 22 BRPM | BODY MASS INDEX: 31.08 KG/M2 | OXYGEN SATURATION: 95 % | DIASTOLIC BLOOD PRESSURE: 85 MMHG | TEMPERATURE: 99.2 F

## 2018-08-22 DIAGNOSIS — M79.7 FIBROMYALGIA: ICD-10-CM

## 2018-08-22 DIAGNOSIS — M54.32 BILATERAL SCIATICA: ICD-10-CM

## 2018-08-22 DIAGNOSIS — M54.31 BILATERAL SCIATICA: ICD-10-CM

## 2018-08-22 DIAGNOSIS — M43.16 SPONDYLOLISTHESIS OF LUMBAR REGION: Primary | ICD-10-CM

## 2018-08-22 NOTE — MR AVS SNAPSHOT
[address] [facility] [address] [facility] [address] 
[phone] Patient: Robson Velozite MRN: [id number]:[id number] Visit Information Date & Time Provider Department Dept. Phone Encounter #  
 8/22/2018  2:00  [address], MD [address] [facility] [address] [facility] [phone] [id number] Follow-up Instructions Return for MRI/CT f/u. Your Appointments 8/30/2018 10:10 AM  
Follow Up with Kiana Eisenberg DO [address] (CLAUDE SCHEDULING) Appt Note: 30 min followup by [id number] with Dr Trujillo - patient confirmed date - Tiki 10:36am  
 [address] [facility] [address]  
[phone] [address]  
  
    
 11/12/2018  2:00 PM  
Office Visit with Kofi Castro MD  
[address] ([address]) Appt Note: 4 month f/u combo clinic, chol  
 [address]  
  
   
 [address] [facility] Upcoming Health Maintenance Date Due COLONOSCOPY 1/20/2017 EYE EXAM RETINAL OR DILATED Q1 3/14/2018 Influenza Age 5 to Adult 8/1/2018 Pneumococcal 19-64 Medium Risk (1 of 1 - PPSV23) 10/1/2018* DTaP/Tdap/Td series (1 - Tdap) 12/26/2018* ZOSTER VACCINE AGE 60> 4/4/2019* MEDICARE YEARLY EXAM 12/27/2018 HEMOGLOBIN A1C Q6M 1/9/2019 BREAST CANCER SCRN MAMMOGRAM 1/17/2019 FOOT EXAM Q1 7/9/2019 MICROALBUMIN Q1 7/9/2019 LIPID PANEL Q1 7/9/2019 PAP AKA CERVICAL CYTOLOGY 12/26/2020 *Topic was postponed. The date shown is not the original due date. Allergies as of 8/22/2018  Review Complete On: 8/22/2018 By: Scooter Hernandez Severity Noted Reaction Type Reactions Zanaflex [Tizanidine]  04/18/2016   Side Effect Other (comments) Nausea and worsening headache Current Immunizations  Reviewed on 4/3/2018 Name Date Influenza Vaccine 9/30/2015  2:50 PM  
 Influenza Vaccine (Quad) PF 12/26/2017, 12/15/2016 Pneumococcal Conjugate (PCV-13) 12/15/2016 Not reviewed this visit You Were Diagnosed With   
  
 Codes Comments Spondylolisthesis of lumbar region    -  Primary ICD-10-CM: M43.16 
ICD-9-CM: 738.4 Bilateral sciatica     ICD-10-CM: M54.31, M54.32 
ICD-9-CM: 724.3 Fibromyalgia     ICD-10-CM: M79.7 ICD-9-CM: 729.1 Vitals BP Pulse Temp Resp Height(growth percentile) Weight(growth percentile) 123/85 (!) 115 99.2 °F (37.3 °C) 22 5' 1\" (1.549 m) 164 lb 9.6 oz (74.7 kg) SpO2 BMI OB Status Smoking Status 95% 31.1 kg/m2 Hysterectomy Never Smoker Vitals History BMI and BSA Data Body Mass Index Body Surface Area  
 31.1 kg/m 2 1.79 m 2 Preferred Pharmacy Pharmacy Name Phone [facility], [address] [place] [address] [phone] Your Updated Medication List  
  
   
This list is accurate as of 8/22/18  3:12 PM.  Always use your most recent med list.  
  
  
  
  
 atenolol 25 mg tablet Commonly known as:  TENORMIN Take 1 Tab by mouth daily. Indications: hypertension  
  
 docusate sodium 100 mg capsule Commonly known as:  Anoop Bough Take 1 Cap by mouth two (2) times daily as needed for Constipation for up to 90 days. lidocaine 5 % Commonly known as:  Marcos Halt Apply one patch and leave on 12 hours then remove for 12 hours. Indications: pain LORazepam 1 mg tablet Commonly known as:  ATIVAN  
TAKE 1 TABLET BY MOUTH TWICE DAILY AS NEEDED FOR ANXIETY  
  
 metFORMIN  mg tablet Commonly known as:  GLUCOPHAGE XR Take 1 Tab by mouth daily. Indications: type 2 diabetes mellitus  
  
 methadone 10 mg tablet Commonly known as:  DOLOPHINE  
 Take 2 tablets in the morning, 1 tablet in the afternoon, and 2 tablets in the evening in 8 hour divided doses x 1 month  Indications: Chronic Pain  
  
 methylphenidate HCl 10 mg tablet Commonly known as:  RITALIN Take 1 Tab by mouth three (3) times daily. NexIUM 40 mg capsule Generic drug:  esomeprazole TK 1 C PO QD AT 3PM  
  
 OTHER  
DDS Lumbar Traction Belt Use as directed  
  
 promethazine 25 mg tablet Commonly known as:  PHENERGAN Take 1 Tab by mouth every six (6) hours as needed for Nausea. Indications: nausea  
  
 senna-docusate 8.6-50 mg per tablet Commonly known as:  PERICOLACE  
1-2 tabs daily as needed for constipation  
  
 simvastatin 40 mg tablet Commonly known as:  ZOCOR  
TAKE 1 TABLET BY MOUTH EVERY NIGHT AT BEDTIME  Indications: hypercholesterolemia  
  
 topiramate 50 mg tablet Commonly known as:  TOPAMAX Take 3 Tabs by mouth two (2) times a day. Indications: MIGRAINE PREVENTION, seizure Follow-up Instructions Return for MRI/CT f/u. To-Do List   
 08/22/2018 Imaging:  MRI LUMB SPINE WO CONT   
  
 08/25/2018 12:30 PM  
  Appointment with [facility] MRI RM 1 at [address] [place] [address] MRI ([phone]) GENERAL INSTRUCTIONS  Bring information (ID card) if you have any medically implanted devices. You will be required to lie still while the procedure is being performed. Remove any jewelry (including body piercing, hairpins) prior to MRI. If you have had a creatinine level drawn within the past 30 days, please bring most recent results to your appt. Bring any films, CD's, and reports related to your study with you on the day of your exam.  This only includes studies done outside of [address] [place] [address], [place] [address], and [place].   Bring a complete list of all medications you are currently taking to include prescriptions, over-the-counter meds, herbals, vitamins & any dietary supplements. If you were given medications for claustrophobia or anxiety, please arrange to have someone drive you to your appointment. QUESTIONS  Notify the MRI Department if you have any questions concerning your study. [place] - [phone] Nicole Buchanan - [address] [place] [address] - [phone] Patient Instructions Spondylolysis and Spondylolisthesis: Exercises Your Care Instructions Here are some examples of typical rehabilitation exercises for your condition. Start each exercise slowly. Ease off the exercise if you start to have pain. Your doctor or physical therapist will tell you when you can start these exercises and which ones will work best for you. How to do the exercises Single knee-to-chest 
 
1. Lie on your back with your knees bent and your feet flat on the floor. You can put a small pillow under your head and neck if it is more comfortable. 2. Bring one knee to your chest, keeping the other foot flat on the floor. 3. Keep your lower back pressed to the floor. Hold for 15 to 30 seconds. 4. Relax, and lower the knee to the starting position. 5. Repeat with the other leg. Repeat 2 to 4 times with each leg. 6. To get more stretch, put your other leg flat on the floor while pulling your knee to your chest. 
Double knee-to-chest 
 
1. Lie on your back with your knees bent and your feet flat on the floor. You can put a small pillow under your head and neck if it is more comfortable. 2. Bring both knees to your chest. 
3. Keep your lower back pressed to the floor. Hold for 15 to 30 seconds. 4. Relax, and lower your knees to the starting position. 5. Repeat 2 to 4 times. Alternate arm and leg (bird dog) exercise Do this exercise slowly. Try to keep your body straight at all times. 1. Start on the floor, on your hands and knees.  
2. Tighten your belly muscles by pulling your belly button in toward your spine. Be sure you continue to breathe normally and do not hold your breath. 3. Raise one arm off the floor, and hold it straight out in front of you. Be careful not to let your shoulder drop down, because that will twist your trunk. 4. Hold for about 6 seconds, then lower your arm and switch to your other arm. 5. Repeat 8 to 12 times on each arm. 6. When you can do this exercise with ease and no pain, repeat steps 1 through 5. But this time do it with one leg raised off the floor, holding your leg straight out behind you. Be careful not to let your hip drop down, because that will twist your trunk. 7. When holding your leg straight out becomes easier, try raising your opposite arm at the same time, and repeat steps 1 through 5. Bridging 1. Lie on your back with both knees bent. Your knees should be bent about 90 degrees. 2. Then push your feet into the floor, squeeze your buttocks, and lift your hips off the floor until your shoulders, hips, and knees are all in a straight line. 3. Hold for about 6 seconds as you continue to breathe normally, and then slowly lower your hips back down to the floor and rest for up to 10 seconds. 4. Repeat 8 to 12 times. Curl-ups 1. Lie on the floor on your back with your knees bent at a 90-degree angle. Your feet should be flat on the floor, about 12 inches from your buttocks. 2. Cross your arms over your chest. If this bothers your neck, try putting your hands behind your neck (not your head), with your elbows spread apart. 3. Slowly tighten your belly muscles and raise your shoulder blades off the floor. 4. Keep your head in line with your body, and do not press your chin to your chest. 
5. Hold this position for 1 or 2 seconds, then slowly lower yourself back down to the floor. 6. Repeat 8 to 12 times. Dianna Del Cid Do this exercise slowly. Try to keep your body straight at all times, and do not let one hip drop lower than the other. 1. Lie on your stomach, resting your upper body on your forearms. 2. Tighten your belly muscles by pulling your belly button in toward your spine. 3. Keeping your knees on the floor, press down with your forearms to lift your upper body off the floor. 4. Hold for about 6 seconds, then lower your body to the floor. Rest for up to 10 seconds. 5. Repeat 8 to 12 times. 6. Over time, work up to holding for 15 to 30 seconds each time. 7. If this exercise is easy to do with your knees on the floor, try doing this exercise with your knees and legs straight, supported by your toes on the floor. Follow-up care is a key part of your treatment and safety. Be sure to make and go to all appointments, and call your doctor if you are having problems. It's also a good idea to know your test results and keep a list of the medicines you take. Where can you learn more? Go to http://mary-consuelo.info/. Enter [phone] in the search box to learn more about \"Spondylolysis and Spondylolisthesis: Exercises. \" Current as of: November 29, 2017 Content Version: 11.7 © 5141-5915 [employer]. Care instructions adapted under license by [employer] (which disclaims liability or warranty for this information). If you have questions about a medical condition or this instruction, always ask your healthcare professional. [place] [address] any warranty or liability for your use of this information. Introducing [facility] & HEALTH SERVICES! Ramiro Gomes introduces Back[employer]Agent patient portal. Now you can access parts of your medical record, email your doctor's office, and request medication refills online. 1. In your internet browser, go to [email or web address] 2. Click on the First Time User? Click Here link in the Sign In box. You will see the New Member Sign Up page. 3. Enter your [employer] Access Code exactly as it appears below.  You will not need to use this code after youve completed the sign-up process. If you do not sign up before the expiration date, you must request a new code. · [employer] Access Code: [email or web address] Expires: 10/31/2018  1:12 PM 
 
4. Enter the last four digits of your Social Security Number (xxxx) and Date of Birth (mm/dd/yyyy) as indicated and click Submit. You will be taken to the next sign-up page. 5. Create a [employer] ID. This will be your [employer] login ID and cannot be changed, so think of one that is secure and easy to remember. 6. Create a [employer] password. You can change your password at any time. 7. Enter your Password Reset Question and Answer. This can be used at a later time if you forget your password. 8. Enter your e-mail address. You will receive e-mail notification when new information is available in [address] 9. Click Sign Up. You can now view and download portions of your medical record. 10. Click the Download Summary menu link to download a portable copy of your medical information. If you have questions, please visit the Frequently Asked Questions section of the [employer] website. Remember, [employer] is NOT to be used for urgent needs. For medical emergencies, dial 911. Now available from your iPhone and Android! Please provide this summary of care documentation to your next provider. Your primary care clinician is listed as [facility]. If you have any questions after today's visit, please call [phone].

## 2018-08-22 NOTE — PROGRESS NOTES
[address]  [address], [address]  [place], [facility]  Phone: [phone]  Fax: [phone]        Ana M Forbes  : 1960  PCP: Hai Lux MD      NEW PATIENT      ASSESSMENT AND PLAN     Diagnoses and all orders for this visit:    1. Spondylolisthesis of lumbar region  -     MRI LUMB SPINE WO CONT; Future    2. Bilateral sciatica  -     MRI LUMB SPINE WO CONT; Future    3. Fibromyalgia       1. Advised to stay active as tolerated. 2. Given list of PM practices for medication 2nd opinion  3. MRI lumbar spine, lower back pain, bilateral lower extremities/failed physical therapy/meds/biofeedback  4. Given information on spondylolisthesis    Follow-up Disposition:  Return for MRI/CT f/u. CHIEF COMPLAINT  Grayson Carlos is seen today in consultation at the request of Dr. Juvenal Kim for complaints of low back and hip pain. HISTORY OF PRESENT ILLNESS  Grayson Carlos is a 59 y.o. female. Today pt c/o low back, hip and fibromyalgia pain of 20 years duration. Pt denies any specific incident or injury that caused their pain. Pt at [facility] x yrs. Pt states she was on Methadone over 20 years with benefit. She states she has been on many different medications for headaches, thus developing a high tolerance to medications, which is why the dose of Methadone was higher than usual.  Pt states she is being weaned from Methadone with increased pain, such that she is 'just surviving'. She states [facility] did not provide referrals to other practices that will rx her medicines. Pt reports trying biofeedback, psychiatrist, PT, and all she could do before starting medications. Pt reports MDP and Toradol provide minimal effect. Pt admits to knee pain. Pt reports difficulty standing straight. Pt states she does not have a cane at home. Send here for re-eval of LBP.   Xrays, listhesis with motion    Location of pain: low back  Does pain radiate into extremities: RLE into thigh and B/L buttocks  Does patient have weakness: no   Pt denies saddle paresthesias. Medications pt is on: Methadone 20/10/20mg, Topamax 150mg BID for seizures, Lidoderm patches. Flexeril 10mg TID. Pt denies recent ED visits or hospitalizations. Denies persistent fevers, chills, weight changes, neurogenic bowel or bladder symptoms. Treatments patient has tried:  Physical therapy:Yes  Non-opioid medications: Yes MDP and Toradol minimal effect  Spinal injections: Yes, temporary benefit. TPIs with benefit to stand straight  Spinal surgery- No.        reviewed. PMHx of seizures. Pt has a cat. Pain Assessment  8/22/2018   Location of Pain Neck; Shoulder;Back;Leg   Location Modifiers Left;Right   Severity of Pain 6   Quality of Pain Dull; [place]ina; Aching;Burning   Duration of Pain Persistent   Frequency of Pain Constant   Aggravating Factors Standing;Walking;Bending; Other (Comment)   Aggravating Factors Comment Lifting, Changing positions    Limiting Behavior Yes   Relieving Factors Rest;Other (Comment)   Relieving Factors Comment Sitting and lying    Result of Injury No         XRAY Lumbar spine 5/25/18:  Listhesis L4-5, L5-1 with motion on flexion and extension. Full report scanned in system. MRI Results (most recent):    Results from [place] encounter on 10/30/14   MRI LUMB SPINE WO CONT   Narrative CLINICAL HISTORY:  Back pain. PROCEDURE:  MRI of the lumbar spine without contrast.    COMPARISON:  Lumbar radiographs dated 09/10/2014. FINDINGS:    There is transitional lumbosacral segment with partial sacralization of the L5  vertebral body. This is the same nomenclature used on recent radiographs. Correlation with imaging studies recommended prior to spinal intervention. There  is trace 2 mm degenerative anterolisthesis at L4-L5. There is grade I  3 mm  degenerative anterolisthesis of L5-S1. There is trace degenerative  retrolisthesis at L2-L3. There is disc space narrowing at L2-L3 and L5-S1 with  degenerative endplate changes. Conus terminates at L1-L2. LEVELS:    T12-L1:  Mild disc bulge. No central canal or foraminal stenosis. L1-L2:  Mild disc bulge. No central canal or foraminal stenosis    L2-L3:  There is diffuse disc bulge, ligamentum flavum thickening and mild facet  degenerative change. No central canal or foraminal stenosis. L3-L4:  Mild disc bulge, asymmetric to the left. Small superimposed left  foraminal disc protrusion. Mild left foraminal stenosis. No central canal or  right foraminal stenosis. L4-L5:  Mild facet degenerative change. Mild disc bulge. No central canal  stenosis. Mild left foraminal stenosis due to facet degenerative change. L5-S1:  There is diffuse disc bulge and moderate bilateral facet arthropathy. No central canal stenosis. There is mild right foraminal stenosis. IMPRESSION:    1. No significant central canal stenosis in the lumbar spine. 2.   Multilevel degenerative changes as described above. 3.   Transitional lumbosacral segment with partial sacralization of the L5  vertebral body. Correlation with imaging is recommended prior to spinal  intervention.     SA/ih    Electronically signed by: Kailey Hogue  Date:  10/30/2014 14:38           PAST MEDICAL HISTORY   Past Medical History:   Diagnosis Date    Anxiety     Cataract 6/19/15    bilat    Chronic sialoadenitis     Degenerative disc disease     Depression     Diabetes ([address] 2012    Dry eye     Dyslipidemia     Fatigue     uses ritalin for this    Fibromyalgia     GERD (gastroesophageal reflux disease)     Headache(784.0)     daily, all her life    Hypertension     OCD (obsessive compulsive disorder)     PTSD (post-traumatic stress disorder)     Seizure disorder ([address] 2000    Thyroid dysfunction     TMJ (temporomandibular joint disorder)     right    Viral encephalitis 1980's    unknown type from mosquito Past Surgical History:   Procedure Laterality Date    HX  SECTION      X 2    HX COLONOSCOPY      ? f/u    HX PARTIAL HYSTERECTOMY         MEDICATIONS      Current Outpatient Prescriptions   Medication Sig Dispense Refill    docusate sodium (COLACE) 100 mg capsule Take 1 Cap by mouth two (2) times daily as needed for Constipation for up to 90 days. 60 Cap 2    methadone (DOLOPHINE) 10 mg tablet Take 2 tablets in the morning, 1 tablet in the afternoon, and 2 tablets in the evening in 8 hour divided doses x 1 month  Indications: Chronic Pain 150 Tab 0    simvastatin (ZOCOR) 40 mg tablet TAKE 1 TABLET BY MOUTH EVERY NIGHT AT BEDTIME  Indications: hypercholesterolemia 90 Tab 5    topiramate (TOPAMAX) 50 mg tablet Take 3 Tabs by mouth two (2) times a day. Indications: MIGRAINE PREVENTION, seizure 540 Tab 4    lidocaine (LIDODERM) 5 % Apply one patch and leave on 12 hours then remove for 12 hours. Indications: pain 30 Patch 1    metFORMIN ER (GLUCOPHAGE XR) 750 mg tablet Take 1 Tab by mouth daily. Indications: type 2 diabetes mellitus 90 Tab 4    senna-docusate (PERICOLACE) 8.6-50 mg per tablet 1-2 tabs daily as needed for constipation 30 Tab 0    promethazine (PHENERGAN) 25 mg tablet Take 1 Tab by mouth every six (6) hours as needed for Nausea. Indications: nausea 60 Tab 5    atenolol (TENORMIN) 25 mg tablet Take 1 Tab by mouth daily. Indications: hypertension 90 Tab 5    LORazepam (ATIVAN) 1 mg tablet TAKE 1 TABLET BY MOUTH TWICE DAILY AS NEEDED FOR ANXIETY 60 Tab 5    methylphenidate (RITALIN) 10 mg tablet Take 1 Tab by mouth three (3) times daily.  (Patient taking differently: Take 10 mg by mouth three (3) times daily as needed.) 90 Tab 0    NEXIUM 40 mg capsule TK 1 C PO QD AT 3PM  11    OTHER DDS Lumbar Traction Belt  Use as directed 1 Device prn       ALLERGIES    Allergies   Allergen Reactions    Zanaflex [Tizanidine] Other (comments)     Nausea and worsening headache SOCIAL HISTORY    Social History     Social History    Marital status:      Spouse name: N/A    Number of children: N/A    Years of education: N/A     Occupational History    retired, , ,       Social History Main Topics    Smoking status: Never Smoker    Smokeless tobacco: Never Used    Alcohol use No    Drug use: No    Sexual activity: Not Currently     Other Topics Concern    Not on file     Social History Narrative       FAMILY HISTORY    Family History   Problem Relation Age of Onset    Alcohol abuse Father     Diabetes Sister     Migraines Daughter     Diabetes Maternal Aunt     Cancer Maternal Aunt     Diabetes Maternal Uncle     Diabetes Paternal Aunt     Diabetes Paternal Uncle     Heart Attack Maternal Grandmother          REVIEW OF SYSTEMS  Review of Systems   Constitutional: Positive for diaphoresis and malaise/fatigue. Negative for chills, fever and weight loss. Respiratory: Negative for shortness of breath. Cardiovascular: Negative for chest pain. Gastrointestinal: Negative for constipation. Negative for fecal incontinence   Genitourinary: Negative for dysuria. Negative for urinary incontinence   Musculoskeletal: Positive for back pain, joint pain, myalgias and neck pain. Per HPI   Skin: Negative for rash. Neurological: Positive for tremors, sensory change, seizures, weakness and headaches. Negative for dizziness, tingling and focal weakness. Endo/Heme/Allergies: Does not bruise/bleed easily. Psychiatric/Behavioral: The patient does not have insomnia. PHYSICAL EXAMINATION  Visit Vitals    /85    Pulse (!) 115    Temp 99.2 °F (37.3 °C)    Resp 22    Ht 5' 1\" (1.549 m)    Wt 164 lb 9.6 oz (74.7 kg)    SpO2 95%    BMI 31.1 kg/m2          Accompanied by family member. Constitutional:  Well developed, well nourished. Mildly distressed.    Psychiatric: Anxious  Integumentary: No rashes or abrasions noted on exposed areas. Cardiovascular/Peripheral Vascular: Intact l pulses. No peripheral edema is noted. Lymphatic:  No evidence of lymphedema. No cervical lymphadenopathy. SPINE/MUSCULOSKELETAL EXAM        Lumbar spine:  No rash, ecchymosis, or gross obliquity. No fasciculations. No focal atrophy is noted. Tenderness to light palpation L4-5, B/L sciatic notch, B/L trochanteric bursae. SI joints tender. MOTOR:       Hip Flex  Quads Hamstrings Ankle DF EHL Ankle PF   Right +4/5 +4/5 +4/5 +4/5 +4/5 +4/5   Left +4/5 +4/5 +4/5 +4/5 +4/5 +4/5     Straight Leg raise negative. Hyperalgesia. DTRs B/L 1+ LEs  Ambulation without assistive device. FWB. LE tremors with standing. FF. Written by Bakari Marlow, as dictated by Rosa Whitley MD.    I, Dr. Rosa Whitley MD, confirm that all documentation is accurate. Ms. Sheila Martinez may have a reminder for a \"due or due soon\" health maintenance. I have asked that she contact her primary care provider for follow-up on this health maintenance.

## 2018-08-22 NOTE — PROGRESS NOTES
Verbal order entered per Dr. Apurva Nix as documented on blue sheet:  -mri lumbar, lower back pain, bilateral lower extremities/failed physical therapy/meds/biofeedback  -list of pain management

## 2018-08-22 NOTE — PATIENT INSTRUCTIONS
Spondylolysis and Spondylolisthesis: Exercises  Your Care Instructions  Here are some examples of typical rehabilitation exercises for your condition. Start each exercise slowly. Ease off the exercise if you start to have pain. Your doctor or physical therapist will tell you when you can start these exercises and which ones will work best for you. How to do the exercises  Single knee-to-chest    1. Lie on your back with your knees bent and your feet flat on the floor. You can put a small pillow under your head and neck if it is more comfortable. 2. Bring one knee to your chest, keeping the other foot flat on the floor. 3. Keep your lower back pressed to the floor. Hold for 15 to 30 seconds. 4. Relax, and lower the knee to the starting position. 5. Repeat with the other leg. Repeat 2 to 4 times with each leg. 6. To get more stretch, put your other leg flat on the floor while pulling your knee to your chest.  Double knee-to-chest    1. Lie on your back with your knees bent and your feet flat on the floor. You can put a small pillow under your head and neck if it is more comfortable. 2. Bring both knees to your chest.  3. Keep your lower back pressed to the floor. Hold for 15 to 30 seconds. 4. Relax, and lower your knees to the starting position. 5. Repeat 2 to 4 times. Alternate arm and leg (bird dog) exercise    Do this exercise slowly. Try to keep your body straight at all times. 1. Start on the floor, on your hands and knees. 2. Tighten your belly muscles by pulling your belly button in toward your spine. Be sure you continue to breathe normally and do not hold your breath. 3. Raise one arm off the floor, and hold it straight out in front of you. Be careful not to let your shoulder drop down, because that will twist your trunk. 4. Hold for about 6 seconds, then lower your arm and switch to your other arm. 5. Repeat 8 to 12 times on each arm.   6. When you can do this exercise with ease and no pain, repeat steps 1 through 5. But this time do it with one leg raised off the floor, holding your leg straight out behind you. Be careful not to let your hip drop down, because that will twist your trunk. 7. When holding your leg straight out becomes easier, try raising your opposite arm at the same time, and repeat steps 1 through 5. Bridging    1. Lie on your back with both knees bent. Your knees should be bent about 90 degrees. 2. Then push your feet into the floor, squeeze your buttocks, and lift your hips off the floor until your shoulders, hips, and knees are all in a straight line. 3. Hold for about 6 seconds as you continue to breathe normally, and then slowly lower your hips back down to the floor and rest for up to 10 seconds. 4. Repeat 8 to 12 times. Curl-ups    1. Lie on the floor on your back with your knees bent at a 90-degree angle. Your feet should be flat on the floor, about 12 inches from your buttocks. 2. Cross your arms over your chest. If this bothers your neck, try putting your hands behind your neck (not your head), with your elbows spread apart. 3. Slowly tighten your belly muscles and raise your shoulder blades off the floor. 4. Keep your head in line with your body, and do not press your chin to your chest.  5. Hold this position for 1 or 2 seconds, then slowly lower yourself back down to the floor. 6. Repeat 8 to 12 times. Plank    Do this exercise slowly. Try to keep your body straight at all times, and do not let one hip drop lower than the other. 1. Lie on your stomach, resting your upper body on your forearms. 2. Tighten your belly muscles by pulling your belly button in toward your spine. 3. Keeping your knees on the floor, press down with your forearms to lift your upper body off the floor. 4. Hold for about 6 seconds, then lower your body to the floor. Rest for up to 10 seconds. 5. Repeat 8 to 12 times.   6. Over time, work up to holding for 15 to 30 seconds each time.  7. If this exercise is easy to do with your knees on the floor, try doing this exercise with your knees and legs straight, supported by your toes on the floor. Follow-up care is a key part of your treatment and safety. Be sure to make and go to all appointments, and call your doctor if you are having problems. It's also a good idea to know your test results and keep a list of the medicines you take. Where can you learn more? Go to http://mary-consuelo.info/. Enter [phone] in the search box to learn more about \"Spondylolysis and Spondylolisthesis: Exercises. \"  Current as of: November 29, 2017  Content Version: 11.7  © 3654-8681 [employer]. Care instructions adapted under license by [employer] (which disclaims liability or warranty for this information). If you have questions about a medical condition or this instruction, always ask your healthcare professional. [place] [address] any warranty or liability for your use of this information.

## 2018-08-25 ENCOUNTER — HOSPITAL ENCOUNTER (OUTPATIENT)
Dept: MRI IMAGING | Age: 58
Discharge: HOME | End: 2018-08-25
Attending: PHYSICAL MEDICINE & REHABILITATION
Payer: MEDICARE

## 2018-08-25 DIAGNOSIS — M43.16 SPONDYLOLISTHESIS OF LUMBAR REGION: ICD-10-CM

## 2018-08-25 DIAGNOSIS — M54.31 BILATERAL SCIATICA: ICD-10-CM

## 2018-08-25 DIAGNOSIS — M54.32 BILATERAL SCIATICA: ICD-10-CM

## 2018-08-25 PROCEDURE — 72148 MRI LUMBAR SPINE W/O DYE: CPT

## 2018-08-30 ENCOUNTER — OFFICE VISIT (OUTPATIENT)
Dept: PAIN MANAGEMENT | Age: 58
End: 2018-08-30

## 2018-08-30 VITALS
HEART RATE: 126 BPM | WEIGHT: 164 LBS | DIASTOLIC BLOOD PRESSURE: 86 MMHG | BODY MASS INDEX: 30.96 KG/M2 | SYSTOLIC BLOOD PRESSURE: 156 MMHG | HEIGHT: 61 IN | RESPIRATION RATE: 16 BRPM | TEMPERATURE: 99.4 F

## 2018-08-30 DIAGNOSIS — M51.37 DEGENERATION OF LUMBAR OR LUMBOSACRAL INTERVERTEBRAL DISC: ICD-10-CM

## 2018-08-30 DIAGNOSIS — M47.817 SPONDYLOSIS OF LUMBOSACRAL REGION, UNSPECIFIED SPINAL OSTEOARTHRITIS COMPLICATION STATUS: Primary | ICD-10-CM

## 2018-08-30 DIAGNOSIS — M54.81 BILATERAL OCCIPITAL NEURALGIA: ICD-10-CM

## 2018-08-30 DIAGNOSIS — M70.62 TROCHANTERIC BURSITIS OF BOTH HIPS: ICD-10-CM

## 2018-08-30 DIAGNOSIS — M70.61 TROCHANTERIC BURSITIS OF BOTH HIPS: ICD-10-CM

## 2018-08-30 DIAGNOSIS — Z79.899 ENCOUNTER FOR LONG-TERM (CURRENT) USE OF HIGH-RISK MEDICATION: ICD-10-CM

## 2018-08-30 DIAGNOSIS — M21.70 LOWER LIMB LENGTH DIFFERENCE: ICD-10-CM

## 2018-08-30 DIAGNOSIS — R29.3 ABNORMAL POSTURE: ICD-10-CM

## 2018-08-30 DIAGNOSIS — G57.03 PIRIFORMIS SYNDROME OF BOTH SIDES: ICD-10-CM

## 2018-08-30 RX ORDER — METHADONE HYDROCHLORIDE 10 MG/1
TABLET ORAL
Qty: 120 TAB | Refills: 0 | Status: SHIPPED | OUTPATIENT
Start: 2018-09-01 | End: 2018-10-04 | Stop reason: SDUPTHER

## 2018-08-30 NOTE — PATIENT INSTRUCTIONS
Piriformis Syndrome: Exercises Your Care Instructions Here are some examples of typical rehabilitation exercises for your condition. Start each exercise slowly. Ease off the exercise if you start to have pain. Your doctor or physical therapist will tell you when you can start these exercises and which ones will work best for you. How to do the exercises Hip rotator stretch 1. Lie on your back with both knees bent and your feet flat on the floor. 2. Put the ankle of your affected leg on your opposite thigh near your knee. 3. Use your hand to gently push your knee (on your affected leg) away from your body until you feel a gentle stretch around your hip. 4. Hold the stretch for 15 to 30 seconds. 5. Repeat 2 to 4 times. 6. Switch legs and repeat steps 1 through 5. Piriformis stretch 1. Lie on your back with your legs straight. 2. Lift your affected leg and bend your knee. With your opposite hand, reach across your body, and then gently pull your knee toward your opposite shoulder. 3. Hold the stretch for 15 to 30 seconds. 4. Repeat with your other leg. 5. Repeat 2 to 4 times on each side. Lower abdominal strengthening 1. Lie on your back with your knees bent and your feet flat on the floor. 2. Tighten your belly muscles by pulling your belly button in toward your spine. 3. Lift one foot off the floor and bring your knee toward your chest, so that your knee is straight above your hip and your leg is bent like the letter \"L. \" 
4. Lift the other knee up to the same position. 5. Lower one leg at a time to the starting position. 6. Keep alternating legs until you have lifted each leg 8 to 12 times. 7. Be sure to keep your belly muscles tight and your back still as you are moving your legs. Be sure to breathe normally. Follow-up care is a key part of your treatment and safety.  Be sure to make and go to all appointments, and call your doctor if you are having problems. It's also a good idea to know your test results and keep a list of the medicines you take. Where can you learn more? Go to http://mary-consuelo.info/. Enter W045 in the search box to learn more about \"Piriformis Syndrome: Exercises. \" Current as of: November 29, 2017 Content Version: 11.7 © 6600-1949 [employer]. Care instructions adapted under license by [employer] (which disclaims liability or warranty for this information). If you have questions about a medical condition or this instruction, always ask your healthcare professional. [place] [address] any warranty or liability for your use of this information.

## 2018-08-30 NOTE — PROGRESS NOTES
Nursing Notes Patient presents to the office today in follow-up. Patient rates her pain at 6/10 on the numerical pain scale. Reviewed medications with counts as follows:   
Rx Date filled Qty Dispensed Pill Count Last Dose Short Methadone 10 mg  08/14/18 150 80 today no POC UDS was not performed in office today. Any new labs or imaging since last appointment? YES. Pt states that she had an MRI of her back and head. The back was ordered by JUAN ANTONIO and the head was ordered by neurology. She also had the EKG done that was ordered by our office. Have you been to an emergency room (ER) or urgent care clinic since your last visit? NO Have you been hospitalized since your last visit? NO If yes, where, when, and reason for visit? Have you seen or consulted any other health care providers outside of the [facility]  since your last visit? YES If yes, where, when, and reason for visit? Neurology and orthopedic Ms. Sumi Mckinley has a reminder for a \"due or due soon\" health maintenance. I have asked that she contact her primary care provider for follow-up on this health maintenance. PHQ over the last two weeks 8/30/2018 PHQ Not Done - Little interest or pleasure in doing things Not at all Feeling down, depressed, irritable, or hopeless Not at all Total Score PHQ 2 0

## 2018-08-30 NOTE — MR AVS SNAPSHOT
[address] [facility] [address] 
[phone] Patient: Nereyda Costello MRN: [id number] Visit Information Date & Time Provider Department Dept. Phone Encounter #  
 8/30/2018 10:10 AM Yoshi Mosquera, [facility] [id number] Follow-up Instructions Return in about 1 month (around 9/30/2018). Your Appointments 9/5/2018  3:10 PM  
DIAG TEST F/U with Radha Hamm MD  
[facility] ([facility]) Appt Note: MRI WILL Tobias Lewandowsky DISK  
 [address] [facility] [address]  
[phone]  
  
   
 [address] [place] [address]  
  
    
 11/12/2018  2:00 PM  
Office Visit with Talya Mcmahan MD  
[facility] Appt Note: 4 month f/u combo clinic, chol  
 [address]  
  
   
 [address] [facility] Upcoming Health Maintenance Date Due COLONOSCOPY 1/20/2017 EYE EXAM RETINAL OR DILATED Q1 3/14/2018 Influenza Age 5 to Adult 8/1/2018 Pneumococcal 19-64 Medium Risk (1 of 1 - PPSV23) 10/1/2018* DTaP/Tdap/Td series (1 - Tdap) 12/26/2018* ZOSTER VACCINE AGE 60> 4/4/2019* MEDICARE YEARLY EXAM 12/27/2018 HEMOGLOBIN A1C Q6M 1/9/2019 BREAST CANCER SCRN MAMMOGRAM 1/17/2019 FOOT EXAM Q1 7/9/2019 MICROALBUMIN Q1 7/9/2019 LIPID PANEL Q1 7/9/2019 PAP AKA CERVICAL CYTOLOGY 12/26/2020 *Topic was postponed. The date shown is not the original due date. Allergies as of 8/30/2018  Review Complete On: 8/30/2018 By: Yoshi Mosquera DO Severity Noted Reaction Type Reactions Zanaflex [Tizanidine]  04/18/2016   Side Effect Other (comments) Nausea and worsening headache Current Immunizations  Reviewed on 4/3/2018 Name Date Influenza Vaccine 9/30/2015  2:50 PM  
 Influenza Vaccine (Quad) PF 12/26/2017, 12/15/2016 Pneumococcal Conjugate (PCV-13) 12/15/2016 Not reviewed this visit You Were Diagnosed With   
  
 Codes Comments Spondylosis of lumbosacral region, unspecified spinal osteoarthritis complication status    -  Primary ICD-10-CM: K57.763 ICD-9-CM: 721.3 Encounter for long-term (current) use of high-risk medication     ICD-10-CM: Z79.899 ICD-9-CM: V58.69 Trochanteric bursitis of both hips     ICD-10-CM: M70.61, M70.62 ICD-9-CM: 726.5 Bilateral occipital neuralgia     ICD-10-CM: M54.81 ICD-9-CM: 723.8 Abnormal posture     ICD-10-CM: R29.3 ICD-9-CM: 781.92 Lower limb length difference     ICD-10-CM: M21.70 ICD-9-CM: 736.81 Piriformis syndrome of both sides     ICD-10-CM: G57.01, G57.02 
ICD-9-CM: 355.0 Degeneration of lumbar or lumbosacral intervertebral disc     ICD-10-CM: M51.37 
ICD-9-CM: 722.52 Vitals BP Pulse Temp Resp Height(growth percentile) Weight(growth percentile) 156/86 (BP 1 Location: Right arm, BP Patient Position: Sitting) (!) 126 99.4 °F (37.4 °C) (Oral) 16 5' 1\" (1.549 m) 164 lb (74.4 kg) BMI OB Status Smoking Status 30.99 kg/m2 Hysterectomy Never Smoker Vitals History BMI and BSA Data Body Mass Index Body Surface Area 30.99 kg/m 2 1.79 m 2 Preferred Pharmacy Pharmacy Name Phone [facility], [address] [place] [address]  [phone] Your Updated Medication List  
  
   
This list is accurate as of 8/30/18 11:19 AM.  Always use your most recent med list.  
  
  
  
  
 atenolol 25 mg tablet Commonly known as:  TENORMIN Take 1 Tab by mouth daily. Indications: hypertension  
  
 docusate sodium 100 mg capsule Commonly known as:  Ogl Bicker Take 1 Cap by mouth two (2) times daily as needed for Constipation for up to 90 days. lidocaine 5 % Commonly known as:  Adam Hayes Apply one patch and leave on 12 hours then remove for 12 hours. Indications: pain LORazepam 1 mg tablet Commonly known as:  ATIVAN  
TAKE 1 TABLET BY MOUTH TWICE DAILY AS NEEDED FOR ANXIETY  
  
 metFORMIN  mg tablet Commonly known as:  GLUCOPHAGE XR Take 1 Tab by mouth daily. Indications: type 2 diabetes mellitus  
  
 methadone 10 mg tablet Commonly known as:  DOLOPHINE  
1 tablet 4 times daily. Indications: Chronic Pain Start taking on:  2018  
  
 methylphenidate HCl 10 mg tablet Commonly known as:  RITALIN Take 1 Tab by mouth three (3) times daily. NexIUM 40 mg capsule Generic drug:  esomeprazole TK 1 C PO QD AT 3PM  
  
 OTHER  
DDS Lumbar Traction Belt Use as directed  
  
 promethazine 25 mg tablet Commonly known as:  PHENERGAN Take 1 Tab by mouth every six (6) hours as needed for Nausea. Indications: nausea  
  
 senna-docusate 8.6-50 mg per tablet Commonly known as:  PERICOLACE  
1-2 tabs daily as needed for constipation  
  
 simvastatin 40 mg tablet Commonly known as:  ZOCOR  
TAKE 1 TABLET BY MOUTH EVERY NIGHT AT BEDTIME  Indications: hypercholesterolemia TENS Units [facility] Commonly known as:  TENS 504 Please provide patient with a TENS unit to help improve function, improve quality of life, reduce medication use, improve ROM. Multifactorial complex widespread chronic pain with contribution from lumbosacral spondylosis, bilateral greater trochanteric bursitis, bilateral piriformis syndrome, post  
  
 topiramate 50 mg tablet Commonly known as:  TOPAMAX Take 3 Tabs by mouth two (2) times a day. Indications: MIGRAINE PREVENTION, seizure Prescriptions Printed Refills  
 methadone (DOLOPHINE) 10 mg tablet 0 Starting on: 2018 Si tablet 4 times daily. Indications: Chronic Pain Class: Print  TENS Units (TENS 504) dean 0  
 Sig: Please provide patient with a TENS unit to help improve function, improve quality of life, reduce medication use, improve ROM. Multifactorial complex widespread chronic pain with contribution from lumbosacral spondylosis, bilateral greater trochanteric bursitis, bilateral piriformis syndrome, post  
 Class: Print We Performed the Following REFERRAL TO PSYCHOLOGY [OBU79 Custom] Comments:  
 Referral for pain psychology specifically. 68-year-old female with 5 widespread complex multifactorial chronic pain with history of depression history of OCD. Please evaluate and treat to include training and cognitive behavioral therapy, acceptance commitment therapy, ongoing training and biofeedback, mindfulness meditation and other modalities as indicated with the goal to help patient be able to better self manage her pain and improve her coping mechanisms. Thank you Follow-up Instructions Return in about 1 month (around 9/30/2018). Referral Information Referral ID Referred By Referred To  
  
 [id number] Sravani MUNIZ Not Available Visits Status Start Date End Date 1 New Request 8/30/18 8/30/19 If your referral has a status of pending review or denied, additional information will be sent to support the outcome of this decision. Patient Instructions Piriformis Syndrome: Exercises Your Care Instructions Here are some examples of typical rehabilitation exercises for your condition. Start each exercise slowly. Ease off the exercise if you start to have pain. Your doctor or physical therapist will tell you when you can start these exercises and which ones will work best for you. How to do the exercises Hip rotator stretch 1. Lie on your back with both knees bent and your feet flat on the floor. 2. Put the ankle of your affected leg on your opposite thigh near your knee.  
3. Use your hand to gently push your knee (on your affected leg) away from your body until you feel a gentle stretch around your hip. 4. Hold the stretch for 15 to 30 seconds. 5. Repeat 2 to 4 times. 6. Switch legs and repeat steps 1 through 5. Piriformis stretch 1. Lie on your back with your legs straight. 2. Lift your affected leg and bend your knee. With your opposite hand, reach across your body, and then gently pull your knee toward your opposite shoulder. 3. Hold the stretch for 15 to 30 seconds. 4. Repeat with your other leg. 5. Repeat 2 to 4 times on each side. Lower abdominal strengthening 1. Lie on your back with your knees bent and your feet flat on the floor. 2. Tighten your belly muscles by pulling your belly button in toward your spine. 3. Lift one foot off the floor and bring your knee toward your chest, so that your knee is straight above your hip and your leg is bent like the letter \"L. \" 
4. Lift the other knee up to the same position. 5. Lower one leg at a time to the starting position. 6. Keep alternating legs until you have lifted each leg 8 to 12 times. 7. Be sure to keep your belly muscles tight and your back still as you are moving your legs. Be sure to breathe normally. Follow-up care is a key part of your treatment and safety. Be sure to make and go to all appointments, and call your doctor if you are having problems. It's also a good idea to know your test results and keep a list of the medicines you take. Where can you learn more? Go to http://mary-consuelo.info/. Enter W286 in the search box to learn more about \"Piriformis Syndrome: Exercises. \" Current as of: November 29, 2017 Content Version: 11.7 © 1122-0187 [employer]. Care instructions adapted under license by [employer] (which disclaims liability or warranty for this information).  If you have questions about a medical condition or this instruction, always ask your healthcare professional. [employer] disclaims any warranty or liability for your use of this information. Introducing [facility]! Esther Proctor introduces Rambus patient portal. Now you can access parts of your medical record, email your doctor's office, and request medication refills online. 1. In your internet browser, go to [email or web address] 2. Click on the First Time User? Click Here link in the Sign In box. You will see the New Member Sign Up page. 3. Enter your Rambus Access Code exactly as it appears below. You will not need to use this code after youve completed the sign-up process. If you do not sign up before the expiration date, you must request a new code. · Rambus Access Code: [email or web address] Expires: 10/31/2018  1:12 PM 
 
4. Enter the last four digits of your Social Security Number (xxxx) and Date of Birth (mm/dd/yyyy) as indicated and click Submit. You will be taken to the next sign-up page. 5. Create a Rambus ID. This will be your Rambus login ID and cannot be changed, so think of one that is secure and easy to remember. 6. Create a Rambus password. You can change your password at any time. 7. Enter your Password Reset Question and Answer. This can be used at a later time if you forget your password. 8. Enter your e-mail address. You will receive e-mail notification when new information is available in [address] 9. Click Sign Up. You can now view and download portions of your medical record. 10. Click the Download Summary menu link to download a portable copy of your medical information. If you have questions, please visit the Frequently Asked Questions section of the Rambus website. Remember, Rambus is NOT to be used for urgent needs. For medical emergencies, dial 911. Now available from your iPhone and Android! Please provide this summary of care documentation to your next provider. Your primary care clinician is listed as [facility]. If you have any questions after today's visit, please call [phone].

## 2018-08-30 NOTE — PROGRESS NOTES
Referral date 2013, source multidisciplinary outpatient pain control and question chronic widespread pain and headaches. Social History Social History  Marital status:  Spouse name: N/A  
 Number of children: N/A  
 Years of education: N/A Occupational History  retired, , ,  History Main Topics  Smoking status: Never Smoker  Smokeless tobacco: Never Used  Alcohol use No  
 Drug use: No  
 Sexual activity: Not Currently Other Topics Concern  Not on file Social History Narrative Family History Problem Relation Age of Onset  Alcohol abuse Father  Diabetes Sister  Migraines Daughter  Diabetes Maternal Aunt  Cancer Maternal Aunt  Diabetes Maternal Uncle  Diabetes Paternal Aunt  Diabetes Paternal Uncle  Heart Attack Maternal Grandmother Allergies Allergen Reactions  Zanaflex [Tizanidine] Other (comments) Nausea and worsening headache Past Medical History:  
Diagnosis Date  Anxiety  Cataract 6/19/15  
 bilat  Chronic sialoadenitis  Degenerative disc disease  Depression  Diabetes ([facility] [address]   Dry eye  Dyslipidemia  Fatigue   
 uses ritalin for this  Fibromyalgia  GERD (gastroesophageal reflux disease)  Headache(784.0)   
 daily, all her life  Hypertension  OCD (obsessive compulsive disorder)  PTSD (post-traumatic stress disorder)  Seizure disorder ([facility] [address]   Thyroid dysfunction  TMJ (temporomandibular joint disorder) right  Viral encephalitis   
 unknown type from mosquito Past Surgical History:  
Procedure Laterality Date  HX  SECTION    
 X 2  
 HX COLONOSCOPY    
 ? f/u  
 HX PARTIAL HYSTERECTOMY Current Outpatient Prescriptions on File Prior to Visit Medication Sig  
 docusate sodium (COLACE) 100 mg capsule Take 1 Cap by mouth two (2) times daily as needed for Constipation for up to 90 days.  simvastatin (ZOCOR) 40 mg tablet TAKE 1 TABLET BY MOUTH EVERY NIGHT AT BEDTIME  Indications: hypercholesterolemia  topiramate (TOPAMAX) 50 mg tablet Take 3 Tabs by mouth two (2) times a day. Indications: MIGRAINE PREVENTION, seizure  metFORMIN ER (GLUCOPHAGE XR) 750 mg tablet Take 1 Tab by mouth daily. Indications: type 2 diabetes mellitus  senna-docusate (PERICOLACE) 8.6-50 mg per tablet 1-2 tabs daily as needed for constipation  promethazine (PHENERGAN) 25 mg tablet Take 1 Tab by mouth every six (6) hours as needed for Nausea. Indications: nausea  atenolol (TENORMIN) 25 mg tablet Take 1 Tab by mouth daily. Indications: hypertension  LORazepam (ATIVAN) 1 mg tablet TAKE 1 TABLET BY MOUTH TWICE DAILY AS NEEDED FOR ANXIETY  methylphenidate (RITALIN) 10 mg tablet Take 1 Tab by mouth three (3) times daily. (Patient taking differently: Take 10 mg by mouth three (3) times daily as needed.)  NEXIUM 40 mg capsule TK 1 C PO QD AT 3PM  
 OTHER DDS Lumbar Traction Belt Use as directed  lidocaine (LIDODERM) 5 % Apply one patch and leave on 12 hours then remove for 12 hours. Indications: pain No current facility-administered medications on file prior to visit. HPI: 
Yasmany Contreras is a 59 y.o. female here for f/u visit for ongoing evaluation of bilateral hip pain, suboccipital region pain, lumbosacral pain. . Pt was last seen here on June 25, 2018. Pt denies interval changes in the character or distribution of pain of a she is reporting increased intensity. Pain is located bilateral sacroiliac joint regions and lumbosacral region, bilateral lateral hip regions over the trochanters, bilateral gluteal regions. The pain is mostly described as aching to stabbing pain that ranges from 5-9/10. She has not been doing piriformis muscle stretches that were demonstrated previously. She was scheduled for greater occipital nerve block but no showed for that appointment. She has not been participating or investigating yoga for people with chronic pain. LSO was tried but she said she there was too much pressure over the trochanter region. But it did help the back feel a little bit better. [id number] 150mg bid from PCP but she tell if it is helpful for pain or not. Helping with migraine. lidoderm working well Ativan Methadone 10mg 5Xs/d gives partial but incomplete pain relief allowing her to have improved activity tolerance. She denies adverse effects or aberrant behaviors with use of the methadone. At previous visit she was instructed in postural improvement exercises for her bilaterally rounded shoulders and forward head posture that is likely contributing to her neck and shoulder region pain but she has not been working on the posture. She has had neurology follow-up in the interim for her chronic headaches for which she reports an MRI and an EEG was done but no treatment options were offered from the neurologist for her headaches. She has had physiatry  follow-up in the interim but not a spine surgeon follow-up which was requested. ROS: Negative for fever, chills, vomiting, diarrhea, chest pain, shortness of breath, weakness, trouble swallowing, acute changes in vision, acute changes in hearing, falls, dizziness, bladder incontinence, bowel incontinence, depression, suicidal ideation, homicidal ideation, alcohol use. Review of systems positive for nausea, constipation, abdominal pain, anxiety. Opioid specific risk:Opioid specific risk: 
Concurrent benzodiazepine use, depression, family history of suicide, family history of substance use disorder, PTSD, depression, did not  the Narcan nasal spray which was previously ordered, diabetes, anxiety. Seizure disorder Opioid specific history: Maintained for greater than 21 years on opioids without breaks. Vitals:  
 08/30/18 1011 BP: 156/86 Pulse: (!) 126 Resp: 16 Temp: 99.4 °F (37.4 °C) TempSrc: Oral  
Weight: 74.4 kg (164 lb) Height: 5' 1\" (1.549 m) PainSc:   6 PainLoc: Back Imaging: Report from MRI of the lumbar spine doneAugust 25, 2018 was reviewed with the patient, \"\"\"\"\"\"\"\"\"\"\"\"\"\"\"\"FINDINGS:  
  
Mild anterior spondylolisthesis L5/S1, slight retrolisthesis L2/3. No evidence 
of fracture or spondylolysis. Mild asymmetric left-sided degenerative endplate 
marrow edema U9/3. No other marrow edema or neoplastic marrow signal. The conus 
medullaris is located at the L1 level and has a normal appearance and signal.  
  
Visualized upper sacrum and lower thoracic levels and retroperitoneum 
unremarkable. 
  
L1/2 level: Unremarkable. There is no central or foraminal stenosis. 
  
L2/3 level: Small broad right posterior disc protrusion with disc bulge and mild 
degenerative facet changes, no significant stenosis. 
  
L3/4 level: Disc desiccation with mild nonimpinging disc bulge with minimal 
degenerative facet changes. There is no central or foraminal stenosis. 
  
L4/5 level: Minimal degenerative disc changes and mild facet arthropathy. There 
is no central or foraminal stenosis. 
  
L5/S1 level: Moderate disc space narrowing with degenerative spondylosis with 
moderate facet arthropathy right greater than left. There is no central or 
foraminal stenosis IMPRESSION IMPRESSION: 
  
1. No evidence of high-grade stenosis or impingement to suggest etiology of 
lower extremity radiculopathy. 2. Disc bulge with small broad right posterior disc protrusion L2/3 with 
asymmetric left-sided degenerative endplate marrow edema, no significant 
stenosis. 3. Facet arthropathy L5/S1 with mild anterior spondylolisthesis, no significant 
stenosis. 
 \"\"\"\"\"\"\"\"\"\"\"\"\"\"\"\"\"\" EKG: Report reviewed from EKG done On July 20, 2018 showed QT interval of 366 ms.  
 
 
PE: 
 AFVSS, no acute distress, overweight. A&OXs 3. 
normocephalic, atraumatic. Conjugate gaze, clear sclerae. Pt is tearful but cooperative. Speech is clear and appropriate. Calculated MEQ -270 at initiation of wean Naloxone rescue -yes Prophylactic bowel program -yes Date of last OCA January 29, 2018. Last UDS June 25, 2018, consistent , date checked today, findings consistent. [phone] 
[phone] GIC-1 and 10 PRINCE -62% COMM- incomplete PHQ -- . PHQ over the last two weeks 8/30/2018 PHQ Not Done - Little interest or pleasure in doing things Not at all Feeling down, depressed, irritable, or hopeless Not at all Total Score PHQ 2 0  
 
DSM V-OUD Screen--mild to moderate Assessment/Plan: ICD-10-CM ICD-9-CM 1. Spondylosis of lumbosacral region, unspecified spinal osteoarthritis complication status Y64.829 721.3 TENS Units (TENS 504) dean REFERRAL TO PSYCHOLOGY 2. Encounter for long-term (current) use of high-risk medication Z79.899 V58.69 3. Trochanteric bursitis of both hips M70.61 726.5 methadone (DOLOPHINE) 10 mg tablet  
 M70.62  TENS Units (TENS 504) dean REFERRAL TO PSYCHOLOGY 4. Bilateral occipital neuralgia M54.81 723.8 methadone (DOLOPHINE) 10 mg tablet REFERRAL TO PSYCHOLOGY 5. Abnormal posture R29.3 781.92   
6. Lower limb length difference M21.70 736.81   
7. Piriformis syndrome of both sides G57.01 355.0 TENS Units (TENS 504) edan  
 G57.02  REFERRAL TO PSYCHOLOGY 8. Degeneration of lumbar or lumbosacral intervertebral disc M51.37 722.52 methadone (DOLOPHINE) 10 mg tablet TENS Units (TENS 504) dean REFERRAL TO PSYCHOLOGY  
  
--Follow-up with Dr. Faby Hassan at his next available appointment. This will be for evaluation for potential opioid use disorder and discussion of options as appropriate. --Patient would likely benefit from intermittent bilateral greater occipital nerve blocks, piriformis injections, greater trochanteric bursa injections. These options were offered patient does not want to do it at this time. --Continue follow-up with PCP, spine team, neurology 
--multiple medication options were discussed to include NSAIDs, Tylenol, TCAs, Lyrica, gabapentin and others. Patient still not currently wanting to try any of these options. --Discussed ongoing use of topical agents such as compounded creams, doxepin cream, menthol, capsaicin. --Continue Lidoderm patches --Start trial of topical capsaicin cream.  If this is mildly effective for her we may consider Qutenza patch in the future. - 
-=Patient referred to Pain psychology for evaluation treatment of her overall chronic pain issues and training and cognitive behavioral therapy, acceptance commitment therapy, mindfulness meditation and other modalities as indicated. This is intended to help her be able to self manage her chronic pain issues more effectively and improve and add coping strategies for chronic pain. 
--TENS unit was requested again. This could be beneficial for her in multiple body areas. --Discussion of spinal cord or intrathecal pain pump continued. These modalities could potentially provide significant improvement in her overall quality of life if she is willing to give it a try 
--Patient may benefit from a referral to a tertiary inpatient pain rehabilitation clinic. --I do not recommend long-term opioid therapy for this patient's chronic pain. We will continue to wean while development of the rest of the plan of care continues. The risks of maintaining her on long term opioids outweigh the potential benefits. There are numerous options available as outlined above. She was again educated on signs/symptoms of opioid withdrawal and assured that we will provide support if and when she needs it. --Patient will also likely benefit from physical therapy focused on treatment of fear avoidance and Kinesio phobia more so than specific musculoskeletal pathologies. She does not want to return to physical therapy at this time this will be an ongoing option for her. GOALS: 
To establish complementary and integrative plan of care to address chronic pain issues while minimizing pharmaceuticals to maximize patient's function improve quality of life. Education: 
Patient again educated on the importance of strict compliance with the opioid care agreement while on opioid therapy. Patient also again educated that they should avoid driving while on chronic opioid therapy. Also advised to avoid alcohol and to avoid benzodiazepines while on opioid therapy. Patient Homework: 
Continue to independently investigate yoga for persons with chronic pain. F/u:. Follow-up Disposition: 
Return in about 1 month (around 9/30/2018).

## 2018-09-05 ENCOUNTER — OFFICE VISIT (OUTPATIENT)
Dept: ORTHOPEDIC SURGERY | Age: 58
End: 2018-09-05

## 2018-09-05 VITALS
DIASTOLIC BLOOD PRESSURE: 83 MMHG | WEIGHT: 165 LBS | OXYGEN SATURATION: 99 % | SYSTOLIC BLOOD PRESSURE: 126 MMHG | HEART RATE: 111 BPM | HEIGHT: 61 IN | BODY MASS INDEX: 31.15 KG/M2 | RESPIRATION RATE: 21 BRPM | TEMPERATURE: 99 F

## 2018-09-05 DIAGNOSIS — M79.7 FIBROMYALGIA: ICD-10-CM

## 2018-09-05 DIAGNOSIS — M43.16 SPONDYLOLISTHESIS OF LUMBAR REGION: Primary | ICD-10-CM

## 2018-09-05 DIAGNOSIS — M54.31 BILATERAL SCIATICA: ICD-10-CM

## 2018-09-05 DIAGNOSIS — M54.32 BILATERAL SCIATICA: ICD-10-CM

## 2018-09-05 NOTE — MR AVS SNAPSHOT
[address] [facility]. [address] [facility] [address] 
[phone] Patient: Ginette Lees MRN: [id number] UJE:[id number] Visit Information Date & Time Provider Department Dept. Phone Encounter #  
 9/5/2018  3:10  [address],  [facility], [address] and [facility] [phone] [id number] Follow-up Instructions Return if symptoms worsen or fail to improve. Your Appointments 9/18/2018  2:50 PM  
ESTABLISHED PATIENT with Robin Riggins MD  
[facility] [address] for Pain Management [facility]) Appt Note: F/u with Dr Kailyn Gillette  At next available; re'd pt from 9/10/18 for f/u provider out of office. .to  
 [address] [facility] [address]  
[phone] [phone] [id number]  
  
    
 9/28/2018  1:00 PM  
Follow Up with Aldair Aguiar DO [address] [place] [address] for Pain Management (CLAUDE SCHEDULING) Appt Note: Return in about 1 month (around 9/30/2018 [address] [facility] [address]  
[phone]  
  
    
 11/12/2018  2:00 PM  
Office Visit with Milo Moraes MD  
[facility] ([address]) Appt Note: 4 month f/u combo clinic, chol  
 [address]  
  
   
 [address] [facility] Upcoming Health Maintenance Date Due COLONOSCOPY 1/20/2017 EYE EXAM RETINAL OR DILATED Q1 3/14/2018 Influenza Age 5 to Adult 8/1/2018 Pneumococcal 19-64 Medium Risk (1 of 1 - PPSV23) 10/1/2018* DTaP/Tdap/Td series (1 - Tdap) 12/26/2018* ZOSTER VACCINE AGE 60> 4/4/2019* MEDICARE YEARLY EXAM 12/27/2018 HEMOGLOBIN A1C Q6M 1/9/2019 BREAST CANCER SCRN MAMMOGRAM 1/17/2019 FOOT EXAM Q1 7/9/2019 MICROALBUMIN Q1 7/9/2019 LIPID PANEL Q1 7/9/2019 PAP AKA CERVICAL CYTOLOGY 12/26/2020 *Topic was postponed. The date shown is not the original due date. Allergies as of 9/5/2018  Review Complete On: 9/5/2018 By: Tera Zee MD  
  
 Severity Noted Reaction Type Reactions Zanaflex [Tizanidine]  04/18/2016   Side Effect Other (comments) Nausea and worsening headache Current Immunizations  Reviewed on 4/3/2018 Name Date Influenza Vaccine 9/30/2015  2:50 PM  
 Influenza Vaccine (Quad) PF 12/26/2017, 12/15/2016 Pneumococcal Conjugate (PCV-13) 12/15/2016 Not reviewed this visit You Were Diagnosed With   
  
 Codes Comments Spondylolisthesis of lumbar region    -  Primary ICD-10-CM: M43.16 
ICD-9-CM: 738.4 Bilateral sciatica     ICD-10-CM: M54.31, M54.32 
ICD-9-CM: 724.3 Fibromyalgia     ICD-10-CM: M79.7 ICD-9-CM: 729.1 Vitals BP Pulse Temp Resp Height(growth percentile) Weight(growth percentile) 126/83 (!) 111 99 °F (37.2 °C) 21 5' 1\" (1.549 m) 165 lb (74.8 kg) SpO2 BMI OB Status Smoking Status 99% 31.18 kg/m2 Hysterectomy Never Smoker BMI and BSA Data Body Mass Index Body Surface Area  
 31.18 kg/m 2 1.79 m 2 Preferred Pharmacy Pharmacy Name Phone [facility], [address] [place] [address]  [phone] Your Updated Medication List  
  
   
This list is accurate as of 9/5/18  5:25 PM.  Always use your most recent med list.  
  
  
  
  
 atenolol 25 mg tablet Commonly known as:  TENORMIN Take 1 Tab by mouth daily. Indications: hypertension  
  
 docusate sodium 100 mg capsule Commonly known as:  Rylee Donate Take 1 Cap by mouth two (2) times daily as needed for Constipation for up to 90 days. lidocaine 5 % Commonly known as:  Esha Messina Apply one patch and leave on 12 hours then remove for 12 hours. Indications: pain LORazepam 1 mg tablet Commonly known as:  ATIVAN  
 TAKE 1 TABLET BY MOUTH TWICE DAILY AS NEEDED FOR ANXIETY  
  
 metFORMIN  mg tablet Commonly known as:  GLUCOPHAGE XR Take 1 Tab by mouth daily. Indications: type 2 diabetes mellitus  
  
 methadone 10 mg tablet Commonly known as:  DOLOPHINE  
1 tablet 4 times daily. Indications: Chronic Pain  
  
 methylphenidate HCl 10 mg tablet Commonly known as:  RITALIN Take 1 Tab by mouth three (3) times daily. NexIUM 40 mg capsule Generic drug:  esomeprazole TK 1 C PO QD AT 3PM  
  
 OTHER  
DDS Lumbar Traction Belt Use as directed  
  
 promethazine 25 mg tablet Commonly known as:  PHENERGAN Take 1 Tab by mouth every six (6) hours as needed for Nausea. Indications: nausea  
  
 senna-docusate 8.6-50 mg per tablet Commonly known as:  PERICOLACE  
1-2 tabs daily as needed for constipation  
  
 simvastatin 40 mg tablet Commonly known as:  ZOCOR  
TAKE 1 TABLET BY MOUTH EVERY NIGHT AT BEDTIME  Indications: hypercholesterolemia TENS Units Antony Bustos Commonly known as:  TENS 504 Please provide patient with a TENS unit to help improve function, improve quality of life, reduce medication use, improve ROM. Multifactorial complex widespread chronic pain with contribution from lumbosacral spondylosis, bilateral greater trochanteric bursitis, bilateral piriformis syndrome, post  
  
 topiramate 50 mg tablet Commonly known as:  TOPAMAX Take 3 Tabs by mouth two (2) times a day. Indications: MIGRAINE PREVENTION, seizure Follow-up Instructions Return if symptoms worsen or fail to improve. Patient Instructions Fibromyalgia: Care Instructions Your Care Instructions Fibromyalgia is a painful condition that is not completely understood by medical experts. The cause of fibromyalgia is not known. It can make you feel tired and ache all over. It causes tender spots at specific points of the body that hurt only when you press on them.  You may have trouble sleeping, as well as other symptoms. These problems can upset your work and home life. Symptoms tend to come and go, although they may never go away completely. Fibromyalgia does not harm your muscles, joints, or organs. Follow-up care is a key part of your treatment and safety. Be sure to make and go to all appointments, and call your doctor if you are having problems. It's also a good idea to know your test results and keep a list of the medicines you take. How can you care for yourself at home? · Exercise often. Walk, swim, or bike to help with pain and sleep problems and to make you feel better. · Try to get a good night's sleep. Go to bed and get up at the same time each day, whether you feel rested or not. Make sure you have a good mattress and pillow. · Reduce stress. Avoid things that cause you stress, if you can. If not, work at making them less stressful. Learn to use biofeedback, guided imagery, meditation, or other methods to relax. · Make healthy changes. Eat a balanced diet, quit smoking, and limit alcohol and caffeine. · Use a heating pad set on low or take warm baths or showers for pain. Using cold packs for up to 20 minutes at a time can also relieve pain. Put a thin cloth between the cold pack and your skin. A gentle massage might help too. · Be safe with medicines. Take your medicines exactly as prescribed. Call your doctor if you think you are having a problem with your medicine. Your doctor may talk to you about taking antidepressant medicines. These medicines may improve sleep, relieve pain, and in some cases treat depression. · Learn about fibromyalgia. This makes coping easier. Then, take an active role in your treatment. · Think about joining a support group with others who have fibromyalgia to learn more and get support. When should you call for help? Watch closely for changes in your health, and be sure to contact your doctor if:   · You feel sad, helpless, or hopeless; lose interest in things you used to enjoy; or have other symptoms of depression.  
  · Your fibromyalgia symptoms get worse. Where can you learn more? Go to http://mary-consuelo.info/. Enter V003 in the search box to learn more about \"Fibromyalgia: Care Instructions. \" Current as of: October 9, 2017 Content Version: 11.7 © 4067-5306 [employer]. Care instructions adapted under license by [employer] (which disclaims liability or warranty for this information). If you have questions about a medical condition or this instruction, always ask your healthcare professional. [address] any warranty or liability for your use of this information. Introducing [facility]! [employer] introduces Inoapps patient portal. Now you can access parts of your medical record, email your doctor's office, and request medication refills online. 1. In your internet browser, go to [email or web address] 2. Click on the First Time User? Click Here link in the Sign In box. You will see the New Member Sign Up page. 3. Enter your Inoapps Access Code exactly as it appears below. You will not need to use this code after youve completed the sign-up process. If you do not sign up before the expiration date, you must request a new code. · Inoapps Access Code: [email or web address] Expires: 10/31/2018  1:12 PM 
 
4. Enter the last four digits of your Social Security Number (xxxx) and Date of Birth (mm/dd/yyyy) as indicated and click Submit. You will be taken to the next sign-up page. 5. Create a Inoapps ID. This will be your Inoapps login ID and cannot be changed, so think of one that is secure and easy to remember. 6. Create a Inoapps password. You can change your password at any time. 7. Enter your Password Reset Question and Answer. This can be used at a later time if you forget your password. 8. Enter your e-mail address. You will receive e-mail notification when new information is available in [address] 9. Click Sign Up. You can now view and download portions of your medical record. 10. Click the Download Summary menu link to download a portable copy of your medical information. If you have questions, please visit the Frequently Asked Questions section of the Symptify website. Remember, Symptify is NOT to be used for urgent needs. For medical emergencies, dial 911. Now available from your iPhone and Android! Please provide this summary of care documentation to your next provider. Your primary care clinician is listed as [facility]. If you have any questions after today's visit, please call [phone].

## 2018-09-05 NOTE — PROGRESS NOTES
[facility] [address].  [address], [address]  [place], [address] [place] [address] Street  Phone: [phone]  Fax: [phone]        Mona Carlson  : 1960  PCP: Autry Heimlich, MD    PROGRESS NOTE      ASSESSMENT AND PLAN    Diagnoses and all orders for this visit:    1. Spondylolisthesis of lumbar region    2. Bilateral sciatica    3. Fibromyalgia       1. Advised to continue HEP. 2. No indications for surgery or injections at this time. 3. Given information on fibromyalgia    Follow-up Disposition:  Return if symptoms worsen or fail to improve. HISTORY OF PRESENT ILLNESS  Dee Parrish is a 59 y.o. female. Last visit pt was sent to have a lumbar spine MRI. Images reviewed with the pt. No HNP/stenosis, age consistent degenerative changes. Pt at [facility] x yrs. She denies paresthesias in BLE. She reports a catching feeling in her back while walking. Pt denies visiting neurologist for back. Pt states she has seen pain psychologist. Pt reports trying biofeedback, psychiatrist, PT, and all she could do before starting medications. Pt reports she has an upcoming appointment with Dr. Margaret Reyes and one previously. Location of pain: low back  Does pain radiate into extremities: RLE into thigh and B/L buttocks  Does patient have weakness: no   Pt denies saddle paresthesias. Medications pt is on: Methadone 20/10/20mg, Topamax 150mg BID for seizures, Lidoderm patches. Flexeril 10mg TID. Pt denies recent ED visits or hospitalizations. Denies persistent fevers, chills, weight changes, neurogenic bowel or bladder symptoms. Treatments patient has tried:  Physical therapy:Yes  Doing HEP: Unknown  Non-opioid medications: Yes MDP and Toradol minimal effect  Spinal injections: Yes temporary benefit. TPIs with benefit to stand straight  Spinal surgery- No.        reviewed. PMHx of seizures. Pt has a cat.   Pt states her roommate is in a wheelchair and diaper and she helps care for her. Pain Assessment  9/5/2018   Location of Pain Back;Leg   Location Modifiers Left;Right; Inferior   Severity of Pain 5   Quality of Pain Aching;Burning   Duration of Pain Persistent   Frequency of Pain Constant   Aggravating Factors Stairs; Walking;Standing;Squatting;Kneeling;Exercise;Straightening;Stretching;Bending   Aggravating Factors Comment -   Limiting Behavior -   Relieving Factors Other (Comment)   Relieving Factors Comment Medication helps some   Result of Injury No     MRI Results (most recent):    Results from Hospital Encounter encounter on 08/25/18   MRI LUMB SPINE WO CONT   Narrative CLINICAL INDICATION/HISTORY:Low back pain, bilateral leg pain    COMPARISON:None. TECHNIQUE:    Imaging performed on wide bore Discovery [id number] e-Tag suite 3T magnet at [address] [place] [address]. Sagittal FLAIR T1 and T2 weighted and STIR sequences, and axial  spin echo T1 and MAKAYLA T2 weighted sequences were obtained of the lumbar spine  without gadolinium. FINDINGS:     Mild anterior spondylolisthesis L5/S1, slight retrolisthesis L2/3. No evidence  of fracture or spondylolysis. Mild asymmetric left-sided degenerative endplate  marrow edema C7/0. No other marrow edema or neoplastic marrow signal. The conus  medullaris is located at the L1 level and has a normal appearance and signal.     Visualized upper sacrum and lower thoracic levels and retroperitoneum  unremarkable. L1/2 level: Unremarkable. There is no central or foraminal stenosis. L2/3 level: Small broad right posterior disc protrusion with disc bulge and mild  degenerative facet changes, no significant stenosis. L3/4 level: Disc desiccation with mild nonimpinging disc bulge with minimal  degenerative facet changes. There is no central or foraminal stenosis. L4/5 level: Minimal degenerative disc changes and mild facet arthropathy. There  is no central or foraminal stenosis. L5/S1 level:  Moderate disc space narrowing with degenerative spondylosis with  moderate facet arthropathy right greater than left. There is no central or  foraminal stenosis. Impression IMPRESSION:    1. No evidence of high-grade stenosis or impingement to suggest etiology of  lower extremity radiculopathy. 2. Disc bulge with small broad right posterior disc protrusion L2/3 with  asymmetric left-sided degenerative endplate marrow edema, no significant  stenosis. 3. Facet arthropathy L5/S1 with mild anterior spondylolisthesis, no significant  stenosis. PAST MEDICAL HISTORY   Past Medical History:   Diagnosis Date    Anxiety     Cataract 6/19/15    bilat    Chronic sialoadenitis     Degenerative disc disease     Depression     Diabetes ([address]     Dry eye     Dyslipidemia     Fatigue     uses ritalin for this    Fibromyalgia     GERD (gastroesophageal reflux disease)     Headache(784.0)     daily, all her life    Hypertension     OCD (obsessive compulsive disorder)     PTSD (post-traumatic stress disorder)     Seizure disorder ([address]     Thyroid dysfunction     TMJ (temporomandibular joint disorder)     right    Viral encephalitis     unknown type from mosquito       Past Surgical History:   Procedure Laterality Date    HX  SECTION      X 2    HX COLONOSCOPY      ? f/u    HX PARTIAL HYSTERECTOMY     . MEDICATIONS    Current Outpatient Prescriptions   Medication Sig Dispense Refill    methadone (DOLOPHINE) 10 mg tablet 1 tablet 4 times daily. Indications: Chronic Pain 120 Tab 0    TENS Units (TENS 504) dean Please provide patient with a TENS unit to help improve function, improve quality of life, reduce medication use, improve ROM.    Multifactorial complex widespread chronic pain with contribution from lumbosacral spondylosis, bilateral greater trochanteric bursitis, bilateral piriformis syndrome, post 1 Device 0    docusate sodium (COLACE) 100 mg capsule Take 1 Cap by mouth two (2) times daily as needed for Constipation for up to 90 days. 60 Cap 2    simvastatin (ZOCOR) 40 mg tablet TAKE 1 TABLET BY MOUTH EVERY NIGHT AT BEDTIME  Indications: hypercholesterolemia 90 Tab 5    topiramate (TOPAMAX) 50 mg tablet Take 3 Tabs by mouth two (2) times a day. Indications: MIGRAINE PREVENTION, seizure 540 Tab 4    lidocaine (LIDODERM) 5 % Apply one patch and leave on 12 hours then remove for 12 hours. Indications: pain 30 Patch 1    metFORMIN ER (GLUCOPHAGE XR) 750 mg tablet Take 1 Tab by mouth daily. Indications: type 2 diabetes mellitus 90 Tab 4    promethazine (PHENERGAN) 25 mg tablet Take 1 Tab by mouth every six (6) hours as needed for Nausea. Indications: nausea 60 Tab 5    atenolol (TENORMIN) 25 mg tablet Take 1 Tab by mouth daily. Indications: hypertension 90 Tab 5    LORazepam (ATIVAN) 1 mg tablet TAKE 1 TABLET BY MOUTH TWICE DAILY AS NEEDED FOR ANXIETY 60 Tab 5    methylphenidate (RITALIN) 10 mg tablet Take 1 Tab by mouth three (3) times daily.  (Patient taking differently: Take 10 mg by mouth three (3) times daily as needed.) 90 Tab 0    NEXIUM 40 mg capsule TK 1 C PO QD AT 3PM  11    OTHER DDS Lumbar Traction Belt  Use as directed 1 Device prn    senna-docusate (PERICOLACE) 8.6-50 mg per tablet 1-2 tabs daily as needed for constipation 30 Tab 0        ALLERGIES  Allergies   Allergen Reactions    Zanaflex [Tizanidine] Other (comments)     Nausea and worsening headache          SOCIAL HISTORY    Social History     Social History    Marital status:      Spouse name: N/A    Number of children: N/A    Years of education: N/A     Occupational History    retired, , ,       Social History Main Topics    Smoking status: Never Smoker    Smokeless tobacco: Never Used    Alcohol use No    Drug use: No    Sexual activity: Not Currently     Other Topics Concern    Not on file     Social History Narrative       FAMILY HISTORY  Family History   Problem Relation Age of Onset    Alcohol abuse Father     Diabetes Sister     Migraines Daughter     Diabetes Maternal Aunt     Cancer Maternal Aunt     Diabetes Maternal Uncle     Diabetes Paternal Aunt     Diabetes Paternal Uncle     Heart Attack Maternal Grandmother        REVIEW OF SYSTEMS  Review of Systems   Constitutional: Positive for diaphoresis and malaise/fatigue. Negative for chills, fever and weight loss. Respiratory: Negative for shortness of breath. Cardiovascular: Negative for chest pain. Gastrointestinal: Negative for constipation. Negative for fecal incontinence   Genitourinary: Negative for dysuria. Negative for urinary incontinence   Musculoskeletal: Positive for back pain, joint pain, myalgias and neck pain. Per HPI   Skin: Negative for rash. Neurological: Positive for tremors, sensory change, focal weakness, seizures and headaches. Negative for dizziness and tingling. Endo/Heme/Allergies: Does not bruise/bleed easily. Psychiatric/Behavioral: The patient does not have insomnia. PHYSICAL EXAMINATION  Visit Vitals    /83    Pulse (!) 111    Temp 99 °F (37.2 °C)    Resp 21    Ht 5' 1\" (1.549 m)    Wt 165 lb (74.8 kg)    SpO2 99%    BMI 31.18 kg/m2         Accompanied by self. Constitutional:  Well developed, well nourished. Mildly distressed. Psychiatric: Anxious. Integumentary: No rashes or abrasions noted on exposed areas. Cardiovascular/Peripheral Vascular: Intact l pulses. No peripheral edema is noted. Lymphatic:  No evidence of lymphedema. No cervical lymphadenopathy. SPINE/MUSCULOSKELETAL EXAM    Lumbar spine:  No rash, ecchymosis, or gross obliquity. No fasciculations. No focal atrophy is noted. Diffuse tenderness to palpation in lumbar paraspinals, SI joints, sciatic notch. Trochanters non tender. Sensation grossly intact to light touch.       MOTOR:       Hip Flex  Quads Hamstrings Ankle DF EHL Ankle PF   Right +4/5 +4/5 +4/5 +4/5 +4/5 +4/5   Left +4/5 +4/5 +4/5 +4/5 +4/5 +4/5       Straight Leg raise negative    Pt presents in wheelchair. Written by Grecia Ribeiro, as dictated by Sonali Blount MD.    I, Dr. Sonali Blount MD, confirm that all documentation is accurate. Ms. Gavi Moncada may have a reminder for a \"due or due soon\" health maintenance. I have asked that she contact her primary care provider for follow-up on this health maintenance.

## 2018-09-05 NOTE — PATIENT INSTRUCTIONS
Fibromyalgia: Care Instructions  Your Care Instructions    Fibromyalgia is a painful condition that is not completely understood by medical experts. The cause of fibromyalgia is not known. It can make you feel tired and ache all over. It causes tender spots at specific points of the body that hurt only when you press on them. You may have trouble sleeping, as well as other symptoms. These problems can upset your work and home life. Symptoms tend to come and go, although they may never go away completely. Fibromyalgia does not harm your muscles, joints, or organs. Follow-up care is a key part of your treatment and safety. Be sure to make and go to all appointments, and call your doctor if you are having problems. It's also a good idea to know your test results and keep a list of the medicines you take. How can you care for yourself at home? · Exercise often. Walk, swim, or bike to help with pain and sleep problems and to make you feel better. · Try to get a good night's sleep. Go to bed and get up at the same time each day, whether you feel rested or not. Make sure you have a good mattress and pillow. · Reduce stress. Avoid things that cause you stress, if you can. If not, work at making them less stressful. Learn to use biofeedback, guided imagery, meditation, or other methods to relax. · Make healthy changes. Eat a balanced diet, quit smoking, and limit alcohol and caffeine. · Use a heating pad set on low or take warm baths or showers for pain. Using cold packs for up to 20 minutes at a time can also relieve pain. Put a thin cloth between the cold pack and your skin. A gentle massage might help too. · Be safe with medicines. Take your medicines exactly as prescribed. Call your doctor if you think you are having a problem with your medicine. Your doctor may talk to you about taking antidepressant medicines. These medicines may improve sleep, relieve pain, and in some cases treat depression.   · Learn about fibromyalgia. This makes coping easier. Then, take an active role in your treatment. · Think about joining a support group with others who have fibromyalgia to learn more and get support. When should you call for help? Watch closely for changes in your health, and be sure to contact your doctor if:    · You feel sad, helpless, or hopeless; lose interest in things you used to enjoy; or have other symptoms of depression.     · Your fibromyalgia symptoms get worse. Where can you learn more? Go to http://mary-consuelo.info/. Enter V003 in the search box to learn more about \"Fibromyalgia: Care Instructions. \"  Current as of: October 9, 2017  Content Version: 11.7  © 3450-7991 [employer]. Care instructions adapted under license by [employer] (which disclaims liability or warranty for this information). If you have questions about a medical condition or this instruction, always ask your healthcare professional. [address] any warranty or liability for your use of this information.

## 2018-10-04 ENCOUNTER — OFFICE VISIT (OUTPATIENT)
Dept: PAIN MANAGEMENT | Age: 58
End: 2018-10-04

## 2018-10-04 VITALS
HEIGHT: 61 IN | DIASTOLIC BLOOD PRESSURE: 83 MMHG | BODY MASS INDEX: 31.15 KG/M2 | HEART RATE: 112 BPM | SYSTOLIC BLOOD PRESSURE: 133 MMHG | WEIGHT: 165 LBS | TEMPERATURE: 99.1 F | RESPIRATION RATE: 14 BRPM

## 2018-10-04 DIAGNOSIS — M70.61 TROCHANTERIC BURSITIS OF BOTH HIPS: ICD-10-CM

## 2018-10-04 DIAGNOSIS — Z79.899 ENCOUNTER FOR LONG-TERM (CURRENT) USE OF HIGH-RISK MEDICATION: Primary | ICD-10-CM

## 2018-10-04 DIAGNOSIS — M70.62 TROCHANTERIC BURSITIS OF BOTH HIPS: ICD-10-CM

## 2018-10-04 DIAGNOSIS — M54.81 BILATERAL OCCIPITAL NEURALGIA: ICD-10-CM

## 2018-10-04 DIAGNOSIS — G89.4 CHRONIC PAIN SYNDROME: ICD-10-CM

## 2018-10-04 DIAGNOSIS — G57.03 PIRIFORMIS SYNDROME OF BOTH SIDES: ICD-10-CM

## 2018-10-04 DIAGNOSIS — M51.37 DEGENERATION OF LUMBAR OR LUMBOSACRAL INTERVERTEBRAL DISC: ICD-10-CM

## 2018-10-04 RX ORDER — METHADONE HYDROCHLORIDE 10 MG/1
TABLET ORAL
Qty: 120 TAB | Refills: 0 | Status: SHIPPED | OUTPATIENT
Start: 2018-10-10 | End: 2018-10-15 | Stop reason: ALTCHOICE

## 2018-10-04 NOTE — PROGRESS NOTES
Nursing Notes Patient presents to the office today in follow-up. Patient rates her pain at 7/10 on the numerical pain scale. Reviewed medications with counts as follows:   
Rx Date filled Qty Dispensed Pill Count Last Dose Short Methadone 10 mg 09/11/18 120 28 today no Last opioid agreement 01/29/18 Last urine drug screen 06/25/18 Comments: POC UDS was not performed in office today Any new labs or imaging since last appointment? NO Have you been to an emergency room (ER) or urgent care clinic since your last visit? NO Have you been hospitalized since your last visit? NO If yes, where, when, and reason for visit? Have you seen or consulted any other health care providers outside of the [address] [place] [address]  since your last visit? YES If yes, where, when, and reason for visit? orthopedic Ms. Jese Mata has a reminder for a \"due or due soon\" health maintenance. I have asked that she contact her primary care provider for follow-up on this health maintenance. PHQ over the last two weeks 10/4/2018 PHQ Not Done - Little interest or pleasure in doing things More than half the days Feeling down, depressed, irritable, or hopeless Not at all Total Score PHQ 2 2 Trouble falling or staying asleep, or sleeping too much Nearly every day Feeling tired or having little energy Nearly every day Poor appetite, weight loss, or overeating Nearly every day Feeling bad about yourself - or that you are a failure or have let yourself or your family down Not at all Trouble concentrating on things such as school, work, reading, or watching TV Several days Moving or speaking so slowly that other people could have noticed; or the opposite being so fidgety that others notice Not at all Thoughts of being better off dead, or hurting yourself in some way Not at all PHQ 9 Score 12  
 How difficult have these problems made it for you to do your work, take care of your home and get along with others Very difficult Pt is not currently seeing anyone for psychiatry/psychology. A referral was faxed to a psychology office back 09/05/18 and that states that she has not heard from them yet. Provider made aware.

## 2018-10-04 NOTE — PROGRESS NOTES
Referral date 2013, From pain management center in [address] [place] [address] Drive widespread pain and headaches. Social History Social History  Marital status:  Spouse name: N/A  
 Number of children: N/A  
 Years of education: N/A Occupational History  retired, , ,  History Main Topics  Smoking status: Never Smoker  Smokeless tobacco: Never Used  Alcohol use No  
 Drug use: No  
 Sexual activity: Not Currently Other Topics Concern  Not on file Social History Narrative Family History Problem Relation Age of Onset  Alcohol abuse Father  Diabetes Sister  Migraines Daughter  Diabetes Maternal Aunt  Cancer Maternal Aunt  Diabetes Maternal Uncle  Diabetes Paternal Aunt  Diabetes Paternal Uncle  Heart Attack Maternal Grandmother Allergies Allergen Reactions  Zanaflex [Tizanidine] Other (comments) Nausea and worsening headache Past Medical History:  
Diagnosis Date  Anxiety  Cataract 6/19/15  
 bilat  Chronic sialoadenitis  Degenerative disc disease  Depression  Diabetes ([facility] [address]   Dry eye  Dyslipidemia  Fatigue   
 uses ritalin for this  Fibromyalgia  GERD (gastroesophageal reflux disease)  Headache(784.0)   
 daily, all her life  Hypertension  OCD (obsessive compulsive disorder)  PTSD (post-traumatic stress disorder)  Seizure disorder ([facility] [address]   Thyroid dysfunction  TMJ (temporomandibular joint disorder) right  Viral encephalitis   
 unknown type from mosquito Past Surgical History:  
Procedure Laterality Date  HX  SECTION    
 X 2  
 HX COLONOSCOPY    
 ? f/u  
 HX PARTIAL HYSTERECTOMY Current Outpatient Prescriptions on File Prior to Visit Medication Sig  
 docusate sodium (COLACE) 100 mg capsule Take 1 Cap by mouth two (2) times daily as needed for Constipation for up to 90 days.  simvastatin (ZOCOR) 40 mg tablet TAKE 1 TABLET BY MOUTH EVERY NIGHT AT BEDTIME  Indications: hypercholesterolemia  topiramate (TOPAMAX) 50 mg tablet Take 3 Tabs by mouth two (2) times a day. Indications: MIGRAINE PREVENTION, seizure  lidocaine (LIDODERM) 5 % Apply one patch and leave on 12 hours then remove for 12 hours. Indications: pain  metFORMIN ER (GLUCOPHAGE XR) 750 mg tablet Take 1 Tab by mouth daily. Indications: type 2 diabetes mellitus  senna-docusate (PERICOLACE) 8.6-50 mg per tablet 1-2 tabs daily as needed for constipation  promethazine (PHENERGAN) 25 mg tablet Take 1 Tab by mouth every six (6) hours as needed for Nausea. Indications: nausea  atenolol (TENORMIN) 25 mg tablet Take 1 Tab by mouth daily. Indications: hypertension  LORazepam (ATIVAN) 1 mg tablet TAKE 1 TABLET BY MOUTH TWICE DAILY AS NEEDED FOR ANXIETY  NEXIUM 40 mg capsule TK 1 C PO QD AT 3PM  
 OTHER DDS Lumbar Traction Belt Use as directed  TENS Units (TENS 504) dean Please provide patient with a TENS unit to help improve function, improve quality of life, reduce medication use, improve ROM. Multifactorial complex widespread chronic pain with contribution from lumbosacral spondylosis, bilateral greater trochanteric bursitis, bilateral piriformis syndrome, post  
 methylphenidate (RITALIN) 10 mg tablet Take 1 Tab by mouth three (3) times daily. (Patient taking differently: Take 10 mg by mouth three (3) times daily as needed.) No current facility-administered medications on file prior to visit. HPI: 
Joon Patel is a 59 y.o. female here for f/u visit for ongoing evaluation of bilateral hip pain, lumbosacral pain, suboccipital pain. Pt was last seen here on August 30, 2018. Pt denies interval changes in the character or distribution of pain.  Pain is located in multiple body areas including near the thoracolumbar junction as a burning pain in bilateral gluteal region as pins and needles, over the bilateral greater trochanters and in the upper cervical spine. Pain described as stabbing and aching with burning component in the back and pins and needles in the gluteal region. The pain ranges from 4-8/10. 
 
--TENS unit has not been delivered.  referral for pain psychology but this has not been scheduled. She does not think this will help.  Postural improvement home exercises have been minimally initiated. --She has obtained the book, \"The Trigger point therapy workbook\" Neurology plan includes f/u as needed. Spine surgeon follow-up not done, Pt saw Physiatrist instead. --Piriformis muscle stretches have been tried but she has not done enough yet to know if they make a difference.  \"Yoga for people with chronic pain\" investigation has not been done. Lumbosacral orthotic is intermittently helpful but hurts over the bilat ASIS.  Topamax 150 mg twice daily from primary care provider for seizure and migraine but she perceives no benefit for pain. Lidoderm patches help for her pain. They are worn mainly at the l/s junction. Kerney Trev provided by PCP.  Methadone 10 mg 4 times daily. No significant pain relief. Minimal benefit with improvement in activity tolerance and function. Denies adverse side effects or apparent behaviors. ROS: 
 
Opioid specific risk:Concurrent benzodiazepine use, depression, family history of suicide, family history of substance use disorder, PTSD, depression, did not  the Narcan nasal spray which was previously ordered, diabetes, anxiety.  Seizure disorder 
  
 
Opioid specific history: Greater than 21 consecutive years on opioids. Vitals:  
 10/04/18 1405 BP: 133/83 Pulse: (!) 112 Resp: 14 Temp: 99.1 °F (37.3 °C) TempSrc: Oral  
Weight: 74.8 kg (165 lb) Height: 5' 1\" (1.549 m) PainSc:   7 EK18 showed QT of 366ms PE: 
AFVSS with tachycardia, no acute distress, overweight body habitus. A&OXs 3. 
normocephalic, atraumatic. Conjugate gaze, clear sclerae. Speech is clear and appropriate. Slowed mental processing. Pace speech is decreased. Patient is cooperative. Mood is appropriate. Patient is intermittently tearful. Gait is within functional limits with decreased evelyne. [facility] Balance is within functional limits. Naloxone rescue -yes Prophylactic bowel program -yes Date of last OCA 2018. Last UDS 2018, findings consistent , date checked today, findings were consistent Primary Care Physician Brisa Foote [address] [id number] 
[phone] GIC-incomplete PRINCE -52% with 9 questions answered COMM- 3 
 
PHQ -- . PHQ over the last two weeks 10/4/2018 PHQ Not Done - Little interest or pleasure in doing things More than half the days Feeling down, depressed, irritable, or hopeless Not at all Total Score PHQ 2 2 Trouble falling or staying asleep, or sleeping too much Nearly every day Feeling tired or having little energy Nearly every day Poor appetite, weight loss, or overeating Nearly every day Feeling bad about yourself - or that you are a failure or have let yourself or your family down Not at all Trouble concentrating on things such as school, work, reading, or watching TV Several days Moving or speaking so slowly that other people could have noticed; or the opposite being so fidgety that others notice Not at all Thoughts of being better off dead, or hurting yourself in some way Not at all PHQ 9 Score 12 How difficult have these problems made it for you to do your work, take care of your home and get along with others Very difficult DSM V-OUD Screen-- moderate Assessment/Plan: ICD-10-CM ICD-9-CM 1. Encounter for long-term (current) use of high-risk medication Z79.899 V58.69   
2. Bilateral occipital neuralgia M54.81 723.8 methadone (DOLOPHINE) 10 mg tablet 3. Piriformis syndrome of both sides G57.01 355.0 methadone (DOLOPHINE) 10 mg tablet G57.02    
4. Chronic pain syndrome G89.4 338.4 methadone (DOLOPHINE) 10 mg tablet 5. Trochanteric bursitis of both hips M70.61 726.5 methadone (DOLOPHINE) 10 mg tablet  
 M70.62 6. Degeneration of lumbar or lumbosacral intervertebral disc M51.37 722.52 methadone (DOLOPHINE) 10 mg tablet  
  
--Dr Joaquin Dumont evaluation is still needed. Hold wean until evaluated by Dr Joaquin Dumont. Continue methadone at its current dose of 10 mg 4 times daily until she has been evaluated for potential substance use disorder. If there is no substance use disorder or alterations suggested by the addictionologist then we will progress with the opioid wean at next refill and provide methadone 10 mg 3 times daily. --Patient would likely benefit from intermittent bilateral greater occipital nerve blocks, piriformis injections, greater trochanteric bursa injections. --Still apprehensive about NSAIDs, Tylenol, TCAs, Lyrica, gabapentin and others. Patient still not currently wanting to try any of these options. --Another potential option for pain treatment would be Lidoderm versus ketamine infusions but the patient states she cannot afford these treatments. --topical agents such as compounded creams, doxepin cream, menthol, capsaicin were again discussed. --continue to use lidoderm as needed. --TENS still has not been obtained, patient will call her insurance company to investigate the delay . --Discussion of spinal cord or intrathecal pain pump continued. These modalities could potentially provide significant improvement in her overall quality of life if she is willing to give it a try.   However, this would also require a mental health evaluation prior to implant and she is already resistant to participation in pain psychology. -- again, Patient may  benefit from a referral to a tertiary inpatient pain rehabilitation clinic. --I do not recommend long-term opioid therapy for this patient's chronic pain. The plan was to continue to wean while development of the rest of the plan of care continues. However, the patient continues to be resistant to development of a comprehensive plan of care for her pain remains focused on only the methadone as a potential treatment modality. The risks of maintaining her on long term opioids outweigh the potential benefits. There are numerous options available as outlined above. She was again educated on signs/symptoms of opioid withdrawal and assured that we will provide support as needed. --Patient will also likely benefit from physical therapy focused on treatment of fear avoidance and Kinesio phobia more so than specific musculoskeletal pathologies. She does not want to return to physical therapy at this time. this will be an ongoing option for her. --There has been little progress made in developing a therapeutic alliance. GOALS: 
To establish complementary and integrative plan of care to address chronic pain issues while minimizing pharmaceuticals to maximize patient's function improve quality of life. Education: 
Patient again educated on the importance of strict compliance with the opioid care agreement while on opioid therapy. Patient also again educated that they should avoid driving while on chronic opioid therapy. Also advised to avoid alcohol and to avoid benzodiazepines while on opioid therapy. Patient Homework: 
Continue to independently investigate yoga for persons with chronic pain. F/u:. Follow-up Disposition: 
Return for 30 min.

## 2018-10-04 NOTE — MR AVS SNAPSHOT
[address] [address] 
[phone] Patient: Bharti Ndiaye MRN: [id number] [id number] Visit Information Date & Time Provider Department Dept. Phone Encounter #  
 10/4/2018  2:00 PM Mati Henry [facility] [address] for Pain Management [phone] [id number] Follow-up Instructions Return for 30 min. Your Appointments 11/7/2018  1:00 PM  
Office Visit with Hudson Pickard MD  
[facility] App[facility] Note: 4 month f/u combo clinic, chol; 4 month f/u combo clinic, chol  
 [address]  
  
   
 [address] [facility] Upcoming Health Maintenance Date Due Pneumococcal 19-64 Medium Risk (1 of 1 - PPSV23) 3/18/1977 Shingrix Vaccine Age 50> (1 of 2) 3/18/2008 COLONOSCOPY 1/20/2017 EYE EXAM RETINAL OR DILATED Q1 3/14/2018 Influenza Age 5 to Adult 8/1/2018 BREAST CANCER SCRN MAMMOGRAM 1/17/2019 DTaP/Tdap/Td series (1 - Tdap) 12/26/2018* MEDICARE YEARLY EXAM 12/27/2018 HEMOGLOBIN A1C Q6M 1/9/2019 FOOT EXAM Q1 7/9/2019 MICROALBUMIN Q1 7/9/2019 LIPID PANEL Q1 7/9/2019 PAP AKA CERVICAL CYTOLOGY 12/26/2020 *Topic was postponed. The date shown is not the original due date. Allergies as of 10/4/2018  Review Complete On: 10/4/2018 By: Mati Henry DO Severity Noted Reaction Type Reactions Zanaflex [Tizanidine]  04/18/2016   Side Effect Other (comments) Nausea and worsening headache Current Immunizations  Reviewed on 4/3/2018 Name Date Influenza Vaccine 9/30/2015  2:50 PM  
 Influenza Vaccine (Quad) PF 12/26/2017, 12/15/2016 Pneumococcal Conjugate (PCV-13) 12/15/2016 Not reviewed this visit You Were Diagnosed With   
  
 Codes Comments  Encounter for long-term (current) use of high-risk medication    - Primary ICD-10-CM: H33.257 ICD-9-CM: V58.69 Bilateral occipital neuralgia     ICD-10-CM: M54.81 ICD-9-CM: 723.8 Piriformis syndrome of both sides     ICD-10-CM: G57.01, G57.02 
ICD-9-CM: 355.0 Chronic pain syndrome     ICD-10-CM: G89.4 ICD-9-CM: 338.4 Trochanteric bursitis of both hips     ICD-10-CM: M70.61, M70.62 ICD-9-CM: 726.5 Degeneration of lumbar or lumbosacral intervertebral disc     ICD-10-CM: M51.37 
ICD-9-CM: 722.52 Vitals BP Pulse Temp Resp Height(growth percentile) Weight(growth percentile) 133/83 (BP 1 Location: Left arm, BP Patient Position: Sitting) (!) 112 99.1 °F (37.3 °C) (Oral) 14 5' 1\" (1.549 m) 165 lb (74.8 kg) BMI OB Status Smoking Status 31.18 kg/m2 Hysterectomy Never Smoker Vitals History BMI and BSA Data Body Mass Index Body Surface Area  
 31.18 kg/m 2 1.79 m 2 Preferred Pharmacy Pharmacy Name Phone [facility], [address] [place] [address] [phone] Your Updated Medication List  
  
   
This list is accurate as of 10/4/18  3:16 PM.  Always use your most recent med list.  
  
  
  
  
 atenolol 25 mg tablet Commonly known as:  TENORMIN Take 1 Tab by mouth daily. Indications: hypertension  
  
 docusate sodium 100 mg capsule Commonly known as:  Jose Ramon [place] Take 1 Cap by mouth two (2) times daily as needed for Constipation for up to 90 days. lidocaine 5 % Commonly known as:  Nisreen [place] Apply one patch and leave on 12 hours then remove for 12 hours. Indications: pain LORazepam 1 mg tablet Commonly known as:  ATIVAN  
TAKE 1 TABLET BY MOUTH TWICE DAILY AS NEEDED FOR ANXIETY  
  
 metFORMIN  mg tablet Commonly known as:  GLUCOPHAGE XR Take 1 Tab by mouth daily. Indications: type 2 diabetes mellitus  
  
 methadone 10 mg tablet Commonly known as:  DOLOPHINE  
 1 tablet 4 times daily. Indications: Chronic Pain Start taking on:  10/10/2018  
  
 methylphenidate HCl 10 mg tablet Commonly known as:  RITALIN Take 1 Tab by mouth three (3) times daily. NexIUM 40 mg capsule Generic drug:  esomeprazole TK 1 C PO QD AT 3PM  
  
 OTHER  
DDS Lumbar Traction Belt Use as directed  
  
 promethazine 25 mg tablet Commonly known as:  PHENERGAN Take 1 Tab by mouth every six (6) hours as needed for Nausea. Indications: nausea  
  
 senna-docusate 8.6-50 mg per tablet Commonly known as:  PERICOLACE  
1-2 tabs daily as needed for constipation  
  
 simvastatin 40 mg tablet Commonly known as:  ZOCOR  
TAKE 1 TABLET BY MOUTH EVERY NIGHT AT BEDTIME  Indications: hypercholesterolemia TENS Units [employer] Commonly known as:  TENS 504 Please provide patient with a TENS unit to help improve function, improve quality of life, reduce medication use, improve ROM. Multifactorial complex widespread chronic pain with contribution from lumbosacral spondylosis, bilateral greater trochanteric bursitis, bilateral piriformis syndrome, post  
  
 topiramate 50 mg tablet Commonly known as:  TOPAMAX Take 3 Tabs by mouth two (2) times a day. Indications: MIGRAINE PREVENTION, seizure Prescriptions Printed Refills  
 methadone (DOLOPHINE) 10 mg tablet 0 Starting on: 10/10/2018 Si tablet 4 times daily. Indications: Chronic Pain Class: Print Follow-up Instructions Return for 30 min. Introducing [facility]! [facility] introduces [employer] patient portal. Now you can access parts of your medical record, email your doctor's office, and request medication refills online. 1. In your internet browser, go to [email or web address] 2. Click on the First Time User? Click Here link in the Sign In box. You will see the New Member Sign Up page. 3. Enter your [employer] Access Code exactly as it appears below.  You will not need to use this code after youve completed the sign-up process. If you do not sign up before the expiration date, you must request a new code. · [facility] Access Code: [email or web address] Expires: 10/31/2018  1:12 PM 
 
4. Enter the last four digits of your Social Security Number (xxxx) and Date of Birth (mm/dd/yyyy) as indicated and click Submit. You will be taken to the next sign-up page. 5. Create a [facility] ID. This will be your [facility] login ID and cannot be changed, so think of one that is secure and easy to remember. 6. Create a [facility] password. You can change your password at any time. 7. Enter your Password Reset Question and Answer. This can be used at a later time if you forget your password. 8. Enter your e-mail address. You will receive e-mail notification when new information is available in [address] 9. Click Sign Up. You can now view and download portions of your medical record. 10. Click the Download Summary menu link to download a portable copy of your medical information. If you have questions, please visit the Frequently Asked Questions section of the [facility] website. Remember, [facility] is NOT to be used for urgent needs. For medical emergencies, dial 911. Now available from your iPhone and Android! Please provide this summary of care documentation to your next provider. Your primary care clinician is listed as [facility]. If you have any questions after today's visit, please call [phone].

## 2018-10-15 ENCOUNTER — OFFICE VISIT (OUTPATIENT)
Dept: PAIN MANAGEMENT | Age: 58
End: 2018-10-15

## 2018-10-15 VITALS
BODY MASS INDEX: 31.15 KG/M2 | RESPIRATION RATE: 14 BRPM | TEMPERATURE: 99 F | HEIGHT: 61 IN | HEART RATE: 126 BPM | SYSTOLIC BLOOD PRESSURE: 148 MMHG | WEIGHT: 165 LBS | DIASTOLIC BLOOD PRESSURE: 96 MMHG

## 2018-10-15 DIAGNOSIS — F11.20 UNCOMPLICATED OPIOID DEPENDENCE (HCC): ICD-10-CM

## 2018-10-15 DIAGNOSIS — Z79.899 ENCOUNTER FOR LONG-TERM (CURRENT) USE OF HIGH-RISK MEDICATION: Primary | ICD-10-CM

## 2018-10-15 DIAGNOSIS — G89.4 CHRONIC PAIN SYNDROME: ICD-10-CM

## 2018-10-15 DIAGNOSIS — M54.17 LUMBOSACRAL RADICULOPATHY: ICD-10-CM

## 2018-10-15 RX ORDER — OXYCODONE HYDROCHLORIDE 15 MG/1
TABLET ORAL
Qty: 12 TAB | Refills: 0 | Status: SHIPPED | OUTPATIENT
Start: 2018-10-15 | End: 2018-11-26

## 2018-10-15 RX ORDER — CLONIDINE HYDROCHLORIDE 0.1 MG/1
0.1 TABLET ORAL 2 TIMES DAILY
Qty: 15 TAB | Refills: 0 | Status: SHIPPED | OUTPATIENT
Start: 2018-10-15 | End: 2018-10-15 | Stop reason: SDUPTHER

## 2018-10-15 RX ORDER — CLONIDINE HYDROCHLORIDE 0.1 MG/1
TABLET ORAL
Qty: 168 TAB | Refills: 0 | Status: SHIPPED | OUTPATIENT
Start: 2018-10-15 | End: 2018-11-26

## 2018-10-15 RX ORDER — BUPRENORPHINE AND NALOXONE 8; 2 MG/1; MG/1
FILM, SOLUBLE BUCCAL; SUBLINGUAL
Qty: 7 FILM | Refills: 0 | Status: SHIPPED | OUTPATIENT
Start: 2018-10-15 | End: 2018-11-26

## 2018-10-15 RX ORDER — BUPRENORPHINE AND NALOXONE 8; 2 MG/1; MG/1
FILM, SOLUBLE BUCCAL; SUBLINGUAL
Qty: 7 FILM | Refills: 0 | Status: SHIPPED | OUTPATIENT
Start: 2018-10-18 | End: 2018-10-15 | Stop reason: SDUPTHER

## 2018-10-15 NOTE — PROGRESS NOTES
Nursing Notes Patient presents to the office today in follow-up. Patient rates her pain at 8/10 on the numerical pain scale. Reviewed medications with counts as follows:   
Rx Date filled Qty Dispensed Pill Count Last Dose Short Methadone 10 mg 10/11/18 120 ? today Pt did not bring Rx for methadone; counseling done and  shows:10/11/18 Last opioid agreement 1/29/18 Last urine drug screen 8/15/18 PHQ over the last two weeks 10/15/2018 PHQ Not Done - Little interest or pleasure in doing things Not at all Feeling down, depressed, irritable, or hopeless Not at all Total Score PHQ 2 0 Trouble falling or staying asleep, or sleeping too much - Feeling tired or having little energy - Poor appetite, weight loss, or overeating - Feeling bad about yourself - or that you are a failure or have let yourself or your family down - Trouble concentrating on things such as school, work, reading, or watching TV - Moving or speaking so slowly that other people could have noticed; or the opposite being so fidgety that others notice - Thoughts of being better off dead, or hurting yourself in some way -  
PHQ 9 Score - How difficult have these problems made it for you to do your work, take care of your home and get along with others - Comments: POC UDS was not performed in office today Any new labs or imaging since last appointment? NO Have you been to an emergency room (ER) or urgent care clinic since your last visit? NO Have you been hospitalized since your last visit? NO If yes, where, when, and reason for visit? Have you seen or consulted any other health care providers outside of the [address] [place] [address]  since your last visit? NO If yes, where, when, and reason for visit? Ms. Agata Power has a reminder for a \"due or due soon\" health maintenance.  I have asked that she contact her primary care provider for follow-up on this health maintenance.

## 2018-10-15 NOTE — MR AVS SNAPSHOT
[address] [facility] [id number] [phone] Patient: Bjorn Gill MRN: [id number] Visit Information Date & Time Provider Department Dept. Phone Encounter #  
 10/15/2018  1:00 PM Radha Palomo MD [facility] [phone] Follow-up Instructions Return in about 1 week (around 10/22/2018) for medication re-evaluation. Your Appointments 11/7/2018  1:00 PM  
Office Visit with Inga Qureshi MD  
[facility] [facility]) Appt Note: 4 month f/u combo clinic, chol; 4 month f/u combo clinic, chol  
 [address]  
  
   
 [address] [facility] Upcoming Health Maintenance Date Due Pneumococcal 19-64 Medium Risk (1 of 1 - PPSV23) 3/18/1977 Shingrix Vaccine Age 50> (1 of 2) 3/18/2008 COLONOSCOPY 1/20/2017 EYE EXAM RETINAL OR DILATED Q1 3/14/2018 Influenza Age 5 to Adult 8/1/2018 BREAST CANCER SCRN MAMMOGRAM 1/17/2019 DTaP/Tdap/Td series (1 - Tdap) 12/26/2018* MEDICARE YEARLY EXAM 12/27/2018 HEMOGLOBIN A1C Q6M 1/9/2019 FOOT EXAM Q1 7/9/2019 MICROALBUMIN Q1 7/9/2019 LIPID PANEL Q1 7/9/2019 PAP AKA CERVICAL CYTOLOGY 12/26/2020 *Topic was postponed. The date shown is not the original due date. Allergies as of 10/15/2018  Review Complete On: 10/15/2018 By: Malcolm Sender Severity Noted Reaction Type Reactions Zanaflex [Tizanidine]  04/18/2016   Side Effect Other (comments) Nausea and worsening headache Current Immunizations  Reviewed on 4/3/2018 Name Date Influenza Vaccine 9/30/2015  2:50 PM  
 Influenza Vaccine (Quad) PF 12/26/2017, 12/15/2016 Pneumococcal Conjugate (PCV-13) 12/15/2016 Not reviewed this visit You Were Diagnosed With   
  
 Codes Comments Encounter for long-term (current) use of high-risk medication    -  Primary ICD-10-CM: T14.248 ICD-9-CM: V58.69 Uncomplicated opioid dependence ([address]     ICD-10-CM: Z91.26 ICD-9-CM: 304.00 Will initiate Treatment with Buprenorphine Chronic pain syndrome     ICD-10-CM: G89.4 ICD-9-CM: 338.4 Pt to f/u with Dr. Cheryl Ruiz Lumbosacral radiculopathy     ICD-10-CM: M54.17 ICD-9-CM: 724.4 Vitals BP Pulse Temp Resp Height(growth percentile) Weight(growth percentile) (!) 148/96 (BP 1 Location: Right arm, BP Patient Position: Sitting) (!) 126 99 °F (37.2 °C) (Oral) 14 5' 1\" (1.549 m) 165 lb (74.8 kg) BMI OB Status Smoking Status 31.18 kg/m2 Hysterectomy Never Smoker Vitals History BMI and BSA Data Body Mass Index Body Surface Area  
 31.18 kg/m 2 1.79 m 2 Preferred Pharmacy Pharmacy Name Phone [address] [facility], [address] [place] [address]  [phone] Your Updated Medication List  
  
   
This list is accurate as of 10/15/18  2:08 PM.  Always use your most recent med list.  
  
  
  
  
 atenolol 25 mg tablet Commonly known as:  TENORMIN Take 1 Tab by mouth daily. Indications: hypertension  
  
 docusate sodium 100 mg capsule Commonly known as:  Orien  Take 1 Cap by mouth two (2) times daily as needed for Constipation for up to 90 days. lidocaine 5 % Commonly known as:  Shamar Maza Apply one patch and leave on 12 hours then remove for 12 hours. Indications: pain LORazepam 1 mg tablet Commonly known as:  ATIVAN  
TAKE 1 TABLET BY MOUTH TWICE DAILY AS NEEDED FOR ANXIETY  
  
 metFORMIN  mg tablet Commonly known as:  GLUCOPHAGE XR Take 1 Tab by mouth daily. Indications: type 2 diabetes mellitus  
  
 methadone 10 mg tablet Commonly known as:  DOLOPHINE  
1 tablet 4 times daily. Indications: Chronic Pain  
  
 methylphenidate HCl 10 mg tablet Commonly known as:  RITALIN Take 1 Tab by mouth three (3) times daily. NexIUM 40 mg capsule Generic drug:  esomeprazole TK 1 C PO QD AT 3PM  
  
 OTHER  
DDS Lumbar Traction Belt Use as directed  
  
 promethazine 25 mg tablet Commonly known as:  PHENERGAN Take 1 Tab by mouth every six (6) hours as needed for Nausea. Indications: nausea  
  
 senna-docusate 8.6-50 mg per tablet Commonly known as:  PERICOLACE  
1-2 tabs daily as needed for constipation  
  
 simvastatin 40 mg tablet Commonly known as:  ZOCOR  
TAKE 1 TABLET BY MOUTH EVERY NIGHT AT BEDTIME  Indications: hypercholesterolemia TENS Units Kerrie Giorgiodiogenes Commonly known as:  TENS 504 Please provide patient with a TENS unit to help improve function, improve quality of life, reduce medication use, improve ROM. Multifactorial complex widespread chronic pain with contribution from lumbosacral spondylosis, bilateral greater trochanteric bursitis, bilateral piriformis syndrome, post  
  
 topiramate 50 mg tablet Commonly known as:  TOPAMAX Take 3 Tabs by mouth two (2) times a day. Indications: MIGRAINE PREVENTION, seizure Follow-up Instructions Return in about 1 week (around 10/22/2018) for medication re-evaluation. Introducing [facility]! Missy Sinha introduces [employer] patient portal. Now you can access parts of your medical record, email your doctor's office, and request medication refills online. 1. In your internet browser, go to [email or web address] 2. Click on the First Time User? Click Here link in the Sign In box. You will see the New Member Sign Up page. 3. Enter your [employer] Access Code exactly as it appears below. You will not need to use this code after youve completed the sign-up process. If you do not sign up before the expiration date, you must request a new code. · [employer] Access Code: [email or web address] Expires: 10/31/2018  1:12 PM 
 
 4. Enter the last four digits of your Social Security Number (xxxx) and Date of Birth (mm/dd/yyyy) as indicated and click Submit. You will be taken to the next sign-up page. 5. Create a RiseSmart ID. This will be your [employer] login ID and cannot be changed, so think of one that is secure and easy to remember. 6. Create a [employer]Smart password. You can change your password at any time. 7. Enter your Password Reset Question and Answer. This can be used at a later time if you forget your password. 8. Enter your e-mail address. You will receive e-mail notification when new information is available in [address] 9. Click Sign Up. You can now view and download portions of your medical record. 10. Click the Download Summary menu link to download a portable copy of your medical information. If you have questions, please visit the Frequently Asked Questions section of the [employer] website. Remember, [employer]Smart is NOT to be used for urgent needs. For medical emergencies, dial 911. Now available from your iPhone and Android! Please provide this summary of care documentation to your next provider. Your primary care clinician is listed as [facility]. If you have any questions after today's visit, please call [phone].

## 2018-10-15 NOTE — PROGRESS NOTES
Today's Date:  10/15/2018 Patient:  Joon Patel Patient :  1960 Chief Complaint Patient presents with  Back Pain  Hip Pain  Medication Management Joon Patel  Is a 59 y.o.  female  who presents for initial visit with me. Pt referred for evaluation of possible opioid use disorder. Pt with long h/o opioid use and dependence. Pt with chronic diffuse pain, as well as H/A. Per Pt, she was on an inconceivably high dose of MTD initially, but had no more relief of her symptoms than with present dose. She is visibly anxious, shaking. Pt is here alone Past Opioid History: 
  Age of onset: Around age 34 for what she says was chronic headaches and fibromyalgia. This occurred while she was living in [place]. Moved back to [place] in . Followed at this facility since 2013. Substance(s) initially used: She states she was initially started on 100 mg tid of MTD, which is obviously impossible. Substance(s) presently used: Methadone Average daily intake: 10 mg qid Present MME: 120 Time of most recent use: 5 hours ago. Longest period without use: N/A 
H/O any experienced withdrawal symptoms: No 
H/O overdose: No 
Prior Buprenorphine use: No 
Marijuana use: No 
Cocaine use: No 
Amphetamine use: Yes, Ritalin 10 mg tid Alcohol use: No 
Benzodiazepine use: Yes, Lorazepam 1 mg bid Tobacco use: No 
Spouse/Significant Other with Addiction/Dependence issues: N/A Past Medical History: 
Past Medical History:  
Diagnosis Date  Anxiety  Cataract 6/19/15  
 bilat  Chronic sialoadenitis  Degenerative disc disease  Depression  Diabetes ([facility] [address]   Dry eye  Dyslipidemia  Fatigue   
 uses ritalin for this  Fibromyalgia  GERD (gastroesophageal reflux disease)  Headache(784.0)   
 daily, all her life  Hypertension  OCD (obsessive compulsive disorder)  PTSD (post-traumatic stress disorder)  Seizure disorder ([facility] [address]   Thyroid dysfunction  TMJ (temporomandibular joint disorder) right  Viral encephalitis   
 unknown type from mosquito Past Surgical History: 
Past Surgical History:  
Procedure Laterality Date  HX  SECTION    
 X 2  
 HX COLONOSCOPY  2012  
 ? f/u  
 HX PARTIAL HYSTERECTOMY Social history:  
Social History Social History  Marital status:  Spouse name: N/A  
 Number of children: N/A  
 Years of education: N/A Occupational History  retired, , ,  History Main Topics  Smoking status: Never Smoker  Smokeless tobacco: Never Used  Alcohol use No  
 Drug use: No  
 Sexual activity: Not Currently Other Topics Concern  None Social History Narrative Employment Status: Disabled Johanna Arceo MD 
[facility] / N* Medications: 
Current Outpatient Prescriptions on File Prior to Visit Medication Sig Dispense Refill  simvastatin (ZOCOR) 40 mg tablet TAKE 1 TABLET BY MOUTH EVERY NIGHT AT BEDTIME  Indications: hypercholesterolemia 90 Tab 5  
 topiramate (TOPAMAX) 50 mg tablet Take 3 Tabs by mouth two (2) times a day. Indications: MIGRAINE PREVENTION, seizure 540 Tab 4  
 lidocaine (LIDODERM) 5 % Apply one patch and leave on 12 hours then remove for 12 hours. Indications: pain 30 Patch 1  
 metFORMIN ER (GLUCOPHAGE XR) 750 mg tablet Take 1 Tab by mouth daily. Indications: type 2 diabetes mellitus 90 Tab 4  
 senna-docusate (PERICOLACE) 8.6-50 mg per tablet 1-2 tabs daily as needed for constipation 30 Tab 0  
 atenolol (TENORMIN) 25 mg tablet Take 1 Tab by mouth daily. Indications: hypertension 90 Tab 5  
 LORazepam (ATIVAN) 1 mg tablet TAKE 1 TABLET BY MOUTH TWICE DAILY AS NEEDED FOR ANXIETY 60 Tab 5  NEXIUM 40 mg capsule TK 1 C PO QD AT 3PM  11  
 OTHER DDS Lumbar Traction Belt Use as directed 1 Device prn  TENS Units (TENS 504) dean Please provide patient with a TENS unit to help improve function, improve quality of life, reduce medication use, improve ROM. Multifactorial complex widespread chronic pain with contribution from lumbosacral spondylosis, bilateral greater trochanteric bursitis, bilateral piriformis syndrome, post 1 Device 0  
 docusate sodium (COLACE) 100 mg capsule Take 1 Cap by mouth two (2) times daily as needed for Constipation for up to 90 days. 60 Cap 2  promethazine (PHENERGAN) 25 mg tablet Take 1 Tab by mouth every six (6) hours as needed for Nausea. Indications: nausea 60 Tab 5  
 methylphenidate (RITALIN) 10 mg tablet Take 1 Tab by mouth three (3) times daily. (Patient taking differently: Take 10 mg by mouth three (3) times daily as needed.) 90 Tab 0 No current facility-administered medications on file prior to visit. Allergies: Allergies Allergen Reactions  Zanaflex [Tizanidine] Other (comments) Nausea and worsening headache Past Family History:  
Family History Problem Relation Age of Onset  Alcohol abuse Father  Diabetes Sister  Migraines Daughter  Diabetes Maternal Aunt  Cancer Maternal Aunt  Diabetes Maternal Uncle  Diabetes Paternal Aunt  Diabetes Paternal Uncle  Heart Attack Maternal Grandmother REVIEW OF SYSTEMS:  
Constitutional  no wt loss, night sweats, unexplained fevers Oral  no mouth pain, tongue or tooth problems, no swallowing problems Cardiac  no CP, PND, orthopnea, palpitations or syncope Resp  no wheezing, chronic coughing, dyspnea GI  no heartburn, nausea, vomiting,constipation, diarrhea,bleeding.   no dysuria, hematuria, urgency, frequency Ortho  as noted Derm  no  rashes, lesions. Psych  positive anxiety and depression symptoms, no hallucinations, suicidal, or violent ideation Neuro  no focal weakness,incoordination, ataxia, involuntary movements Endo - no polyuria, polydipsia, nocturia, hot flashes DAST Score: PHYSICAL EXAM: 
Visit Vitals  BP (!) 148/96 (BP 1 Location: Right arm, BP Patient Position: Sitting)  Pulse (!) 126  Temp 99 °F (37.2 °C) (Oral)  Resp 14  
 Ht 5' 1\" (1.549 m)  Wt 74.8 kg (165 lb)  BMI 31.18 kg/m2 GENERAL: W/D, Obese, who appears to be uncomfortable, . APPEARANCE: Well groomed, Appropriately Dressed. ATTITUDE: Pleasant, Cooperative. SPEECH: Slowed Rate, Clear, Pressured. AFFECT: Appropriate, Sad, Worried. MOOD:  Dysthymic. THOUGHT PROCESS: Linear, Logical, Normal Judgement and Insight. THOUGHT CONTENT: Normal 
MEMORY: Poor. HEENT -- NCAT, Anicteric sclerae. Mus/Skel--  bulk and tone appear normal. 
Derm-- no obvious abnormalities noted. PHQ over the last two weeks 10/15/2018 PHQ Not Done - Little interest or pleasure in doing things Not at all Feeling down, depressed, irritable, or hopeless Not at all Total Score PHQ 2 0 Trouble falling or staying asleep, or sleeping too much - Feeling tired or having little energy - Poor appetite, weight loss, or overeating - Feeling bad about yourself - or that you are a failure or have let yourself or your family down - Trouble concentrating on things such as school, work, reading, or watching TV - Moving or speaking so slowly that other people could have noticed; or the opposite being so fidgety that others notice - Thoughts of being better off dead, or hurting yourself in some way -  
PHQ 9 Score - How difficult have these problems made it for you to do your work, take care of your home and get along with others - Results for orders placed or performed during the hospital encounter of 07/19/18 EKG, 12 LEAD, INITIAL Result Value Ref Range Ventricular Rate 95 BPM  
 Atrial Rate 95 BPM  
 P-R Interval 148 ms QRS Duration 80 ms Q-T Interval 366 ms  
 QTC Calculation (Bezet) 459 ms Calculated P Axis 31 degrees Calculated R Axis 25 degrees Calculated T Axis -3 degrees Diagnosis Normal sinus rhythm Low voltage QRS in precordial leads Nonspecific T wave abnormality Borderline EKG When compared with ECG of 25-MAR-2014 08:41, No significant change was found Confirmed by Rishi Young M.D., Ivan Cullen ([id number]) on 7/20/2018 8:43:46 AM 
  
 
 
  
SUBSTANCE DEPENDENCE DISORDER ASSESSMENT: 
 
1. Tolerance, as defined by either of the following: A. A need for increasing amounts of the substance to achieve desired effect or intoxication. Yes B. Diminishing effect with continued use of the same amount of the substance. Yes 
 
2. Withdrawal, as manifested by either of the following: A. The characteristic withdrawal syndrome. No 
   B. The same (or closely related) substance is taken to prevent or relieve withdrawal symptoms. No 
 
3. The substance is often taken in larger amounts or over a longer period of time than recommended or needed. No 
 
4. There is a persistent desire or unsuccessful attempts to reduce or control substance use. Yes 5. Significant time is spent in activities necessary to obtain the substance, use the substance, or recover from its effects. No 
 
6. Important social, occupational, or recreational activities are sacrificed or reduced due to substance use. No 
 
7. The substance use is continued despite awareness of a persistent or recurrent physical or psychological issue likely to have been caused or exacerbated by the substance. Yes Having met 3 or more of the above criteria, patient demonstrates Substance/Opioid Dependence. Yes Substance/Opioid Dependence is hereby established. Yes Patient has given informed consent for treatment with Buprenorphine/Naloxone, and other medication deemed appropriate to aid in her care. Yes Self-Induction dosing explained in detail to the patient. Yes Treatment and program requirements discussed and explained in detail with the patient. Yes Clinic policies reviewed. Yes Pt is to avoid further use of any controlled or illicit substances which have not been cleared for use without clinic notification. She is to stop her MTD immediately. She is to be treated for 3 days with a course of shorter acting opioids( Oxycodone IR 15 mg qid prn, reducing her MMEq to 90) then to d/c them on day 3, and to initiate treatment with Suboxone 8 hours after last dose of oxycodone. [place]  reviewed, is appropriate, and positive for only prescribed medications. Yes:   
 
 
 
Reviewed with patient the treatment plan, goals of treatment plan, and limitations of treatment plan, to include the potential for side effects from medications and procedures. If side effects occur, it is the responsibility of the patient to inform the clinic so that a change in the treatment plan can be made in a safe manner. The patient is advised that stopping prescribed medication may cause an increase in symptoms and possible medication withdrawal symptoms. The patient is informed an emergency room evaluation may be necessary if this occurs. Diagnoses: 
Problem List Items Addressed This Visit Chronic pain syndrome Encounter for long-term (current) use of high-risk medication - Primary Relevant Medications  
 oxyCODONE IR (OXY-IR) 15 mg immediate release tablet  
 buprenorphine-naloxone (SUBOXONE) 8-2 mg film sublingaul film Lumbosacral radiculopathy Relevant Medications  
 oxyCODONE IR (OXY-IR) 15 mg immediate release tablet Other Visit Diagnoses Uncomplicated opioid dependence ([facility] [address] Will initiate Treatment with Buprenorphine Relevant Medications  
 oxyCODONE IR (OXY-IR) 15 mg immediate release tablet  
 buprenorphine-naloxone (SUBOXONE) 8-2 mg film sublingaul film Post evaluation MME: N/A Follow-up Disposition: Return in about 1 week (around 10/22/2018) for medication re-evaluation. Patient verbalized understanding and is in agreement with treatment plan as outlined above. All questions answered. I spent 45 minutes with the patient in face-to-face consultation, of which greater than 50% was spent in counseling and coordination of care as described above.   
 
 
 
 
Joie Long MD

## 2018-10-25 DIAGNOSIS — Z76.0 MEDICATION REFILL: ICD-10-CM

## 2018-10-25 RX ORDER — LORAZEPAM 1 MG/1
TABLET ORAL
Qty: 60 TAB | Refills: 0 | Status: SHIPPED | OUTPATIENT
Start: 2018-10-25 | End: 2018-11-26

## 2018-10-29 ENCOUNTER — OFFICE VISIT (OUTPATIENT)
Dept: PAIN MANAGEMENT | Age: 58
End: 2018-10-29

## 2018-10-29 VITALS
TEMPERATURE: 98.9 F | DIASTOLIC BLOOD PRESSURE: 77 MMHG | BODY MASS INDEX: 31.15 KG/M2 | HEIGHT: 61 IN | HEART RATE: 111 BPM | RESPIRATION RATE: 24 BRPM | WEIGHT: 165 LBS | SYSTOLIC BLOOD PRESSURE: 135 MMHG

## 2018-10-29 DIAGNOSIS — G89.4 CHRONIC PAIN SYNDROME: ICD-10-CM

## 2018-10-29 DIAGNOSIS — F11.20 OPIOID USE DISORDER, MODERATE, DEPENDENCE (HCC): ICD-10-CM

## 2018-10-29 DIAGNOSIS — M79.7 FIBROMYALGIA: ICD-10-CM

## 2018-10-29 DIAGNOSIS — Z79.899 ENCOUNTER FOR LONG-TERM (CURRENT) USE OF HIGH-RISK MEDICATION: Primary | ICD-10-CM

## 2018-10-29 DIAGNOSIS — G47.00 INSOMNIA, UNSPECIFIED TYPE: ICD-10-CM

## 2018-10-29 DIAGNOSIS — M15.9 GENERALIZED OA: ICD-10-CM

## 2018-10-29 RX ORDER — MIRTAZAPINE 30 MG/1
TABLET, ORALLY DISINTEGRATING ORAL
Qty: 30 TAB | Refills: 1 | Status: SHIPPED | OUTPATIENT
Start: 2018-10-29 | End: 2020-05-27

## 2018-10-29 RX ORDER — DULOXETIN HYDROCHLORIDE 30 MG/1
30 CAPSULE, DELAYED RELEASE ORAL DAILY
Qty: 30 CAP | Refills: 0 | Status: SHIPPED | OUTPATIENT
Start: 2018-10-29 | End: 2018-11-26 | Stop reason: SDUPTHER

## 2018-10-29 NOTE — PROGRESS NOTES
Today's Date:  10/29/2018 Patient:  Thomos Rubinstein Patient :  [id number] Subjective: Chief Complaint Patient presents with  
 Other  
  suboxone therapy Number of visit(s): 2 HPI: Thomos Rubinstein is a 59 y.o.  female who presents for f/u from 10/15. Pt presenting with her daughter. Pt is unclear as to the time frame, but states she did do the oxy bridge,while clearing the MTD from her system. It is unclear whether she initiated the Suboxone therapy 3 or 4 days after her last MTD dose,  but when she started the Suboxone she became violently ill. She states she tried taking a smaller dose, but experienced the same symptoms. Per her daughter, see has been laying around her home as she lives alone. Has not been eating. She's been dizzy with some confusion. Pt relating taking 20 mg of MTD this am prior to her appointment. She says she didn't know what else to do and was tied of feeling ill. Explained to her and her daughter that her self-medicating was the cause of her initial contract violation, which result in her no longer having the option to receive opioids here. ROS:  
 
General: has been experiencing withdrawal symptoms HEENT: negative for - headaches, or nasal congestion, oral lesions Resp: negative for - cough, sputum production,shortness of breath, or wheezing CV: negative for - chest pain, palpitations GI: negative for - abdominal pain, constipation,  Some diarrhea and nausea/vomiting MSK: difuse myalgias and arthralgias Neuro: negative for - confusion, seizures or weakness Derm: negative for - rash or skin lesion changes Psych: negative for - cravings, anxiety, depression, irritability or mood swings,suicidal, or homicidal ideation PHQ-9: 
Past Medical History:  
Diagnosis Date  Anxiety  Cataract 6/19/15  
 bilat  Chronic sialoadenitis  Degenerative disc disease  Depression  Diabetes ([facility] [address] 2012  Dry eye  Dyslipidemia  Fatigue   
 uses ritalin for this  Fibromyalgia  GERD (gastroesophageal reflux disease)  Headache(784.0)   
 daily, all her life  Hypertension  OCD (obsessive compulsive disorder)  PTSD (post-traumatic stress disorder)  Seizure disorder ([facility] [address]   Thyroid dysfunction  TMJ (temporomandibular joint disorder) right  Viral encephalitis   
 unknown type from mosquito Past Surgical History:  
Procedure Laterality Date  HX  SECTION    
 X 2  
 HX COLONOSCOPY    
 ? f/u  
 HX PARTIAL HYSTERECTOMY Elizabeth Grace MD 
[phone] Current Outpatient Meds and Allergies Current Outpatient Medications Medication Sig Dispense Refill  DULoxetine (CYMBALTA) 30 mg capsule Take 1 Cap by mouth daily. 30 Cap 0  
 mirtazapine (REMERON SOL-TAB) 30 mg disintegrating tablet 1/2 to 1 tab po qhs prn. 30 Tab 1  
 LORazepam (ATIVAN) 1 mg tablet TAKE 1 TABLET BY MOUTH TWICE DAILY AS NEEDED FOR ANXIETY 60 Tab 0  
 docusate sodium (COLACE) 100 mg capsule Take 1 Cap by mouth two (2) times daily as needed for Constipation for up to 90 days. 60 Cap 2  
 simvastatin (ZOCOR) 40 mg tablet TAKE 1 TABLET BY MOUTH EVERY NIGHT AT BEDTIME  Indications: hypercholesterolemia 90 Tab 5  
 topiramate (TOPAMAX) 50 mg tablet Take 3 Tabs by mouth two (2) times a day. Indications: MIGRAINE PREVENTION, seizure 540 Tab 4  
 lidocaine (LIDODERM) 5 % Apply one patch and leave on 12 hours then remove for 12 hours. Indications: pain 30 Patch 1  
 metFORMIN ER (GLUCOPHAGE XR) 750 mg tablet Take 1 Tab by mouth daily. Indications: type 2 diabetes mellitus 90 Tab 4  
 senna-docusate (PERICOLACE) 8.6-50 mg per tablet 1-2 tabs daily as needed for constipation 30 Tab 0  promethazine (PHENERGAN) 25 mg tablet Take 1 Tab by mouth every six (6) hours as needed for Nausea. Indications: nausea 60 Tab 5  atenolol (TENORMIN) 25 mg tablet Take 1 Tab by mouth daily. Indications: hypertension 90 Tab 5  NEXIUM 40 mg capsule TK 1 C PO QD AT 3PM  11  
 oxyCODONE IR (OXY-IR) 15 mg immediate release tablet 1 tab po qid prn 12 Tab 0  
 buprenorphine-naloxone (SUBOXONE) 8-2 mg film sublingaul film 1 film SL every morning, 1/2 film SL every evening. Do not start until at least 8 hours after last Skylar dose  Indications: Opioid Dependence, Opioid Withdrawal Symptoms 7 Film 0  
 cloNIDine HCl (CATAPRES) 0.1 mg tablet TAKE 1 TABLET BY MOUTH TWICE DAILY AS NEEDED FOR OPIOD WITHDRAWAL SYMPTOMS 168 Tab 0  
 TENS Units (TENS 504) dean Please provide patient with a TENS unit to help improve function, improve quality of life, reduce medication use, improve ROM. Multifactorial complex widespread chronic pain with contribution from lumbosacral spondylosis, bilateral greater trochanteric bursitis, bilateral piriformis syndrome, post 1 Device 0  
 methylphenidate (RITALIN) 10 mg tablet Take 1 Tab by mouth three (3) times daily. (Patient taking differently: Take 10 mg by mouth three (3) times daily as needed.) 90 Tab 0  
 OTHER DDS Lumbar Traction Belt Use as directed 1 Device prn Allergies Allergen Reactions  Zanaflex [Tizanidine] Other (comments) Nausea and worsening headache Objective:    
 
VS:   
Vitals:  
 10/29/18 1613 BP: 135/77 Pulse: (!) 111 Resp: 24 Temp: 98.9 °F (37.2 °C) TempSrc: Oral  
Weight: 74.8 kg (165 lb) Height: 5' 1\" (1.549 m) PainSc:   5 Physical Exam 
 
GENERAL: W/D, W/N, anxiousFemale. APPEARANCE: Well groomed, Appropriately Dressed. Clayton Inch ATTITUDE: Pleasant, Cooperative. Clayton Inch SPEECH: Slowed Rate, Clear. Clayton Inch AFFECT: Appropriate, Sad, Worried. MOOD: Dysthymic. Results for orders placed or performed during the hospital encounter of 07/19/18 EKG, 12 LEAD, INITIAL Result Value Ref Range Ventricular Rate 95 BPM  
 Atrial Rate 95 BPM  
 P-R Interval 148 ms QRS Duration 80 ms  
 Q-T Interval 366 ms  
 QTC Calculation (Bezet) 459 ms Calculated P Axis 31 degrees Calculated R Axis 25 degrees Calculated T Axis -3 degrees Diagnosis Normal sinus rhythm Low voltage QRS in precordial leads Nonspecific T wave abnormality Borderline EKG When compared with ECG of 25-MAR-2014 08:41, No significant change was found Confirmed by Tay Cannon M.D., Marylene Mill ([id number]) on 7/20/2018 8:43:46 AM 
  
  
 
POC UDS: Pos/Neg Bup, Benzo, Oxy, Opiates, Cocaine, THC, Amp, MTD, Meth Assessment/Plan & Orders:    
 
Diagnoses: 
 
Problem List Items Addressed This Visit Chronic pain syndrome Relevant Orders REFERRAL TO PHYSICAL THERAPY Encounter for long-term (current) use of high-risk medication - Primary Fibromyalgia Relevant Orders REFERRAL TO PHYSICAL THERAPY Generalized OA Relevant Orders REFERRAL TO PHYSICAL THERAPY Opioid use disorder, moderate, dependence ([address] Other Visit Diagnoses Insomnia, unspecified type Relevant Medications  
 mirtazapine (REMERON SOL-TAB) 30 mg disintegrating tablet Diagnoses and all orders for this visit: 1. Encounter for long-term (current) use of high-risk medication Comments: 
Avoid further opioid use. 2. Chronic pain syndrome Comments: 
trial on Cymbalta, PT Orders: 
-     REFERRAL TO PHYSICAL THERAPY 3. Fibromyalgia Comments: 
trial on Cymbalta Orders: 
-     REFERRAL TO PHYSICAL THERAPY 4. Opioid use disorder, moderate, dependence ([address] Comments: 
Consider restarted Suboxone 5. Generalized OA Comments: 
trial on Cymbalta, PT Orders: 
-     REFERRAL TO PHYSICAL THERAPY 6. Insomnia, unspecified type 
-     mirtazapine (REMERON SOL-TAB) 30 mg disintegrating tablet; 1/2 to 1 tab po qhs prn. Other orders -     DULoxetine (CYMBALTA) 30 mg capsule; Take 1 Cap by mouth daily. Verified patient meets diagnostic criteria for opioid dependence I have reviewed this patient's report generated by the [place] which does not demonstrate aberrancies and inconsistencies with regard to the historical prescribing of controlled medications to this patient by other providers. Reviewed with patient and her daughter  the treatment plan, including assessing whether patient is receiving appropriate psychosocial support. Pt expressing no interest in Psych eval at this time. Reviewed goals of treatment plan, and limitations of treatment plan, to include the potential for side effects from medications and procedures. If side effects occur, it is the responsibility of the patient to inform the clinic so that a change in the treatment plan can be made in a safe manner. Encouraged patient to continue compliance with medication as well as other aspects of the program. 
 
I spent 45 minutes with the patient in face-to-face consultation, of which greater than 50% was spent in counseling and coordination of care as described above. Follow-up Disposition: 
Return in about 1 week (around 11/5/2018) for medication re-evaluation, anxiety and depression, Re-evaluation of Opioid dependence. Patient verbalized understanding and is in agreement with treatment plan as outlined above. All questions answered.  
 
 
 
Fabian Davies MD

## 2018-10-29 NOTE — PATIENT INSTRUCTIONS
Learning About Opioids Introduction Opioids are medicines used to relieve moderate to severe pain. They may be used for a short time for pain, such as after surgery. Or in some cases a doctor might prescribe them for long-term pain. They don't cure a health problem. But they help you manage the pain. Opioids relieve pain by changing the way your body feels pain and the way you feel about pain. Sometimes opioids are used for people who can't take other pain medicines. They may be prescribed if you have heart, kidney, or liver problems. For instance, you may take an opioid instead of nonsteroidal anti-inflammatory drugs (NSAIDs). NSAIDs include ibuprofen (Advil, Motrin) and naproxen (Aleve). Opioids are strong medicines. They can help you manage pain when you use them the right way. But if you misuse them, they can cause serious harm and even death. If you decide to take opioids, here are some things to remember. · Keep your doctor informed. You can get addicted to opioids. The risk is higher if you have a history of substance use. Your doctor will monitor you closely for signs of misuse and addiction and to figure out when you no longer need to take opioids. · Make a treatment plan. The goal of your plan is to be able to function and do the things you need to do, even if you still have some pain. You might be able to manage your pain with other non-opioid options like physical therapy, relaxation, or over-the-counter pain medicines. · Be aware of the side effects. Opioids can cause serious side effects, such as constipation, dry mouth, and nausea. And over time, you may need a higher dose to get pain relief. This is called tolerance. Your body also gets used to opioids. This is called physical dependence. If you suddenly stop taking them, you may have withdrawal symptoms. Examples Opioids or other medicines that contain them include: · Codeine (Tylenol 3). · Hydrocodone (Norco). · Oxycodone (OxyContin, Percocet). Safety tips If you need to take opioids to manage your pain, remember these safety tips. · Follow directions carefully. It's easy to misuse opioids if you take a dose other than what's prescribed by your doctor. This can lead to overdose and even death. Even sharing them with someone they weren't meant for is misuse. · Be cautious. Opioids may affect your judgment and decision making. Do not drive or operate machinery until you can think clearly. Talk with your doctor about when it is safe to drive. · Reduce the risk of drug interactions. Opioids can be dangerous if you take them with alcohol or with certain drugs like sleeping pills and muscle relaxers. Make sure your doctor knows about all the other medicines you take, including over-the-counter medicines. Don't start any new medicines before you talk to your doctor or pharmacist. 
· Safely store and dispose of opioids. Store opioids in a safe and secure place. Make sure that pets, children, friends, and family can't get to them. When you're done using opioids, make sure to dispose of them safely and as quickly as possible. The U.S. Food and Drug Administration (FDA) recommends these disposal options. ? The best option is to take your medicine to a drop-off box or take-back program that is authorized by the [address] ([facility]). ? If these programs aren't available in your area and your medicine doesn't have specific disposal instructions (such as flushing), you can throw them into your household trash if you follow the FDA's instructions. Visit fda.gov and search for \"unused medicine disposal.\" 
? If you have opioid patches (used or unused), your options are to take them to a [facility]-authorized site or flush them down the toilet. Do not throw them in the trash. ? Only flush your medicine down the toilet if you can't get to a [facility]-approved site or your medicine instructions state clearly to flush them. · Reduce the risk of overdose. Misuse of opioids can be very dangerous. Protect yourself by asking your doctor about a naloxone rescue kit. It can help youand even save your lifeif you take too much of an opioid. Side effects Common side effects include: 
· Constipation. · Feeling dizzy or lightheaded. You may feel like you might faint. · Feeling sleepy. · Nausea or vomiting. You may have other side effects or reactions. Check the information that comes with your medicine. When should you call for help? Call 911 anytime you think you may need emergency care. For example, call if: 
· You have symptoms of a severe allergic reaction. These may include: 
? Sudden raised, red areas (hives) all over your body. ? Swelling of the throat, mouth, lips, or tongue. ? Trouble breathing. ? Passing out (losing consciousness). Or you may feel very lightheaded or suddenly feel weak, confused, or restless. · You have signs of an overdose. These include: 
? Cold, clammy skin. ? Confusion. ? Severe nervousness or restlessness. ? Severe dizziness, drowsiness, or weakness. ? Slow breathing. ? Seizures. Call your doctor now or seek immediate medical care if: 
· You have symptoms of an allergic reaction, such as: ? A rash or hives (raised, red areas on the skin). ? Itching. ? Swelling. ? Belly pain, nausea, or vomiting. Watch closely for changes in your health, and be sure to contact your doctor if: 
· Your medicine is not helping with the pain. · You are having side effects, such as constipation. Where can you learn more? Go to http://mary-consuelo.info/. Enter C390 in the search box to learn more about \"Learning About Opioids. \" Current as of: June 4, 2018 Content Version: 11.8 © 8591-7548 [employer].  Care instructions adapted under license by [employer] (which disclaims liability or warranty for this information). If you have questions about a medical condition or this instruction, always ask your healthcare professional. [place] [address] any warranty or liability for your use of this information.

## 2018-10-29 NOTE — PROGRESS NOTES
Nursing Notes Patient presents to the office today in follow-up. Patient rates her pain at 5/10 on the numerical pain scale. Reviewed medications with counts as follows:   
Rx Date filled Qty Dispensed Pill Count Last Dose Short  
 
suboxone therapy Last opioid agreement suboxone therapy Last urine drug screen 06/2018 Comments: POC UDS was not performed in office today Any new labs or imaging since last appointment? NO Have you been to an emergency room (ER) or urgent care clinic since your last visit? NO Have you been hospitalized since your last visit? NO If yes, where, when, and reason for visit? Have you seen or consulted any other health care providers outside of the [address] [place] [address]  since your last visit? NO If yes, where, when, and reason for visit? Ms. Maryjo Gross has a reminder for a \"due or due soon\" health maintenance. I have asked that she contact her primary care provider for follow-up on this health maintenance. PHQ over the last two weeks 10/29/2018 PHQ Not Done - Little interest or pleasure in doing things Nearly every day Feeling down, depressed, irritable, or hopeless Nearly every day Total Score PHQ 2 6 Trouble falling or staying asleep, or sleeping too much Nearly every day Feeling tired or having little energy Nearly every day Poor appetite, weight loss, or overeating Nearly every day Feeling bad about yourself - or that you are a failure or have let yourself or your family down Not at all Trouble concentrating on things such as school, work, reading, or watching TV Not at all Moving or speaking so slowly that other people could have noticed; or the opposite being so fidgety that others notice Nearly every day Thoughts of being better off dead, or hurting yourself in some way Not at all PHQ 9 Score 18  
 How difficult have these problems made it for you to do your work, take care of your home and get along with others Extremely difficult

## 2018-11-06 ENCOUNTER — TELEPHONE (OUTPATIENT)
Dept: INTERNAL MEDICINE CLINIC | Age: 58
End: 2018-11-06

## 2018-11-06 NOTE — TELEPHONE ENCOUNTER
2 pt. Identifiers confirmed. Pt. C/o nausea, HA, anxiety, some tremors only with increased pain (not continuous). Pt. Denies any CP, heart palp, SOB, dizziness, visual/auditory disturbances. She states she was on methadone for 15years, but was taken of 3 wks ago by pain mngmt. Pt. Was advised to go to the ED/call 911 if she develops any CP, SOB, heart palp but in the meantime she should call the pain clinic to f/u c them about her meds and s/s. Pt. Verbalized understanding and was transferred to  to reschedule her aptmt c Dr. Ike Metzger per her request. She states that her daughter will not be able to bring her and that she is too weak to stay up that long. No other questions/concerns at this time.

## 2018-11-06 NOTE — TELEPHONE ENCOUNTER
Ms. Levar Austin has extreme anxiety. She voiced her concerns at last appt about coming off of methadone. I suspect some of her sxs are anxiety related. But, pain sometimes gets a little worse in the early days after coming off opiates. It usually then improves. I will not write methadone for her should she want to come here.  She should be past the usual amount of time for opiate withdrawal.

## 2018-11-07 ENCOUNTER — TELEPHONE (OUTPATIENT)
Dept: PAIN MANAGEMENT | Age: 58
End: 2018-11-07

## 2018-11-07 NOTE — TELEPHONE ENCOUNTER
Patient called the triage line and stated that she is having withdrawal symptoms stating that she is not able to sleep and has a very bad headache, she states she has not eaten in three weeks. Upon speaking with MD Edson Pearson he advised me to inform her that she needs to seek ER Tx and to see if she can make an appt here early on Friday to see MD Edson Pearson. The patient was called and informed that she needs to seek ER [place] for the symptoms that she's having . The patient was informed of this and encouraged to come in to the office on Friday to see MD Dacosta to discuss the treatment plan. The patient stated \" I am not coming over there anymore. I need my medication. \"  I asked again will you be in on Friday the patient said again she would not ever come back here. The patient and I hung the phone up.

## 2018-11-26 ENCOUNTER — OFFICE VISIT (OUTPATIENT)
Dept: INTERNAL MEDICINE CLINIC | Age: 58
End: 2018-11-26

## 2018-11-26 ENCOUNTER — HOSPITAL ENCOUNTER (OUTPATIENT)
Dept: LAB | Age: 58
Discharge: HOME | End: 2018-11-26
Payer: MEDICARE

## 2018-11-26 VITALS
WEIGHT: 155 LBS | RESPIRATION RATE: 16 BRPM | HEART RATE: 125 BPM | OXYGEN SATURATION: 94 % | HEIGHT: 61 IN | SYSTOLIC BLOOD PRESSURE: 138 MMHG | TEMPERATURE: 98.6 F | DIASTOLIC BLOOD PRESSURE: 96 MMHG | BODY MASS INDEX: 29.27 KG/M2

## 2018-11-26 DIAGNOSIS — R39.198 DIFFICULTY URINATING: ICD-10-CM

## 2018-11-26 DIAGNOSIS — M47.817 LUMBOSACRAL SPONDYLOSIS WITHOUT MYELOPATHY: ICD-10-CM

## 2018-11-26 DIAGNOSIS — M51.37 DEGENERATION OF LUMBAR OR LUMBOSACRAL INTERVERTEBRAL DISC: ICD-10-CM

## 2018-11-26 DIAGNOSIS — Z76.0 MEDICATION REFILL: ICD-10-CM

## 2018-11-26 DIAGNOSIS — M46.1 SACROILIITIS, NOT ELSEWHERE CLASSIFIED (HCC): ICD-10-CM

## 2018-11-26 DIAGNOSIS — M79.7 FIBROMYALGIA: ICD-10-CM

## 2018-11-26 DIAGNOSIS — E11.9 TYPE 2 DIABETES MELLITUS WITHOUT COMPLICATION, WITHOUT LONG-TERM CURRENT USE OF INSULIN (HCC): Chronic | ICD-10-CM

## 2018-11-26 DIAGNOSIS — E11.9 TYPE 2 DIABETES MELLITUS WITHOUT COMPLICATION, WITHOUT LONG-TERM CURRENT USE OF INSULIN (HCC): Primary | Chronic | ICD-10-CM

## 2018-11-26 LAB
BILIRUB UR QL STRIP: NEGATIVE
GLUCOSE UR-MCNC: NEGATIVE MG/DL
KETONES P FAST UR STRIP-MCNC: NORMAL MG/DL
PH UR STRIP: 5.5 [PH] (ref 4.6–8)
PROT UR QL STRIP: NORMAL
SP GR UR STRIP: 1.02 (ref 1–1.03)
UA UROBILINOGEN AMB POC: NORMAL (ref 0.2–1)
URINALYSIS CLARITY POC: CLEAR
URINALYSIS COLOR POC: YELLOW
URINE BLOOD POC: NEGATIVE
URINE LEUKOCYTES POC: NORMAL
URINE NITRITES POC: NEGATIVE

## 2018-11-26 PROCEDURE — 80053 COMPREHEN METABOLIC PANEL: CPT

## 2018-11-26 PROCEDURE — 83036 HEMOGLOBIN GLYCOSYLATED A1C: CPT

## 2018-11-26 PROCEDURE — 80061 LIPID PANEL: CPT

## 2018-11-26 PROCEDURE — 82550 ASSAY OF CK (CPK): CPT

## 2018-11-26 PROCEDURE — 87086 URINE CULTURE/COLONY COUNT: CPT

## 2018-11-26 PROCEDURE — 36415 COLL VENOUS BLD VENIPUNCTURE: CPT

## 2018-11-26 RX ORDER — METHYLPHENIDATE HYDROCHLORIDE 10 MG/1
10 TABLET ORAL
Qty: 90 TAB | Refills: 0 | Status: SHIPPED | OUTPATIENT
Start: 2018-11-26 | End: 2019-06-13 | Stop reason: SDUPTHER

## 2018-11-26 RX ORDER — METHYLPHENIDATE HYDROCHLORIDE 10 MG/1
10 TABLET ORAL 3 TIMES DAILY
Qty: 90 TAB | Refills: 0 | Status: SHIPPED | OUTPATIENT
Start: 2018-11-26 | End: 2019-06-13 | Stop reason: SDUPTHER

## 2018-11-26 RX ORDER — LIDOCAINE 50 MG/G
PATCH TOPICAL
Qty: 30 PATCH | Refills: 5 | Status: ON HOLD | OUTPATIENT
Start: 2018-11-26 | End: 2019-03-28

## 2018-11-26 RX ORDER — SULFAMETHOXAZOLE AND TRIMETHOPRIM 800; 160 MG/1; MG/1
1 TABLET ORAL 2 TIMES DAILY
Qty: 14 TAB | Refills: 0 | Status: SHIPPED | OUTPATIENT
Start: 2018-11-26 | End: 2018-12-03

## 2018-11-26 RX ORDER — DULOXETIN HYDROCHLORIDE 60 MG/1
CAPSULE, DELAYED RELEASE ORAL
Qty: 90 CAP | Refills: 5 | Status: SHIPPED | OUTPATIENT
Start: 2018-11-26 | End: 2019-06-13 | Stop reason: SDUPTHER

## 2018-11-26 RX ORDER — DULOXETIN HYDROCHLORIDE 60 MG/1
60 CAPSULE, DELAYED RELEASE ORAL DAILY
Qty: 30 CAP | Refills: 5 | Status: SHIPPED | OUTPATIENT
Start: 2018-11-26 | End: 2018-11-26 | Stop reason: SDUPTHER

## 2018-11-26 RX ORDER — TOPIRAMATE 50 MG/1
150 TABLET, FILM COATED ORAL 2 TIMES DAILY
Qty: 540 TAB | Refills: 5 | Status: SHIPPED | OUTPATIENT
Start: 2018-11-26 | End: 2019-06-13 | Stop reason: SDUPTHER

## 2018-11-26 RX ORDER — ATENOLOL 25 MG/1
25 TABLET ORAL DAILY
Qty: 90 TAB | Refills: 5 | Status: SHIPPED | OUTPATIENT
Start: 2018-11-26 | End: 2019-06-13 | Stop reason: SDUPTHER

## 2018-11-26 RX ORDER — LORAZEPAM 2 MG/1
2 TABLET ORAL
Qty: 60 TAB | Refills: 5 | Status: SHIPPED | OUTPATIENT
Start: 2018-11-26 | End: 2019-06-13 | Stop reason: SDUPTHER

## 2018-11-26 NOTE — PROGRESS NOTES
HISTORY OF PRESENT ILLNESS Bjorn Gill is a 59 y.o. female. Visit Vitals BP (!) 138/96 (BP 1 Location: Right arm, BP Patient Position: Sitting) Pulse (!) 125 Temp 98.6 °F (37 °C) (Oral) Resp 16 Ht 5' 1\" (1.549 m) Wt 155 lb (70.3 kg) SpO2 94% BMI 29.29 kg/m² States she went through withdrawal when she was placed on suboxone from methadone. This went on for a month. She has lost some weight as a result She says her simvastatin was stopped. He wanted to stop her ativan as well. So she does not want to go back to pain management Also fell 3 days ago into a coffee table. Her legs had been swollen and felt tight and this caused her to stumble. She hit her face and has a small lump  Just above her nose to the left near her eye brow. Diabetes The history is provided by the patient. This is a chronic problem. The current episode started more than 1 week ago. The problem occurs daily. The problem has not changed since onset. Exacerbated by: diet. The symptoms are relieved by medications (diet). Hypertension The history is provided by the patient. This is a chronic problem. The current episode started more than 1 week ago. The problem has not changed since onset. Associated symptoms include malaise/fatigue. There are no associated agents to hypertension. Risk factors include obesity, hypertension, diabetes mellitus and dyslipidemia. Urinary Burning The history is provided by the patient (states she has had trouble voiding with some burning for a while. ). This is a new problem. The current episode started more than 2 days ago. The problem has not changed since onset. Review of Systems Constitutional: Positive for malaise/fatigue and weight loss. Negative for chills and fever. Genitourinary: Positive for dysuria and urgency. Musculoskeletal: Positive for falls, joint pain and myalgias. Psychiatric/Behavioral: Positive for depression.  The patient is nervous/anxious. Physical Exam  
Constitutional: She is oriented to person, place, and time. She appears well-developed and well-nourished. No distress. Cardiovascular: Normal rate and regular rhythm. Pulmonary/Chest: Effort normal.  
Musculoskeletal: She exhibits edema (trace). Neurological: She is alert and oriented to person, place, and time. Skin: Skin is warm and dry. She is not diaphoretic. Nursing note and vitals reviewed. ASSESSMENT and PLAN 
  ICD-10-CM ICD-9-CM 1. Type 2 diabetes mellitus without complication, without long-term current use of insulin ([facility]) R69.2 011.28 METABOLIC PANEL, COMPREHENSIVE  
   LIPID PANEL  
   HEMOGLOBIN A1C W/O EAG  
   CK 2. Difficulty urinating R39.198 788.99 AMB POC URINALYSIS DIP STICK AUTO W/O MICRO  
   CULTURE, URINE  
   trimethoprim-sulfamethoxazole (BACTRIM DS, SEPTRA DS) 160-800 mg per tablet 3. Fibromyalgia M79.7 729.1 REFERRAL TO PAIN MANAGEMENT  
   methylphenidate HCl (RITALIN) 10 mg tablet DULoxetine (CYMBALTA) 60 mg capsule  
   lidocaine (LIDODERM) 5 %  
   methylphenidate HCl (RITALIN) 10 mg tablet  
   methylphenidate HCl (RITALIN) 10 mg tablet  
   topiramate (TOPAMAX) 50 mg tablet CK 4. Degeneration of lumbar or lumbosacral intervertebral disc M51.37 722.52 REFERRAL TO PAIN MANAGEMENT 5. Lumbosacral spondylosis without myelopathy M47.817 721.3 REFERRAL TO PAIN MANAGEMENT DULoxetine (CYMBALTA) 60 mg capsule  
   lidocaine (LIDODERM) 5 %  
   topiramate (TOPAMAX) 50 mg tablet 6. Sacroiliitis, not elsewhere classified ([facility]) M46.1 720.2 DULoxetine (CYMBALTA) 60 mg capsule  
   lidocaine (LIDODERM) 5 %  
   topiramate (TOPAMAX) 50 mg tablet 7. Medication refill Z76.0 V68.1 LORazepam (ATIVAN) 2 mg tablet  
   atenolol (TENORMIN) 25 mg tablet  
   methylphenidate HCl (RITALIN) 10 mg tablet DULoxetine (CYMBALTA) 60 mg capsule  
   lidocaine (LIDODERM) 5 %  
   methylphenidate HCl (RITALIN) 10 mg tablet methylphenidate HCl (RITALIN) 10 mg tablet  
   topiramate (TOPAMAX) 50 mg tablet Urine dip suggestive of UTI--will send bactrim 
send out culture Refills as noted--inc Ritalin which is also used for her depression as adjunctive treatment Refer to [facility] pain management Adjust cymbalta upward OK to try off statin for now to see if that has any effect on her muscles. Restart in 6 weeks and see if there is any difference BP up today. ? Stress. Watch for now F/u 3 months  For med refills

## 2018-11-26 NOTE — PROGRESS NOTES
ROOM # 6 Marquise Hernandez presents today for Chief Complaint Patient presents with  Follow Up Chronic Condition 4 mo f/u cholesterol, diabetes, HTN  
 Dysuria  
  x 2 weeks with low left back pain  Medication Refill Lorazapam, Metformin, Simvistatin, Phenergan, Atenolol, Ritalin, Cymbalta, Lidocaine Patches, Flexeril Marquise Hernandez preferred language for health care discussion is english/other. Is someone accompanying this pt? NO Is the patient using any DME equipment during OV? NO Depression Screening: PHQ over the last two weeks 11/26/2018 10/29/2018 10/15/2018 10/4/2018 8/30/2018 8/2/2018 6/25/2018 PHQ Not Done - - - - - Active Diagnosis of Depression or Bipolar Disorder - Little interest or pleasure in doing things Not at all Nearly every day Not at all More than half the days Not at all - Not at all Feeling down, depressed, irritable, or hopeless Not at all Nearly every day Not at all Not at all Not at all - Several days Total Score PHQ 2 0 6 0 2 0 - 1 Trouble falling or staying asleep, or sleeping too much - Nearly every day - Nearly every day - - - Feeling tired or having little energy - Nearly every day - Nearly every day - - - Poor appetite, weight loss, or overeating - Nearly every day - Nearly every day - - - Feeling bad about yourself - or that you are a failure or have let yourself or your family down - Not at all - Not at all - - - Trouble concentrating on things such as school, work, reading, or watching TV - Not at all - Several days - - - Moving or speaking so slowly that other people could have noticed; or the opposite being so fidgety that others notice - Nearly every day - Not at all - - - Thoughts of being better off dead, or hurting yourself in some way - Not at all - Not at all - - -  
PHQ 9 Score - 18 - 12 - - - How difficult have these problems made it for you to do your work, take care of your home and get along with others - Extremely difficult - Very difficult - - - Learning Assessment: 
Learning Assessment 1/29/2018 10/25/2017 4/1/2015 2/6/2015 1/27/2014 PRIMARY LEARNER Patient Patient Patient Patient Patient HIGHEST LEVEL OF EDUCATION - PRIMARY LEARNER  DID NOT GRADUATE HIGH SCHOOL DID NOT GRADUATE HIGH SCHOOL - - -  
BARRIERS PRIMARY LEARNER NONE NONE - - -  
CO-LEARNER CAREGIVER No No - - - PRIMARY LANGUAGE ENGLISH ENGLISH ENGLISH ENGLISH ENGLISH  
LEARNER PREFERENCE PRIMARY DEMONSTRATION DEMONSTRATION LISTENING LISTENING DEMONSTRATION  
  - - - READING -  
ANSWERED BY self self patient patient patient RELATIONSHIP SELF SELF SELF SELF SELF Abuse Screening: 
Abuse Screening Questionnaire 1/29/2018 7/24/2017 4/13/2015 Do you ever feel afraid of your partner? N N N Are you in a relationship with someone who physically or mentally threatens you? N N N Is it safe for you to go home? [employer] Fall Risk Fall Risk Assessment, last 12 mths 4/4/2018 1/29/2018 7/24/2017 1/27/2014 Able to walk? Yes Yes Yes Yes Fall in past 12 months? No No No No  
 
 
Health Maintenance reviewed and discussed per provider. Yes Joseluis Plunkett is due for Health Maintenance Due Topic Date Due  Pneumococcal 19-64 Medium Risk (1 of 1 - PPSV23) 03/18/1977  Shingrix Vaccine Age 50> (1 of 2) 03/18/2008  COLONOSCOPY  01/20/2017  
 EYE EXAM RETINAL OR DILATED Q1  03/14/2018  Influenza Age 5 to Adult  08/01/2018  BREAST CANCER SCRN MAMMOGRAM  01/17/2019 Please order/place referral if appropriate. Advance Directive: 1. Do you have an advance directive in place? Patient Reply: NO 
 
2. If not, would you like material regarding how to put one in place? Patient Reply: NO 
 
Coordination of Care: 1. Have you been to the ER, urgent care clinic since your last visit? Hospitalized since your last visit?  NO 
 
 2. Have you seen or consulted any other health care providers outside of the [facility] since your last visit? Include any pap smears or colon screening. [facility]

## 2018-11-27 LAB
ALBUMIN SERPL-MCNC: 3.7 G/DL (ref 3.4–5)
ALBUMIN/GLOB SERPL: 1.3 {RATIO} (ref 0.8–1.7)
ALP SERPL-CCNC: 47 U/L (ref 45–117)
ALT SERPL-CCNC: 21 U/L (ref 13–56)
ANION GAP SERPL CALC-SCNC: 8 MMOL/L (ref 3–18)
AST SERPL-CCNC: 14 U/L (ref 15–37)
BILIRUB SERPL-MCNC: 0.3 MG/DL (ref 0.2–1)
BUN SERPL-MCNC: 8 MG/DL (ref 7–18)
BUN/CREAT SERPL: 8 (ref 12–20)
CALCIUM SERPL-MCNC: 8.9 MG/DL (ref 8.5–10.1)
CHLORIDE SERPL-SCNC: 109 MMOL/L (ref 100–108)
CHOLEST SERPL-MCNC: 113 MG/DL
CK SERPL-CCNC: 64 U/L (ref 26–192)
CO2 SERPL-SCNC: 24 MMOL/L (ref 21–32)
CREAT SERPL-MCNC: 0.98 MG/DL (ref 0.6–1.3)
GLOBULIN SER CALC-MCNC: 2.8 G/DL (ref 2–4)
GLUCOSE SERPL-MCNC: 110 MG/DL (ref 74–99)
HBA1C MFR BLD: 8 % (ref 4.2–5.6)
HDLC SERPL-MCNC: 40 MG/DL (ref 40–60)
HDLC SERPL: 2.8 {RATIO} (ref 0–5)
LDLC SERPL CALC-MCNC: 41 MG/DL (ref 0–100)
LIPID PROFILE,FLP: ABNORMAL
POTASSIUM SERPL-SCNC: 3.5 MMOL/L (ref 3.5–5.5)
PROT SERPL-MCNC: 6.5 G/DL (ref 6.4–8.2)
SODIUM SERPL-SCNC: 141 MMOL/L (ref 136–145)
TRIGL SERPL-MCNC: 160 MG/DL (ref ?–150)
VLDLC SERPL CALC-MCNC: 32 MG/DL

## 2018-11-29 LAB
BACTERIA SPEC CULT: NORMAL
SERVICE CMNT-IMP: NORMAL

## 2019-01-07 RX ORDER — CLONIDINE HYDROCHLORIDE 0.1 MG/1
TABLET ORAL
Qty: 168 TAB | Refills: 0 | Status: SHIPPED | OUTPATIENT
Start: 2019-01-07 | End: 2020-05-27

## 2019-03-28 PROBLEM — R29.6 FREQUENT FALLS: Status: ACTIVE | Noted: 2019-03-28

## 2019-03-28 PROBLEM — M79.601 RIGHT ARM PAIN: Status: ACTIVE | Noted: 2019-03-28

## 2019-06-13 ENCOUNTER — OFFICE VISIT (OUTPATIENT)
Dept: INTERNAL MEDICINE CLINIC | Age: 59
End: 2019-06-13

## 2019-06-13 VITALS
DIASTOLIC BLOOD PRESSURE: 83 MMHG | OXYGEN SATURATION: 98 % | RESPIRATION RATE: 20 BRPM | HEART RATE: 79 BPM | SYSTOLIC BLOOD PRESSURE: 143 MMHG | BODY MASS INDEX: 24.73 KG/M2 | TEMPERATURE: 98.6 F | HEIGHT: 61 IN | WEIGHT: 131 LBS

## 2019-06-13 DIAGNOSIS — M47.817 LUMBOSACRAL SPONDYLOSIS WITHOUT MYELOPATHY: ICD-10-CM

## 2019-06-13 DIAGNOSIS — M79.7 FIBROMYALGIA: ICD-10-CM

## 2019-06-13 DIAGNOSIS — Z12.31 ENCOUNTER FOR SCREENING MAMMOGRAM FOR MALIGNANT NEOPLASM OF BREAST: ICD-10-CM

## 2019-06-13 DIAGNOSIS — M25.511 ACUTE PAIN OF RIGHT SHOULDER: ICD-10-CM

## 2019-06-13 DIAGNOSIS — Z12.11 SCREEN FOR COLON CANCER: ICD-10-CM

## 2019-06-13 DIAGNOSIS — Z76.0 MEDICATION REFILL: ICD-10-CM

## 2019-06-13 DIAGNOSIS — Z23 ENCOUNTER FOR IMMUNIZATION: ICD-10-CM

## 2019-06-13 DIAGNOSIS — G54.0 BRACHIAL PLEXOPATHY: ICD-10-CM

## 2019-06-13 DIAGNOSIS — M46.1 SACROILIITIS, NOT ELSEWHERE CLASSIFIED (HCC): ICD-10-CM

## 2019-06-13 DIAGNOSIS — Z00.00 MEDICARE ANNUAL WELLNESS VISIT, SUBSEQUENT: Primary | ICD-10-CM

## 2019-06-13 DIAGNOSIS — G44.89 OTHER HEADACHE SYNDROME: ICD-10-CM

## 2019-06-13 RX ORDER — METFORMIN HYDROCHLORIDE 750 MG/1
750 TABLET, EXTENDED RELEASE ORAL DAILY
Qty: 90 TAB | Refills: 4 | Status: SHIPPED | OUTPATIENT
Start: 2019-06-13 | End: 2020-09-13

## 2019-06-13 RX ORDER — LORAZEPAM 2 MG/1
2 TABLET ORAL
Qty: 60 TAB | Refills: 5 | Status: SHIPPED | OUTPATIENT
Start: 2019-06-13 | End: 2019-09-17 | Stop reason: SDUPTHER

## 2019-06-13 RX ORDER — PROMETHAZINE HYDROCHLORIDE 25 MG/1
25 TABLET ORAL
Qty: 60 TAB | Refills: 5 | Status: SHIPPED | OUTPATIENT
Start: 2019-06-13 | End: 2019-09-17 | Stop reason: SDUPTHER

## 2019-06-13 RX ORDER — CYCLOBENZAPRINE HCL 10 MG
10 TABLET ORAL
COMMUNITY
End: 2019-06-13 | Stop reason: SDUPTHER

## 2019-06-13 RX ORDER — ATENOLOL 25 MG/1
25 TABLET ORAL DAILY
Qty: 90 TAB | Refills: 4 | Status: SHIPPED | OUTPATIENT
Start: 2019-06-13 | End: 2019-12-17 | Stop reason: SDUPTHER

## 2019-06-13 RX ORDER — METHYLPHENIDATE HYDROCHLORIDE 10 MG/1
10 TABLET ORAL 3 TIMES DAILY
Qty: 90 TAB | Refills: 0 | Status: SHIPPED | OUTPATIENT
Start: 2019-07-11 | End: 2019-09-17 | Stop reason: SDUPTHER

## 2019-06-13 RX ORDER — DULOXETIN HYDROCHLORIDE 60 MG/1
60 CAPSULE, DELAYED RELEASE ORAL DAILY
Qty: 90 CAP | Refills: 4 | Status: SHIPPED | OUTPATIENT
Start: 2019-06-13 | End: 2020-07-01

## 2019-06-13 RX ORDER — METHYLPHENIDATE HYDROCHLORIDE 10 MG/1
10 TABLET ORAL 3 TIMES DAILY
Qty: 90 TAB | Refills: 0 | Status: SHIPPED | OUTPATIENT
Start: 2019-08-09 | End: 2019-09-17 | Stop reason: SDUPTHER

## 2019-06-13 RX ORDER — CYCLOBENZAPRINE HCL 10 MG
10 TABLET ORAL 2 TIMES DAILY
Qty: 180 TAB | Refills: 5 | Status: SHIPPED | OUTPATIENT
Start: 2019-06-13 | End: 2020-07-05

## 2019-06-13 RX ORDER — TOPIRAMATE 50 MG/1
150 TABLET, FILM COATED ORAL 2 TIMES DAILY
Qty: 540 TAB | Refills: 4 | Status: SHIPPED | OUTPATIENT
Start: 2019-06-13 | End: 2019-12-05

## 2019-06-13 RX ORDER — SIMVASTATIN 40 MG/1
TABLET, FILM COATED ORAL
Qty: 90 TAB | Refills: 4 | Status: SHIPPED | OUTPATIENT
Start: 2019-06-13 | End: 2020-04-28 | Stop reason: SDUPTHER

## 2019-06-13 RX ORDER — METHYLPHENIDATE HYDROCHLORIDE 10 MG/1
10 TABLET ORAL
Qty: 90 TAB | Refills: 0 | Status: SHIPPED | OUTPATIENT
Start: 2019-06-13 | End: 2019-09-17 | Stop reason: SDUPTHER

## 2019-06-13 NOTE — PROGRESS NOTES
HISTORY OF PRESENT ILLNESS  Alessandra Crocker is a 62 y.o. female. Visit Vitals  /83 (BP 1 Location: Left arm, BP Patient Position: Sitting)   Pulse 79   Temp 98.6 °F (37 °C) (Oral)   Resp 20   Ht 5' 1\" (1.549 m)   Wt 131 lb (59.4 kg)   SpO2 98%   BMI 24.75 kg/m²             Took herself off methadone. Does not want to go back to pain management    Shoulder Pain    The history is provided by the patient (right shoulder pain. Worse since she had a recent \"stroke\". But it seems to be in the joint. PT thinks there may be a torn muscle). The incident occurred more than 1 week ago. There was no injury mechanism. The right shoulder is affected. The pain is severe. The pain does not radiate. Review of Systems   Constitutional: Negative. Respiratory: Negative for shortness of breath. Cardiovascular: Negative for chest pain and palpitations. Musculoskeletal: Positive for joint pain and myalgias. Neurological: Positive for sensory change (numbness in right thumb and finger) and focal weakness (right arm. Right shoulder pain). Psychiatric/Behavioral: Positive for depression. The patient is nervous/anxious. Physical Exam   Constitutional: She is oriented to person, place, and time. She appears well-developed and well-nourished. No distress. Cardiovascular: Normal rate. Pulmonary/Chest: Effort normal.   Musculoskeletal:        Right shoulder: She exhibits decreased range of motion, tenderness, pain and decreased strength. She exhibits no swelling, no crepitus, no deformity and normal pulse. Neurological: She is alert and oriented to person, place, and time. Has difficulty moving her right arm. Pain is focused in her shoulder. Lower arm tremors when I attempt to flex her elbow. Skin: Skin is warm and dry. She is not diaphoretic. Nursing note and vitals reviewed.       ASSESSMENT and PLAN    ICD-10-CM ICD-9-CM                               4. Screen for colon cancer Z12.11 V76.51 COLOGUARD TEST (FECAL DNA COLORECTAL CANCER SCREENING)   5. Fibromyalgia M79.7 729.1 topiramate (TOPAMAX) 50 mg tablet      DULoxetine (CYMBALTA) 60 mg capsule      methylphenidate HCl (RITALIN) 10 mg tablet      methylphenidate HCl (RITALIN) 10 mg tablet      methylphenidate HCl (RITALIN) 10 mg tablet   6. Acute pain of right shoulder M25.511 719.41 REFERRAL TO ORTHOPEDICS   7. Brachial plexopathy G54.0 353.0    8. Lumbosacral spondylosis without myelopathy M47.817 721.3 topiramate (TOPAMAX) 50 mg tablet      DULoxetine (CYMBALTA) 60 mg capsule   9. Sacroiliitis, not elsewhere classified (HCC) M46.1 720.2 topiramate (TOPAMAX) 50 mg tablet      DULoxetine (CYMBALTA) 60 mg capsule   10. Other headache syndrome G44.89 339.89    11. Medication refill Z76.0 V68.1 atenolol (TENORMIN) 25 mg tablet      topiramate (TOPAMAX) 50 mg tablet      simvastatin (ZOCOR) 40 mg tablet      LORazepam (ATIVAN) 2 mg tablet      metFORMIN ER (GLUCOPHAGE XR) 750 mg tablet      DULoxetine (CYMBALTA) 60 mg capsule      methylphenidate HCl (RITALIN) 10 mg tablet      methylphenidate HCl (RITALIN) 10 mg tablet      methylphenidate HCl (RITALIN) 10 mg tablet      promethazine (PHENERGAN) 25 mg tablet       She reports her long standing headaches are better on cymbalta    Lab not due today    Refills as noted    Refer to ortho. I suspect biceps or rotator cuff damage. This degree of pain is not c/w a \"stroke. \". Available hospital/ER record reviewed. Was in [facility] 3/28/19-4/7/19. Her diagnosis was right brachial plexopathy due to a fall. Stroke w/u was negative    Unfortunately, pt tends to be histrionic and has had pain issues in the past. She is quite anxious and has a dx of PTSD and OCD. It is difficult to parse out the true pathology here but all indications are shoulder injury. She is in PT currently    F/u 3 month when due for med refills.

## 2019-06-13 NOTE — PROGRESS NOTES
This is the Subsequent Medicare Annual Wellness Exam, performed 12 months or more after the Initial AWV or the last Subsequent AWV    I have reviewed the patient's medical history in detail and updated the computerized patient record. History     Past Medical History:   Diagnosis Date    Anxiety     Cataract 6/19/15    bilat    Chronic sialoadenitis     Degenerative disc disease     Depression     Diabetes ([facility] [address]     Dry eye     Dyslipidemia     Fatigue     uses ritalin for this    Fibromyalgia     GERD (gastroesophageal reflux disease)     Headache(784.0)     daily, all her life    Hypertension     OCD (obsessive compulsive disorder)     PTSD (post-traumatic stress disorder)     Seizure disorder ([facility] [address]     Thyroid dysfunction     TMJ (temporomandibular joint disorder)     right    Viral encephalitis     unknown type from mosquito      Past Surgical History:   Procedure Laterality Date    HX  SECTION      X 2    HX COLONOSCOPY      ? f/u    HX PARTIAL HYSTERECTOMY       Current Outpatient Medications   Medication Sig Dispense Refill    cyclobenzaprine (FLEXERIL) 10 mg tablet Take 10 mg by mouth.  LORazepam (ATIVAN) 2 mg tablet Take 1 Tab by mouth two (2) times daily as needed for Anxiety. Max Daily Amount: 4 mg. 60 Tab 5    atenolol (TENORMIN) 25 mg tablet Take 1 Tab by mouth daily. 90 Tab 5    methylphenidate HCl (RITALIN) 10 mg tablet Take 1 Tab by mouth three (3) times daily as needed. Max Daily Amount: 30 mg. 90 Tab 0    methylphenidate HCl (RITALIN) 10 mg tablet Take 1 Tab by mouth three (3) times daily. Max Daily Amount: 30 mg. 90 Tab 0    methylphenidate HCl (RITALIN) 10 mg tablet Take 1 Tab by mouth three (3) times daily. 90 Tab 0    topiramate (TOPAMAX) 50 mg tablet Take 3 Tabs by mouth two (2) times a day.  540 Tab 5    DULoxetine (CYMBALTA) 60 mg capsule TAKE 1 CAPSULE BY MOUTH DAILY 90 Cap 5    simvastatin (ZOCOR) 40 mg tablet TAKE 1 TABLET BY MOUTH EVERY NIGHT AT BEDTIME  Indications: hypercholesterolemia 90 Tab 5    metFORMIN ER (GLUCOPHAGE XR) 750 mg tablet Take 1 Tab by mouth daily. Indications: type 2 diabetes mellitus 90 Tab 4    cloNIDine HCl (CATAPRES) 0.1 mg tablet TAKE 1 TABLET BY MOUTH TWICE DAILY AS NEEDED FOR OPIOD WITHDRAWAL SYMPTOMS 168 Tab 0    mirtazapine (REMERON SOL-TAB) 30 mg disintegrating tablet 1/2 to 1 tab po qhs prn. 30 Tab 1    TENS Units (TENS 504) dean Please provide patient with a TENS unit to help improve function, improve quality of life, reduce medication use, improve ROM. Multifactorial complex widespread chronic pain with contribution from lumbosacral spondylosis, bilateral greater trochanteric bursitis, bilateral piriformis syndrome, post 1 Device 0    senna-docusate (PERICOLACE) 8.6-50 mg per tablet 1-2 tabs daily as needed for constipation 30 Tab 0    promethazine (PHENERGAN) 25 mg tablet Take 1 Tab by mouth every six (6) hours as needed for Nausea.  Indications: nausea 60 Tab 5    NEXIUM 40 mg capsule TK 1 C PO QD AT 3PM  11    OTHER DDS Lumbar Traction Belt  Use as directed 1 Device prn     Allergies   Allergen Reactions    Zanaflex [Tizanidine] Other (comments)     Nausea and worsening headache     Family History   Problem Relation Age of Onset    Alcohol abuse Father     Diabetes Sister     Migraines Daughter     Diabetes Maternal Aunt     Cancer Maternal Aunt     Diabetes Maternal Uncle     Diabetes Paternal Aunt     Diabetes Paternal Uncle     Heart Attack Maternal Grandmother      Social History     Tobacco Use    Smoking status: Never Smoker    Smokeless tobacco: Never Used   Substance Use Topics    Alcohol use: No     Patient Active Problem List   Diagnosis Code    Degeneration of lumbar or lumbosacral intervertebral disc M51.37    Lumbosacral radiculopathy M54.17    Lumbosacral spondylosis without myelopathy M47.817    Dyslipidemia E78.5    Enthesopathy of hip region M76.899    Essential hypertension I10    Chronic pain G89.29    Fibromyalgia M79.7    Seizure disorder ([facility]) G40.909    H/O viral encephalitis Z86.61    Chronic sialoadenitis K11.23    Type 2 diabetes mellitus without complication, without long-term current use of insulin ([facility]) E11.9    Generalized OA M15.9    Encounter for long-term (current) use of high-risk medication Z79.899    Intractable migraine without status migrainosus G43.919    Bilateral occipital neuralgia M54.81    Abnormal posture R29.3    Chronic pain syndrome G89.4    Opioid use disorder, moderate, dependence ([facility]) F11.20    Trochanteric bursitis of both hips M70.61, M70.62    Type 2 diabetes with nephropathy ([facility]) E11.21    Spondylolisthesis of lumbar region M43.16    Bilateral sciatica M54.31, M54.32    Right arm pain M79.601    Frequent falls R29.6       Depression Risk Factor Screening:     3 most recent PHQ Screens 11/26/2018   PHQ Not Done -   Little interest or pleasure in doing things Not at all   Feeling down, depressed, irritable, or hopeless Not at all   Total Score PHQ 2 0   Trouble falling or staying asleep, or sleeping too much -   Feeling tired or having little energy -   Poor appetite, weight loss, or overeating -   Feeling bad about yourself - or that you are a failure or have let yourself or your family down -   Trouble concentrating on things such as school, work, reading, or watching TV -   Moving or speaking so slowly that other people could have noticed; or the opposite being so fidgety that others notice -   Thoughts of being better off dead, or hurting yourself in some way -   PHQ 9 Score -   How difficult have these problems made it for you to do your work, take care of your home and get along with others -     Alcohol Risk Factor Screening: You do not drink alcohol or very rarely. Functional Ability and Level of Safety:   Hearing Loss  Hearing is good.     Activities of Daily Living  The home contains: lives in a senior apt which is handicap accessible. Has bars and a pull cord  Patient needs help with:  transportation, shopping, dressing and walking    Fall Risk  Fall Risk Assessment, last 12 mths 4/4/2018   Able to walk? Yes   Fall in past 12 months? No       Abuse Screen  Patient is not abused    Cognitive Screening   Evaluation of Cognitive Function:  Has your family/caregiver stated any concerns about your memory: no.   Normal, Mini Cog test.  She is aware of some mental fogging from her medication    Patient Care Team   Patient Care Team:  Denis Choi MD as PCP - General (Internal Medicine)  Varun Kim MD as Physician (Neurology)  Lizette Arenas MD (Pain Management)  Jocelynn Austin MD as Consulting Provider (Otolaryngology)  Scoper, Ashley Squires MD as Physician (Ophthalmology)    Assessment/Plan   Education and counseling provided:  Are appropriate based on today's review and evaluation    Diagnoses and all orders for this visit:    1. Medicare annual wellness visit, subsequent    2. Encounter for screening mammogram for malignant neoplasm of breast  -     [facility] MAMMO BI SCREENING INCL CAD; Future    3. Encounter for immunization  -     ADMIN PNEUMOCOCCAL VACCINE  -     PNEUMOCOCCAL POLYSACCHARIDE VACCINE, 23-VALENT, ADULT OR IMMUNOSUPPRESSED PT DOSE,    4. Screen for colon cancer  -     COLOGUARD TEST (FECAL DNA COLORECTAL CANCER SCREENING)    5. Fibromyalgia  -     topiramate (TOPAMAX) 50 mg tablet; Take 3 Tabs by mouth two (2) times a day. Indications: Migraine Prevention, seizure  -     DULoxetine (CYMBALTA) 60 mg capsule  -     methylphenidate HCl (RITALIN) 10 mg tablet; Take 1 Tab by mouth three (3) times daily as needed. Max Daily Amount: 30 mg.  -     methylphenidate HCl (RITALIN) 10 mg tablet; Take 1 Tab by mouth three (3) times daily. Max Daily Amount: 30 mg.  -     methylphenidate HCl (RITALIN) 10 mg tablet; Take 1 Tab by mouth three (3) times daily.     6. Lumbosacral spondylosis without myelopathy  -     topiramate (TOPAMAX) 50 mg tablet; Take 3 Tabs by mouth two (2) times a day. Indications: Migraine Prevention, seizure  -     DULoxetine (CYMBALTA) 60 mg capsule    7. Sacroiliitis, not elsewhere classified (HCC)  -     topiramate (TOPAMAX) 50 mg tablet; Take 3 Tabs by mouth two (2) times a day. Indications: Migraine Prevention, seizure  -     DULoxetine (CYMBALTA) 60 mg capsule    8. Medication refill  -     atenolol (TENORMIN) 25 mg tablet; Take 1 Tab by mouth daily. Indications: high blood pressure  -     topiramate (TOPAMAX) 50 mg tablet; Take 3 Tabs by mouth two (2) times a day. Indications: Migraine Prevention, seizure  -     simvastatin (ZOCOR) 40 mg tablet; TAKE 1 TABLET BY MOUTH EVERY NIGHT AT BEDTIME  Indications: high cholesterol  -     LORazepam (ATIVAN) 2 mg tablet; Take 1 Tab by mouth two (2) times daily as needed for Anxiety. Max Daily Amount: 4 mg. Indications: anxious  -     metFORMIN ER (GLUCOPHAGE XR) 750 mg tablet; Take 1 Tab by mouth daily. Indications: type 2 diabetes mellitus  -     DULoxetine (CYMBALTA) 60 mg capsule  -     methylphenidate HCl (RITALIN) 10 mg tablet; Take 1 Tab by mouth three (3) times daily as needed. Max Daily Amount: 30 mg.  -     methylphenidate HCl (RITALIN) 10 mg tablet; Take 1 Tab by mouth three (3) times daily. Max Daily Amount: 30 mg.  -     methylphenidate HCl (RITALIN) 10 mg tablet; Take 1 Tab by mouth three (3) times daily. -     promethazine (PHENERGAN) 25 mg tablet; Take 1 Tab by mouth every six (6) hours as needed for Nausea. Indications: nausea    Other orders  -     cyclobenzaprine (FLEXERIL) 10 mg tablet; Take 1 Tab by mouth.         Health Maintenance Due   Topic Date Due    Shingrix Vaccine Age 50> (1 of 2) 03/18/2008    COLONOSCOPY  01/20/2017    Pneumococcal 0-64 years (1 of 1 - PPSV23) 02/09/2017    EYE EXAM RETINAL OR DILATED  06/19/2017    MEDICARE YEARLY EXAM  12/27/2018    BREAST CANCER SCRN MAMMOGRAM  01/17/2019    FOOT EXAM Q1  07/09/2019    MICROALBUMIN Q1  07/09/2019

## 2019-06-13 NOTE — PATIENT INSTRUCTIONS
Medicare Wellness Visit, Female The best way to live healthy is to have a lifestyle where you eat a well-balanced diet, exercise regularly, limit alcohol use, and quit all forms of tobacco/nicotine, if applicable. Regular preventive services are another way to keep healthy. Preventive services (vaccines, screening tests, monitoring & exams) can help personalize your care plan, which helps you manage your own care. Screening tests can find health problems at the earliest stages, when they are easiest to treat. [facility] follows the current, evidence-based guidelines published by the [facility] ([facility]) when recommending preventive services for our patients. Because we follow these guidelines, sometimes recommendations change over time as research supports it. (For example, mammograms used to be recommended annually. Even though Medicare will still pay for an annual mammogram, the newer guidelines recommend a mammogram every two years for women of average risk.) Of course, you and your doctor may decide to screen more often for some diseases, based on your risk and your health status. Preventive services for you include: - Medicare offers their members a free annual wellness visit, which is time for you and your primary care provider to discuss and plan for your preventive service needs. Take advantage of this benefit every year! 
-All adults over the age of 72 should receive the recommended pneumonia vaccines. Current USPSTF guidelines recommend a series of two vaccines for the best pneumonia protection.  
-All adults should have a flu vaccine yearly and a tetanus vaccine every 10 years. All adults age 61 and older should receive a shingles vaccine once in their lifetime.   
-A bone mass density test is recommended when a woman turns 65 to screen for osteoporosis. This test is only recommended one time, as a screening. Some providers will use this same test as a disease monitoring tool if you already have osteoporosis. -All adults age 38-68 who are overweight should have a diabetes screening test once every three years.  
-Other screening tests and preventive services for persons with diabetes include: an eye exam to screen for diabetic retinopathy, a kidney function test, a foot exam, and stricter control over your cholesterol.  
-Cardiovascular screening for adults with routine risk involves an electrocardiogram (ECG) at intervals determined by your doctor.  
-Colorectal cancer screenings should be done for adults age 54-65 with no increased risk factors for colorectal cancer. There are a number of acceptable methods of screening for this type of cancer. Each test has its own benefits and drawbacks. Discuss with your doctor what is most appropriate for you during your annual wellness visit. The different tests include: colonoscopy (considered the best screening method), a fecal occult blood test, a fecal DNA test, and sigmoidoscopy. -Breast cancer screenings are recommended every other year for women of normal risk, age 54-69. 
-Cervical cancer screenings for women over age 72 are only recommended with certain risk factors.  
-All adults born between [facility] should be screened once for Hepatitis C. Here is a list of your current Health Maintenance items (your personalized list of preventive services) with a due date: 
Health Maintenance Due Topic Date Due  Shingles Vaccine (1 of 2) 03/18/2008  Colonoscopy  01/20/2017  Pneumococcal Vaccine (1 of 1 - PPSV23) 02/09/2017 [facility] Eye Exam  06/19/2017 [facility] Annual Well Visit  12/27/2018  Mammogram  01/17/2019 [facility] Diabetic Foot Care  07/09/2019  Albumin Urine Test  07/09/2019

## 2019-06-13 NOTE — PROGRESS NOTES
ROOM # 4    Fallon Pearson presents today for   Chief Complaint   Patient presents with    Medication Refill       Fallon Pearson preferred language for health care discussion is english/other. Is someone accompanying this pt? NO    Is the patient using any DME equipment during OV?  WHEELCHAIR    Depression Screening:  3 most recent PHQ Screens 11/26/2018 10/29/2018 10/15/2018 10/4/2018 8/30/2018 8/2/2018 6/25/2018   PHQ Not Done - - - - - Active Diagnosis of Depression or Bipolar Disorder -   Little interest or pleasure in doing things Not at all Nearly every day Not at all More than half the days Not at all - Not at all   Feeling down, depressed, irritable, or hopeless Not at all Nearly every day Not at all Not at all Not at all - Several days   Total Score PHQ 2 0 6 0 2 0 - 1   Trouble falling or staying asleep, or sleeping too much - Nearly every day - Nearly every day - - -   Feeling tired or having little energy - Nearly every day - Nearly every day - - -   Poor appetite, weight loss, or overeating - Nearly every day - Nearly every day - - -   Feeling bad about yourself - or that you are a failure or have let yourself or your family down - Not at all - Not at all - - -   Trouble concentrating on things such as school, work, reading, or watching TV - Not at all - Several days - - -   Moving or speaking so slowly that other people could have noticed; or the opposite being so fidgety that others notice - Nearly every day - Not at all - - -   Thoughts of being better off dead, or hurting yourself in some way - Not at all - Not at all - - -   PHQ 9 Score - 18 - 12 - - -   How difficult have these problems made it for you to do your work, take care of your home and get along with others - Extremely difficult - Very difficult - - -       Learning Assessment:  Learning Assessment 1/29/2018 10/25/2017 4/1/2015 2/6/2015 1/27/2014   PRIMARY LEARNER Patient Patient Patient Patient Patient   HIGHEST LEVEL OF EDUCATION - PRIMARY LEARNER  DID NOT GRADUATE HIGH SCHOOL DID NOT GRADUATE HIGH SCHOOL - - -   BARRIERS PRIMARY LEARNER NONE NONE - - -   CO-LEARNER CAREGIVER No No - - -   PRIMARY LANGUAGE ENGLISH ENGLISH ENGLISH ENGLISH ENGLISH   LEARNER PREFERENCE PRIMARY DEMONSTRATION DEMONSTRATION LISTENING LISTENING DEMONSTRATION     - - - READING -   ANSWERED BY self self patient patient patient   RELATIONSHIP SELF SELF SELF SELF SELF       Abuse Screening:  Abuse Screening Questionnaire 6/13/2019 1/29/2018 7/24/2017 4/13/2015   Do you ever feel afraid of your partner? N N N N   Are you in a relationship with someone who physically or mentally threatens you? N N N N   Is it safe for you to go home? Bk Anne       Fall Risk  Fall Risk Assessment, last 12 mths 4/4/2018 1/29/2018 7/24/2017 1/27/2014   Able to walk? Yes Yes Yes Yes   Fall in past 12 months? No No No No       Health Maintenance reviewed and discussed per provider. Yes    Dee Parrish is due for   Health Maintenance Due   Topic Date Due    DTaP/Tdap/Td series (1 - Tdap) 03/18/1979    Shingrix Vaccine Age 50> (1 of 2) 03/18/2008    COLONOSCOPY  01/20/2017    Pneumococcal 0-64 years (1 of 1 - PPSV23) 02/09/2017    EYE EXAM RETINAL OR DILATED  06/19/2017    MEDICARE YEARLY EXAM  12/27/2018    BREAST CANCER SCRN MAMMOGRAM  01/17/2019    FOOT EXAM Q1  07/09/2019    MICROALBUMIN Q1  07/09/2019     Please order/place referral if appropriate. Advance Directive:  1. Do you have an advance directive in place? Patient Reply: NO    2. If not, would you like material regarding how to put one in place? Patient Reply: NO    Coordination of Care:  1. Have you been to the ER, urgent care clinic since your last visit? Hospitalized since your last visit? YES    2. Have you seen or consulted any other health care providers outside of the [address] [place] [address] since your last visit? Include any pap smears or colon screening.  NO

## 2019-08-17 DIAGNOSIS — Z76.0 MEDICATION REFILL: ICD-10-CM

## 2019-08-18 RX ORDER — SIMVASTATIN 40 MG/1
TABLET, FILM COATED ORAL
Qty: 90 TAB | Refills: 0 | Status: SHIPPED | OUTPATIENT
Start: 2019-08-18 | End: 2020-02-17 | Stop reason: SDUPTHER

## 2019-09-17 ENCOUNTER — HOSPITAL ENCOUNTER (OUTPATIENT)
Dept: LAB | Age: 59
Discharge: HOME | End: 2019-09-17
Payer: MEDICARE

## 2019-09-17 ENCOUNTER — OFFICE VISIT (OUTPATIENT)
Dept: INTERNAL MEDICINE CLINIC | Age: 59
End: 2019-09-17

## 2019-09-17 VITALS
HEIGHT: 61 IN | SYSTOLIC BLOOD PRESSURE: 124 MMHG | WEIGHT: 122 LBS | BODY MASS INDEX: 23.03 KG/M2 | RESPIRATION RATE: 16 BRPM | OXYGEN SATURATION: 98 % | TEMPERATURE: 99 F | DIASTOLIC BLOOD PRESSURE: 80 MMHG | HEART RATE: 97 BPM

## 2019-09-17 DIAGNOSIS — Z23 ENCOUNTER FOR IMMUNIZATION: ICD-10-CM

## 2019-09-17 DIAGNOSIS — Z12.11 SCREEN FOR COLON CANCER: ICD-10-CM

## 2019-09-17 DIAGNOSIS — I10 ESSENTIAL HYPERTENSION: ICD-10-CM

## 2019-09-17 DIAGNOSIS — E78.5 DYSLIPIDEMIA: ICD-10-CM

## 2019-09-17 DIAGNOSIS — Z76.0 MEDICATION REFILL: ICD-10-CM

## 2019-09-17 DIAGNOSIS — E11.9 TYPE 2 DIABETES MELLITUS WITHOUT COMPLICATION, WITHOUT LONG-TERM CURRENT USE OF INSULIN (HCC): ICD-10-CM

## 2019-09-17 DIAGNOSIS — M79.7 FIBROMYALGIA: ICD-10-CM

## 2019-09-17 DIAGNOSIS — E11.9 TYPE 2 DIABETES MELLITUS WITHOUT COMPLICATION, WITHOUT LONG-TERM CURRENT USE OF INSULIN (HCC): Primary | ICD-10-CM

## 2019-09-17 PROCEDURE — 82043 UR ALBUMIN QUANTITATIVE: CPT

## 2019-09-17 PROCEDURE — 83036 HEMOGLOBIN GLYCOSYLATED A1C: CPT

## 2019-09-17 PROCEDURE — 80053 COMPREHEN METABOLIC PANEL: CPT

## 2019-09-17 PROCEDURE — 36415 COLL VENOUS BLD VENIPUNCTURE: CPT

## 2019-09-17 PROCEDURE — 80061 LIPID PANEL: CPT

## 2019-09-17 RX ORDER — LORAZEPAM 2 MG/1
2 TABLET ORAL
Qty: 60 TAB | Refills: 5 | Status: SHIPPED | OUTPATIENT
Start: 2019-09-17 | End: 2019-12-05 | Stop reason: SDUPTHER

## 2019-09-17 RX ORDER — PROMETHAZINE HYDROCHLORIDE 25 MG/1
25 TABLET ORAL
Qty: 60 TAB | Refills: 5 | Status: SHIPPED | OUTPATIENT
Start: 2019-09-17 | End: 2020-10-14 | Stop reason: SDUPTHER

## 2019-09-17 RX ORDER — METHYLPHENIDATE HYDROCHLORIDE 10 MG/1
10 TABLET ORAL 3 TIMES DAILY
Qty: 90 TAB | Refills: 0 | Status: SHIPPED | OUTPATIENT
Start: 2019-11-13 | End: 2020-02-17 | Stop reason: SDUPTHER

## 2019-09-17 RX ORDER — METHYLPHENIDATE HYDROCHLORIDE 10 MG/1
10 TABLET ORAL
Qty: 90 TAB | Refills: 0 | Status: SHIPPED | OUTPATIENT
Start: 2019-09-17 | End: 2020-02-17 | Stop reason: SDUPTHER

## 2019-09-17 RX ORDER — METHYLPHENIDATE HYDROCHLORIDE 10 MG/1
10 TABLET ORAL 3 TIMES DAILY
Qty: 90 TAB | Refills: 0 | Status: SHIPPED | OUTPATIENT
Start: 2019-10-15 | End: 2020-02-17 | Stop reason: SDUPTHER

## 2019-09-17 NOTE — PROGRESS NOTES
ROOM # 5    Adonna Brochure presents today for   Chief Complaint   Patient presents with    Hypertension    Diabetes    Cholesterol Problem       Adonna Brochure preferred language for health care discussion is english/other.     Is someone accompanying this pt? friend    Is the patient using any DME equipment during [address]? no    Depression Screening:  3 most recent PHQ Screens 11/26/2018 10/29/2018 10/15/2018 10/4/2018 8/30/2018 8/2/2018 6/25/2018   PHQ Not Done - - - - - Active Diagnosis of Depression or Bipolar Disorder -   Little interest or pleasure in doing things Not at all Nearly every day Not at all More than half the days Not at all - Not at all   Feeling down, depressed, irritable, or hopeless Not at all Nearly every day Not at all Not at all Not at all - Several days   Total Score PHQ 2 0 6 0 2 0 - 1   Trouble falling or staying asleep, or sleeping too much - Nearly every day - Nearly every day - - -   Feeling tired or having little energy - Nearly every day - Nearly every day - - -   Poor appetite, weight loss, or overeating - Nearly every day - Nearly every day - - -   Feeling bad about yourself - or that you are a failure or have let yourself or your family down - Not at all - Not at all - - -   Trouble concentrating on things such as school, work, reading, or watching TV - Not at all - Several days - - -   Moving or speaking so slowly that other people could have noticed; or the opposite being so fidgety that others notice - Nearly every day - Not at all - - -   Thoughts of being better off dead, or hurting yourself in some way - Not at all - Not at all - - -   PHQ 9 Score - 18 - 12 - - -   How difficult have these problems made it for you to do your work, take care of your home and get along with others - Extremely difficult - Very difficult - - -       Learning Assessment:  Learning Assessment 1/29/2018 10/25/2017 4/1/2015 2/6/2015 1/27/2014   PRIMARY LEARNER Patient Patient Patient Patient Patient   HIGHEST LEVEL OF EDUCATION - PRIMARY LEARNER  DID NOT GRADUATE HIGH SCHOOL DID NOT GRADUATE HIGH SCHOOL - - -   BARRIERS PRIMARY LEARNER NONE NONE - - -   CO-LEARNER CAREGIVER No No - - -   PRIMARY LANGUAGE ENGLISH ENGLISH ENGLISH ENGLISH ENGLISH   LEARNER PREFERENCE PRIMARY DEMONSTRATION DEMONSTRATION LISTENING LISTENING DEMONSTRATION     - - - READING -   ANSWERED BY self self patient patient patient   RELATIONSHIP SELF SELF SELF SELF SELF       Abuse Screening:  Abuse Screening Questionnaire 6/13/2019 1/29/2018 7/24/2017 4/13/2015   Do you ever feel afraid of your partner? N N N N   Are you in a relationship with someone who physically or mentally threatens you? N N N N   Is it safe for you to go home? Idrisbhavna [place]       Fall Risk  Fall Risk Assessment, last 12 mths 4/4/2018 1/29/2018 7/24/2017 1/27/2014   Able to walk? Yes Yes Yes Yes   Fall in past 12 months? No No No No           Health Maintenance reviewed and discussed per provider. Yes    Jaiden Hernandez is due for   Health Maintenance Due   Topic Date Due    COLONOSCOPY  01/20/2017    EYE EXAM RETINAL OR DILATED  06/19/2017    BREAST CANCER SCRN MAMMOGRAM  01/17/2019    FOOT EXAM Q1  07/09/2019    MICROALBUMIN Q1  07/09/2019    Influenza Age 9 to Adult  08/01/2019    HEMOGLOBIN A1C Q6M  09/30/2019     Please order/place referral if appropriate. Advance Directive:  1. Do you have an advance directive in place? Patient Reply: no    2. If not, would you like material regarding how to put one in place? Patient Reply: no    Coordination of Care:  1. Have you been to the ER, urgent care clinic since your last visit? Hospitalized since your last visit? no    2. Have you seen or consulted any other health care providers outside of the [address] [place] [address] since your last visit? Include any pap smears or colon screening.  Ortho

## 2019-09-17 NOTE — PROGRESS NOTES
HISTORY OF PRESENT ILLNESS  Jaquelin Sutherland is a 62 y.o. female. Visit Vitals  /80 (BP 1 Location: Left arm, BP Patient Position: Sitting)   Pulse 97   Temp 99 °F (37.2 °C) (Oral)   Resp 16   Ht 5' 1\" (1.549 m)   Wt 122 lb (55.3 kg)   SpO2 98%   BMI 23.05 kg/m²           C/o diffuse widespread pain today. Current Outpatient Medications:  promethazine (PHENERGAN) 25 mg tablet, Take 1 Tab by mouth every six (6) hours as needed for Nausea. Indications: nausea  simvastatin (ZOCOR) 40 mg tablet, TAKE 1 TABLET BY MOUTH EVERY NIGHT AT BEDTIME  atenolol (TENORMIN) 25 mg tablet, Take 1 Tab by mouth daily. Indications: high blood pressure  topiramate (TOPAMAX) 50 mg tablet, Take 3 Tabs by mouth two (2) times a day. Indications: Migraine Prevention, seizure  simvastatin (ZOCOR) 40 mg tablet, TAKE 1 TABLET BY MOUTH EVERY NIGHT AT BEDTIME  Indications: high cholesterol  LORazepam (ATIVAN) 2 mg tablet, Take 1 Tab by mouth two (2) times daily as needed for Anxiety. Max Daily Amount: 4 mg. Indications: anxious  metFORMIN ER (GLUCOPHAGE XR) 750 mg tablet, Take 1 Tab by mouth daily. Indications: type 2 diabetes mellitus  DULoxetine (CYMBALTA) 60 mg capsule, Take 1 Cap by mouth daily. cyclobenzaprine (FLEXERIL) 10 mg tablet, Take 1 Tab by mouth two (2) times a day. methylphenidate HCl (RITALIN) 10 mg tablet, Take 1 Tab by mouth three (3) times daily as needed (ADJUNCT TO DEPRESSION). Max Daily Amount: 30 mg. Indications: ADJUNCT FOR FIBROMYALGIA  methylphenidate HCl (RITALIN) 10 mg tablet, Take 1 Tab by mouth three (3) times daily. Max Daily Amount: 30 mg. Indications: ADJUNCT TO FIBROMYALGIA  methylphenidate HCl (RITALIN) 10 mg tablet, Take 1 Tab by mouth three (3) times daily.  Indications: ADJUNCT FOR FIBROMYALGIA  cloNIDine HCl (CATAPRES) 0.1 mg tablet, TAKE 1 TABLET BY MOUTH TWICE DAILY AS NEEDED FOR OPIOD WITHDRAWAL SYMPTOMS  mirtazapine (REMERON SOL-TAB) 30 mg disintegrating tablet, 1/2 to 1 tab po qhs prn.  TENS Units (TENS 504) dean, Please provide patient with a TENS unit to help improve function, improve quality of life, reduce medication use, improve ROM. Multifactorial complex widespread chronic pain with contribution from lumbosacral spondylosis, bilateral greater trochanteric bursitis, bilateral piriformis syndrome, post  senna-docusate (PERICOLACE) 8.6-50 mg per tablet, 1-2 tabs daily as needed for constipation  NEXIUM 40 mg capsule, TK 1 C PO QD AT 3PM  OTHER, DDS Lumbar Traction Belt  Use as directed      Hypertension    The history is provided by the patient. This is a chronic problem. The current episode started more than 1 week ago. The problem has been gradually improving. Associated symptoms include chest pain, malaise/fatigue, headaches, neck pain and dizziness. Risk factors include obesity and postmenopause. Diabetes   The history is provided by the patient. This is a chronic problem. The current episode started more than 1 week ago. The problem occurs daily. The problem has not changed since onset. Associated symptoms include chest pain and headaches. Exacerbated by: diet. The symptoms are relieved by medications (diet). Treatments tried: see med list.   Cholesterol Problem   Associated symptoms include chest pain and headaches. Review of Systems   Constitutional: Positive for diaphoresis and malaise/fatigue. Cardiovascular: Positive for chest pain. Musculoskeletal: Positive for back pain, joint pain, myalgias and neck pain. Right arm/shoulder continues to be in severe pain and she has residual numbness in her right thumb   Neurological: Positive for dizziness, sensory change and headaches. Psychiatric/Behavioral: Positive for depression. The patient is nervous/anxious. Physical Exam   Constitutional: She is oriented to person, place, and time. She appears well-developed and well-nourished. No distress. Cardiovascular: Normal rate and regular rhythm. Pulmonary/Chest: Effort normal and breath sounds normal.   Musculoskeletal: She exhibits no edema. Neurological: She is alert and oriented to person, place, and time. Skin: Skin is warm and dry. She is not diaphoretic. Psychiatric:   As usual very tearful and anxious   Nursing note and vitals reviewed. ASSESSMENT and PLAN    ICD-10-CM ICD-9-CM    1. Type 2 diabetes mellitus without complication, without long-term current use of insulin ([facility]) U81.4 702.95 METABOLIC PANEL, COMPREHENSIVE      LIPID PANEL      HEMOGLOBIN A1C W/O EAG      MICROALBUMIN, UR, RAND W/ MICROALB/CREAT RATIO   2. Essential hypertension I10 401.9 LIPID PANEL   3. Dyslipidemia E78.5 272.4 LIPID PANEL   4. Fibromyalgia M79.7 729.1 methylphenidate HCl (RITALIN) 10 mg tablet      methylphenidate HCl (RITALIN) 10 mg tablet      methylphenidate HCl (RITALIN) 10 mg tablet   5. Medication refill Z76.0 V68.1 promethazine (PHENERGAN) 25 mg tablet      LORazepam (ATIVAN) 2 mg tablet      methylphenidate HCl (RITALIN) 10 mg tablet      methylphenidate HCl (RITALIN) 10 mg tablet      methylphenidate HCl (RITALIN) 10 mg tablet   6. Encounter for immunization Z23 V03.89 ADMIN INFLUENZA VIRUS VAC      INFLUENZA VACCINE INACTIVATED (IIV), SUBUNIT, ADJUVANTED, IM   7. Screen for colon cancer Z12.11 V76.51 OCCULT BLOOD IMMUNOASSAY,DIAGNOSTIC       Started crying and asking for pain meds today. I advised her that I would not write pain meds for her any longer. She had been seen by [facility] pain management and was not happy when they discontinued her opiates. She was also referred to [facility] in Nov 2018. They were not accepting new pts at that time and referred her elsewhere. So she has been off opiates since then. I think she needs to see psych and/or a pain psychologist, but she has refused to go on the past.  (I have also been advised by a caretaker who knows her that she makes no effort to help herself.  Sits or lays around all day)  There is nothing else to offer someone who cannot/will not follow through with medical advice and has unrealistic expectations    In March she was hospitalized at [facility] for right arm sxs that were diagnosed as brachial plexitis. She still says she had a stroke.   This takes time to heal    Update labs    Refill her ritalin which is being used to treat her fibromyalgia    F/u 3 months for next ritalin refills

## 2019-09-18 LAB
ALBUMIN SERPL-MCNC: 4.2 G/DL (ref 3.4–5)
ALBUMIN/GLOB SERPL: 1.6 {RATIO} (ref 0.8–1.7)
ALP SERPL-CCNC: 36 U/L (ref 45–117)
ALT SERPL-CCNC: 19 U/L (ref 13–56)
ANION GAP SERPL CALC-SCNC: 8 MMOL/L (ref 3–18)
AST SERPL-CCNC: 11 U/L (ref 10–38)
BILIRUB SERPL-MCNC: 0.4 MG/DL (ref 0.2–1)
BUN SERPL-MCNC: 16 MG/DL (ref 7–18)
BUN/CREAT SERPL: 14 (ref 12–20)
CALCIUM SERPL-MCNC: 8.9 MG/DL (ref 8.5–10.1)
CHLORIDE SERPL-SCNC: 112 MMOL/L (ref 100–111)
CO2 SERPL-SCNC: 22 MMOL/L (ref 21–32)
CREAT SERPL-MCNC: 1.15 MG/DL (ref 0.6–1.3)
GLOBULIN SER CALC-MCNC: 2.6 G/DL (ref 2–4)
GLUCOSE SERPL-MCNC: 86 MG/DL (ref 74–99)
HBA1C MFR BLD: 5.5 % (ref 4.2–5.6)
POTASSIUM SERPL-SCNC: 3.8 MMOL/L (ref 3.5–5.5)
PROT SERPL-MCNC: 6.8 G/DL (ref 6.4–8.2)
SODIUM SERPL-SCNC: 142 MMOL/L (ref 136–145)

## 2019-09-19 LAB
CHOLEST SERPL-MCNC: 104 MG/DL
CREAT UR-MCNC: 68 MG/DL (ref 30–125)
HDLC SERPL-MCNC: 43 MG/DL (ref 40–60)
HDLC SERPL: 2.4 {RATIO} (ref 0–5)
LDLC SERPL CALC-MCNC: 39.8 MG/DL (ref 0–100)
LIPID PROFILE,FLP: NORMAL
MICROALBUMIN UR-MCNC: 0.81 MG/DL (ref 0–3)
MICROALBUMIN/CREAT UR-RTO: 12 MG/G (ref 0–30)
TRIGL SERPL-MCNC: 106 MG/DL (ref ?–150)
VLDLC SERPL CALC-MCNC: 21.2 MG/DL

## 2019-09-27 ENCOUNTER — HOSPITAL ENCOUNTER (OUTPATIENT)
Dept: LAB | Age: 59
Discharge: HOME | End: 2019-09-27
Payer: MEDICARE

## 2019-09-27 DIAGNOSIS — Z12.11 SCREEN FOR COLON CANCER: ICD-10-CM

## 2019-09-27 PROCEDURE — 82274 ASSAY TEST FOR BLOOD FECAL: CPT

## 2019-10-03 LAB — HEMOCCULT STL QL IA: NEGATIVE

## 2019-12-05 DIAGNOSIS — M47.817 LUMBOSACRAL SPONDYLOSIS WITHOUT MYELOPATHY: ICD-10-CM

## 2019-12-05 DIAGNOSIS — Z76.0 MEDICATION REFILL: ICD-10-CM

## 2019-12-05 DIAGNOSIS — M79.7 FIBROMYALGIA: ICD-10-CM

## 2019-12-05 DIAGNOSIS — M46.1 SACROILIITIS, NOT ELSEWHERE CLASSIFIED (HCC): ICD-10-CM

## 2019-12-05 RX ORDER — TOPIRAMATE 50 MG/1
TABLET, FILM COATED ORAL
Qty: 540 TAB | Refills: 0 | Status: SHIPPED | OUTPATIENT
Start: 2019-12-05 | End: 2019-12-17 | Stop reason: SDUPTHER

## 2019-12-05 RX ORDER — LORAZEPAM 2 MG/1
TABLET ORAL
Qty: 60 TAB | Refills: 2 | Status: SHIPPED | OUTPATIENT
Start: 2019-12-05 | End: 2020-03-05

## 2019-12-17 ENCOUNTER — OFFICE VISIT (OUTPATIENT)
Dept: INTERNAL MEDICINE CLINIC | Age: 59
End: 2019-12-17

## 2019-12-17 VITALS
RESPIRATION RATE: 20 BRPM | WEIGHT: 121 LBS | BODY MASS INDEX: 22.84 KG/M2 | OXYGEN SATURATION: 99 % | SYSTOLIC BLOOD PRESSURE: 144 MMHG | TEMPERATURE: 98.1 F | HEART RATE: 83 BPM | HEIGHT: 61 IN | DIASTOLIC BLOOD PRESSURE: 85 MMHG

## 2019-12-17 DIAGNOSIS — M47.817 LUMBOSACRAL SPONDYLOSIS WITHOUT MYELOPATHY: ICD-10-CM

## 2019-12-17 DIAGNOSIS — M25.511 CHRONIC RIGHT SHOULDER PAIN: Primary | ICD-10-CM

## 2019-12-17 DIAGNOSIS — G89.29 CHRONIC RIGHT SHOULDER PAIN: Primary | ICD-10-CM

## 2019-12-17 DIAGNOSIS — M75.41 IMPINGEMENT SYNDROME OF RIGHT SHOULDER: ICD-10-CM

## 2019-12-17 DIAGNOSIS — Z76.0 MEDICATION REFILL: ICD-10-CM

## 2019-12-17 DIAGNOSIS — M79.7 FIBROMYALGIA: ICD-10-CM

## 2019-12-17 DIAGNOSIS — M46.1 SACROILIITIS, NOT ELSEWHERE CLASSIFIED (HCC): ICD-10-CM

## 2019-12-17 RX ORDER — TOPIRAMATE 200 MG/1
200 TABLET ORAL 2 TIMES DAILY
Qty: 180 TAB | Refills: 3 | Status: SHIPPED | OUTPATIENT
Start: 2019-12-17 | End: 2020-12-22

## 2019-12-17 RX ORDER — DICLOFENAC SODIUM 10 MG/G
2 GEL TOPICAL 4 TIMES DAILY
Qty: 100 G | Refills: 5 | Status: SHIPPED | OUTPATIENT
Start: 2019-12-17 | End: 2020-05-27

## 2019-12-17 RX ORDER — TOPIRAMATE 50 MG/1
TABLET, FILM COATED ORAL
Qty: 540 TAB | Refills: 0 | Status: CANCELLED | OUTPATIENT
Start: 2019-12-17

## 2019-12-17 RX ORDER — ATENOLOL 25 MG/1
25 TABLET ORAL DAILY
Qty: 90 TAB | Refills: 4 | Status: SHIPPED | OUTPATIENT
Start: 2019-12-17 | End: 2020-10-14 | Stop reason: SDUPTHER

## 2019-12-17 NOTE — PROGRESS NOTES
ROOM # 4    Vivi Meier presents today for   Chief Complaint   Patient presents with    Hypertension    Diabetes    Depression       Vivi Meier preferred language for health care discussion is english/other.     Is someone accompanying this pt? roommate    Is the patient using any DME equipment during [address]? no    Depression Screening:  3 most recent PHQ Screens 11/26/2018 10/29/2018 10/15/2018 10/4/2018 8/30/2018 8/2/2018 6/25/2018   PHQ Not Done - - - - - Active Diagnosis of Depression or Bipolar Disorder -   Little interest or pleasure in doing things Not at all Nearly every day Not at all More than half the days Not at all - Not at all   Feeling down, depressed, irritable, or hopeless Not at all Nearly every day Not at all Not at all Not at all - Several days   Total Score PHQ 2 0 6 0 2 0 - 1   Trouble falling or staying asleep, or sleeping too much - Nearly every day - Nearly every day - - -   Feeling tired or having little energy - Nearly every day - Nearly every day - - -   Poor appetite, weight loss, or overeating - Nearly every day - Nearly every day - - -   Feeling bad about yourself - or that you are a failure or have let yourself or your family down - Not at all - Not at all - - -   Trouble concentrating on things such as school, work, reading, or watching TV - Not at all - Several days - - -   Moving or speaking so slowly that other people could have noticed; or the opposite being so fidgety that others notice - Nearly every day - Not at all - - -   Thoughts of being better off dead, or hurting yourself in some way - Not at all - Not at all - - -   PHQ 9 Score - 18 - 12 - - -   How difficult have these problems made it for you to do your work, take care of your home and get along with others - Extremely difficult - Very difficult - - -       Learning Assessment:  Learning Assessment 1/29/2018 10/25/2017 4/1/2015 2/6/2015 1/27/2014   PRIMARY LEARNER Patient Patient Patient Patient Patient HIGHEST LEVEL OF EDUCATION - PRIMARY LEARNER  DID NOT GRADUATE HIGH SCHOOL DID NOT GRADUATE HIGH SCHOOL - - -   BARRIERS PRIMARY LEARNER NONE NONE - - -   CO-LEARNER CAREGIVER No No - - -   PRIMARY LANGUAGE ENGLISH ENGLISH ENGLISH ENGLISH ENGLISH   LEARNER PREFERENCE PRIMARY DEMONSTRATION DEMONSTRATION LISTENING LISTENING DEMONSTRATION     - - - READING -   ANSWERED BY self self patient patient patient   RELATIONSHIP SELF SELF SELF SELF SELF       Abuse Screening:  Abuse Screening Questionnaire 6/13/2019 1/29/2018 7/24/2017 4/13/2015   Do you ever feel afraid of your partner? N N N N   Are you in a relationship with someone who physically or mentally threatens you? N N N N   Is it safe for you to go home? Karla Everett       Fall Risk  Fall Risk Assessment, last 12 mths 4/4/2018 1/29/2018 7/24/2017 1/27/2014   Able to walk? Yes Yes Yes Yes   Fall in past 12 months? No No No No           Health Maintenance reviewed and discussed per provider. Yes    Bryce Brought is due for   Health Maintenance Due   Topic Date Due    COLONOSCOPY  01/20/2017    EYE EXAM RETINAL OR DILATED  06/19/2017    BREAST CANCER SCRN MAMMOGRAM  01/17/2019    FOOT EXAM Q1  07/09/2019     Please order/place referral if appropriate. Advance Directive:  1. Do you have an advance directive in place? Patient Reply: no    2. If not, would you like material regarding how to put one in place? Patient Reply: no    Coordination of Care:  1. Have you been to the ER, urgent care clinic since your last visit? Hospitalized since your last visit? no    2. Have you seen or consulted any other health care providers outside of the [address] [place] [address] since your last visit? Include any pap smears or colon screening.  no

## 2019-12-17 NOTE — PROGRESS NOTES
HISTORY OF PRESENT ILLNESS  Aubree Rosa is a 62 y.o. female. Visit Vitals  /85 (BP 1 Location: Left arm, BP Patient Position: Sitting)   Pulse 83   Temp 98.1 °F (36.7 °C) (Oral)   Resp 20   Ht 5' 1\" (1.549 m)   Wt 121 lb (54.9 kg)   SpO2 99%   BMI 22.86 kg/m²       She again complained of extreme pain. So much that she can not exert any effort to help herself  She had been seen by pain management who took her off her opiates. I have refused to restart them    Hypertension    The history is provided by the patient. This is a chronic problem. The current episode started more than 1 week ago. The problem has not changed since onset. There are no associated agents to hypertension. Risk factors include obesity, hypertension, dyslipidemia and diabetes mellitus. Shoulder Pain    The history is provided by the patient (chronic but worsening right shoulder pain and stiffness. . She saw ortho this summer and was injected. She declined PT at that time. ). The right shoulder is affected. Review of Systems   Constitutional: Negative. Musculoskeletal: Joint pain: shoulder pain. Psychiatric/Behavioral: Positive for depression. The patient is nervous/anxious. Physical Exam  Vitals signs and nursing note reviewed. Constitutional:       General: She is not in acute distress. Appearance: She is well-developed. She is not diaphoretic. Cardiovascular:      Rate and Rhythm: Normal rate and regular rhythm. Pulmonary:      Effort: Pulmonary effort is normal.      Breath sounds: Normal breath sounds. Skin:     General: Skin is warm and dry. Neurological:      Mental Status: She is alert and oriented to person, place, and time. Psychiatric:         Mood and Affect: Mood is depressed. Speech: Speech is delayed. Behavior: Behavior is slowed. Thought Content:  Thought content normal.      Comments: Baseline depressed and \"helpless\" appearance          ASSESSMENT and PLAN ICD-10-CM ICD-9-CM    1. Chronic right shoulder pain M25.511 719.41 REFERRAL TO PHYSICAL THERAPY    G89.29 338.29 diclofenac (VOLTAREN) 1 % gel   2. Lumbosacral spondylosis without myelopathy M47.817 721.3 topiramate (TOPAMAX) 200 mg tablet   3. Sacroiliitis, not elsewhere classified (HCC) M46.1 720.2 topiramate (TOPAMAX) 200 mg tablet   4. Fibromyalgia M79.7 729.1 topiramate (TOPAMAX) 200 mg tablet   5. Impingement syndrome of right shoulder M75.41 726.2 REFERRAL TO PHYSICAL THERAPY   6. Medication refill Z76.0 V68.1 atenolol (TENORMIN) 25 mg tablet      topiramate (TOPAMAX) 200 mg tablet       Will add topical voltaren gel to shoulder. Continue prn NSAIDs. (she did not directly ask for opiates today)    Refer to PT for the shoulder. She does not want to go due to it \"hurts too much. \"  should consider returning to Dr. Andres Martinez, ortho, for further evaluation. She may need another injection    BP up some. ? Pain    Increase topamax--it was denied due to quantity limit.  Higher dose may help her headaches    Not due for lab today    F/u 3 months for HTN, DM, fibromyalgia

## 2020-02-17 ENCOUNTER — PATIENT OUTREACH (OUTPATIENT)
Dept: INTERNAL MEDICINE CLINIC | Age: 60
End: 2020-02-17

## 2020-02-17 RX ORDER — METHYLPHENIDATE HYDROCHLORIDE 10 MG/1
10 TABLET ORAL 3 TIMES DAILY
COMMUNITY
End: 2020-05-07 | Stop reason: SDUPTHER

## 2020-02-17 NOTE — PROGRESS NOTES
Complex Case Management      Date/Time:  2020 12:27 PM    Method of communication with patient:phone    [facility] ([facility]) contacted the patient by telephone to perform Ambulatory Care Coordination. Verified name and  (PHI) with patient as identifiers. Provided introduction to self, and explanation of the Ambulatory Care Manager's role. Reviewed most recent clinic visit w/ patient who verbalized understanding. Patient given an opportunity to ask questions. Top Challenges reviewed with the patient   1. Fibromyalgia - pain level 8/10 today. States she will use P.T. that is in the \"place\" she lives. 2. Arm pain - states she is using arm everyday with some limitations due to past stroke     The patient agrees to contact the PCP office or the [facility] for questions related to their healthcare. Provided contact information for future reference. Disease Specific:   N/A    Home Health Active: No    DME Active: No    Barriers to care? None noted at present. Patient's roommate assists as needed    Advance Care Planning:   Does patient have an Advance Directive:  not on file     Medication(s):   Medication reconciliation was performed with patient, who verbalizes understanding of administration of home medications. There were no barriers to obtaining medications identified at this time. Referral to Pharm D needed: no     Current Outpatient Medications   Medication Sig    methylphenidate HCl (RITALIN) 10 mg tablet Take 10 mg by mouth three (3) times daily. ADJUNCT FOR DEPRESSION/FIBROMYALGIA    menthol (MINERAL ICE EX) by Apply Externally route two (2) times daily as needed. Patient states she alternates with Volteren gel    atenolol (TENORMIN) 25 mg tablet Take 1 Tab by mouth daily. Indications: high blood pressure    topiramate (TOPAMAX) 200 mg tablet Take 1 Tab by mouth two (2) times a day.  diclofenac (VOLTAREN) 1 % gel Apply 2 g to affected area four (4) times daily.  LORazepam (ATIVAN) 2 mg tablet TAKE 1 TABLET BY MOUTH TWICE DAILY    promethazine (PHENERGAN) 25 mg tablet Take 1 Tab by mouth every six (6) hours as needed for Nausea. Indications: nausea    simvastatin (ZOCOR) 40 mg tablet TAKE 1 TABLET BY MOUTH EVERY NIGHT AT BEDTIME  Indications: high cholesterol    metFORMIN ER (GLUCOPHAGE XR) 750 mg tablet Take 1 Tab by mouth daily. Indications: type 2 diabetes mellitus    DULoxetine (CYMBALTA) 60 mg capsule Take 1 Cap by mouth daily.  cyclobenzaprine (FLEXERIL) 10 mg tablet Take 1 Tab by mouth two (2) times a day.  NEXIUM 40 mg capsule TK 1 C PO QD AT 3PM    cloNIDine HCl (CATAPRES) 0.1 mg tablet TAKE 1 TABLET BY MOUTH TWICE DAILY AS NEEDED FOR OPIOD WITHDRAWAL SYMPTOMS    mirtazapine (REMERON SOL-TAB) 30 mg disintegrating tablet 1/2 to 1 tab po qhs prn.  TENS Units (TENS 504) dean Please provide patient with a TENS unit to help improve function, improve quality of life, reduce medication use, improve ROM. Multifactorial complex widespread chronic pain with contribution from lumbosacral spondylosis, bilateral greater trochanteric bursitis, bilateral piriformis syndrome, post (Patient taking differently: 2.17.20 Patient states she has not ever received TENS unit  Please provide patient with a TENS unit to help improve function, improve quality of life, reduce medication use, improve ROM. Multifactorial complex widespread chronic pain with contribution from lumbosacral spondylosis, bilateral greater trochanteric bursitis, bilateral piriformis syndrome, post)    senna-docusate (PERICOLACE) 8.6-50 mg per tablet 1-2 tabs daily as needed for constipation    OTHER DDS Lumbar Traction Belt  Use as directed     No current facility-administered medications for this visit.         [facility] follow up appointment(s):   Future Appointments   Date Time Provider Andrea Corona   4/6/2020  1:30 PM MD HARVINDER Pope        Non-[facility] follow up appointment(s): Pain Management - Dr. Sheldon Yapr on 3/16/20    Goals Addressed                 This Visit's Progress     Demonstrate self care to prevent worsening of fibromyalgia        Patient will continue low impact, low stress exercises  Patient will eat well balanced, healthy diet         Reduced concentrated sweets         Eat more plants than meat         Drink plenty of water

## 2020-02-28 ENCOUNTER — PATIENT OUTREACH (OUTPATIENT)
Dept: CASE MANAGEMENT | Age: 60
End: 2020-02-28

## 2020-04-28 DIAGNOSIS — Z76.0 MEDICATION REFILL: ICD-10-CM

## 2020-04-28 NOTE — TELEPHONE ENCOUNTER
Patient request for a 90 day refill. Last OV 12/17/19, next scheduled 5/27/20. Requested Prescriptions     Pending Prescriptions Disp Refills    simvastatin (ZOCOR) 40 mg tablet 90 Tab 4     Sig: TAKE 1 TABLET BY MOUTH EVERY NIGHT AT BEDTIME  Indications: high cholesterol    methylphenidate HCl (Ritalin) 10 mg tablet       Sig: Take 1 Tab by mouth three (3) times daily.  Max Daily Amount: 30 mg. ADJUNCT FOR DEPRESSION/FIBROMYALGIA

## 2020-04-29 RX ORDER — SIMVASTATIN 40 MG/1
TABLET, FILM COATED ORAL
Qty: 90 TAB | Refills: 4 | Status: SHIPPED | OUTPATIENT
Start: 2020-04-29 | End: 2021-01-26 | Stop reason: SDUPTHER

## 2020-04-29 RX ORDER — METHYLPHENIDATE HYDROCHLORIDE 10 MG/1
10 TABLET ORAL 3 TIMES DAILY
Qty: 30 TAB | Refills: 0 | OUTPATIENT
Start: 2020-04-29

## 2020-05-07 DIAGNOSIS — M79.7 FIBROMYALGIA: Primary | ICD-10-CM

## 2020-05-07 DIAGNOSIS — F32.A DEPRESSION, UNSPECIFIED DEPRESSION TYPE: ICD-10-CM

## 2020-05-07 DIAGNOSIS — Z76.0 MEDICATION REFILL: ICD-10-CM

## 2020-05-07 RX ORDER — METHYLPHENIDATE HYDROCHLORIDE 10 MG/1
10 TABLET ORAL 3 TIMES DAILY
Qty: 90 TAB | Refills: 0 | Status: SHIPPED | OUTPATIENT
Start: 2020-05-07 | End: 2020-10-14 | Stop reason: SDUPTHER

## 2020-05-07 RX ORDER — METHYLPHENIDATE HYDROCHLORIDE 10 MG/1
10 TABLET ORAL 3 TIMES DAILY
Status: CANCELLED | OUTPATIENT
Start: 2020-05-07

## 2020-05-07 RX ORDER — METHYLPHENIDATE HYDROCHLORIDE 10 MG/1
10 TABLET ORAL 3 TIMES DAILY
Qty: 90 TAB | Refills: 0 | Status: SHIPPED | OUTPATIENT
Start: 2020-06-06 | End: 2020-10-14 | Stop reason: SDUPTHER

## 2020-05-07 RX ORDER — METHYLPHENIDATE HYDROCHLORIDE 10 MG/1
10 TABLET ORAL 3 TIMES DAILY
Qty: 90 TAB | Refills: 0 | Status: SHIPPED | OUTPATIENT
Start: 2020-05-07 | End: 2020-05-07 | Stop reason: SDUPTHER

## 2020-05-27 ENCOUNTER — HOSPITAL ENCOUNTER (OUTPATIENT)
Dept: LAB | Age: 60
Discharge: HOME | End: 2020-05-27
Payer: MEDICARE

## 2020-05-27 ENCOUNTER — OFFICE VISIT (OUTPATIENT)
Dept: INTERNAL MEDICINE CLINIC | Age: 60
End: 2020-05-27

## 2020-05-27 VITALS — SYSTOLIC BLOOD PRESSURE: 123 MMHG | DIASTOLIC BLOOD PRESSURE: 83 MMHG | HEART RATE: 104 BPM

## 2020-05-27 DIAGNOSIS — M79.7 FIBROMYALGIA: ICD-10-CM

## 2020-05-27 DIAGNOSIS — Z76.0 MEDICATION REFILL: ICD-10-CM

## 2020-05-27 DIAGNOSIS — M25.511 CHRONIC RIGHT SHOULDER PAIN: ICD-10-CM

## 2020-05-27 DIAGNOSIS — E11.9 TYPE 2 DIABETES MELLITUS WITHOUT COMPLICATION, WITHOUT LONG-TERM CURRENT USE OF INSULIN (HCC): Chronic | ICD-10-CM

## 2020-05-27 DIAGNOSIS — E11.9 TYPE 2 DIABETES MELLITUS WITHOUT COMPLICATION, WITHOUT LONG-TERM CURRENT USE OF INSULIN (HCC): Primary | Chronic | ICD-10-CM

## 2020-05-27 DIAGNOSIS — E78.5 DYSLIPIDEMIA: ICD-10-CM

## 2020-05-27 DIAGNOSIS — G89.29 CHRONIC RIGHT SHOULDER PAIN: ICD-10-CM

## 2020-05-27 LAB
ALBUMIN SERPL-MCNC: 3.8 G/DL (ref 3.4–5)
ALBUMIN/GLOB SERPL: 1.3 {RATIO} (ref 0.8–1.7)
ALP SERPL-CCNC: 48 U/L (ref 45–117)
ALT SERPL-CCNC: 23 U/L (ref 13–56)
ANION GAP SERPL CALC-SCNC: 5 MMOL/L (ref 3–18)
AST SERPL-CCNC: 10 U/L (ref 10–38)
BILIRUB SERPL-MCNC: 0.7 MG/DL (ref 0.2–1)
BUN SERPL-MCNC: 18 MG/DL (ref 7–18)
BUN/CREAT SERPL: 15 (ref 12–20)
CALCIUM SERPL-MCNC: 8.5 MG/DL (ref 8.5–10.1)
CHLORIDE SERPL-SCNC: 113 MMOL/L (ref 100–111)
CHOLEST SERPL-MCNC: 127 MG/DL
CO2 SERPL-SCNC: 24 MMOL/L (ref 21–32)
CREAT SERPL-MCNC: 1.2 MG/DL (ref 0.6–1.3)
GLOBULIN SER CALC-MCNC: 3 G/DL (ref 2–4)
GLUCOSE SERPL-MCNC: 93 MG/DL (ref 74–99)
HBA1C MFR BLD: 5.9 % (ref 4.2–5.6)
HDLC SERPL-MCNC: 61 MG/DL (ref 40–60)
HDLC SERPL: 2.1 {RATIO} (ref 0–5)
LDLC SERPL CALC-MCNC: 54.8 MG/DL (ref 0–100)
LIPID PROFILE,FLP: ABNORMAL
POTASSIUM SERPL-SCNC: 4 MMOL/L (ref 3.5–5.5)
PROT SERPL-MCNC: 6.8 G/DL (ref 6.4–8.2)
SODIUM SERPL-SCNC: 142 MMOL/L (ref 136–145)
TRIGL SERPL-MCNC: 56 MG/DL (ref ?–150)
VLDLC SERPL CALC-MCNC: 11.2 MG/DL

## 2020-05-27 PROCEDURE — 36415 COLL VENOUS BLD VENIPUNCTURE: CPT

## 2020-05-27 PROCEDURE — 80061 LIPID PANEL: CPT

## 2020-05-27 PROCEDURE — 83036 HEMOGLOBIN GLYCOSYLATED A1C: CPT

## 2020-05-27 PROCEDURE — 80053 COMPREHEN METABOLIC PANEL: CPT

## 2020-05-27 RX ORDER — LIDOCAINE 50 MG/G
PATCH TOPICAL EVERY 24 HOURS
COMMUNITY

## 2020-05-27 RX ORDER — HYDROCODONE BITARTRATE AND ACETAMINOPHEN 5; 325 MG/1; MG/1
TABLET ORAL
COMMUNITY
End: 2021-04-26

## 2020-05-27 RX ORDER — LORAZEPAM 2 MG/1
TABLET ORAL
Qty: 60 TAB | Refills: 5 | Status: SHIPPED | OUTPATIENT
Start: 2020-05-27 | End: 2020-10-14 | Stop reason: SDUPTHER

## 2020-05-27 NOTE — PROGRESS NOTES
Isai Monroe is a 56 y.o. female (: 1960) presenting to address:    Chief Complaint   Patient presents with    Hypertension    Diabetes       Vitals:    20 1523   BP: 123/83   Pulse: (!) 104   Resp: (P) 18   Temp: (P) 98.3 °F (36.8 °C)   TempSrc: (P) Skin   SpO2: (P) 96%   Weight: (P) 140 lb 3.2 oz (63.6 kg)   Height: (P) 5' 1\" (1.549 m)   PainSc:   6   PainLoc: Shoulder       Hearing/Vision:   No exam data present    Learning Assessment:     Learning Assessment 2018   PRIMARY LEARNER Patient   HIGHEST LEVEL OF EDUCATION - PRIMARY LEARNER  DID NOT GRADUATE HIGH SCHOOL   BARRIERS PRIMARY LEARNER NONE   CO-LEARNER CAREGIVER No   PRIMARY LANGUAGE ENGLISH   LEARNER PREFERENCE PRIMARY DEMONSTRATION     -   ANSWERED BY self   RELATIONSHIP SELF     Depression Screening:     3 most recent PHQ Screens 2020   PHQ Not Done -   Little interest or pleasure in doing things Not at all   Feeling down, depressed, irritable, or hopeless Not at all   Total Score PHQ 2 0   Trouble falling or staying asleep, or sleeping too much -   Feeling tired or having little energy -   Poor appetite, weight loss, or overeating -   Feeling bad about yourself - or that you are a failure or have let yourself or your family down -   Trouble concentrating on things such as school, work, reading, or watching TV -   Moving or speaking so slowly that other people could have noticed; or the opposite being so fidgety that others notice -   Thoughts of being better off dead, or hurting yourself in some way -   PHQ 9 Score -   How difficult have these problems made it for you to do your work, take care of your home and get along with others -     Fall Risk Assessment:     Fall Risk Assessment, last 12 mths 2018   Able to walk? Yes   Fall in past 12 months? No     Abuse Screening:     Abuse Screening Questionnaire 2019   Do you ever feel afraid of your partner?  N   Are you in a relationship with someone who physically or mentally threatens you? N   Is it safe for you to go home? Y     Coordination of Care Questionaire:   1. Have you been to the ER, urgent care clinic since your last visit? Hospitalized since your last visit? NO    2. Have you seen or consulted any other health care providers outside of the [address] [place] [address] since your last visit? Include any pap smears or colon screening. YES Dr. Renne Opitz    Advanced Directive:   1. Do you have an Advanced Directive? NO    2. Would you like information on Advanced Directives?  NO

## 2020-05-27 NOTE — PROGRESS NOTES
HISTORY OF PRESENT ILLNESS  Sharri Carpio is a 56 y.o. female. Visit Vitals  /83 (BP 1 Location: Right arm, BP Patient Position: Sitting)   Pulse (!) 104   Temp (P) 98.3 °F (36.8 °C) (Skin)   Resp (P) 18   Ht (P) 5' 1\" (1.549 m)   Wt (P) 140 lb 3.2 oz (63.6 kg)   SpO2 (P) 96%   BMI (P) 26.49 kg/m²       Hypertension    The history is provided by the patient. This is a chronic problem. The current episode started more than 1 week ago. The problem has not changed since onset. Pertinent negatives include no chest pain and no shortness of breath. Risk factors include hypertension, stress and diabetes mellitus. Diabetes   The history is provided by the patient (checks blood sugar daily). This is a chronic problem. The current episode started more than 1 week ago. The problem occurs daily. The problem has not changed since onset. Pertinent negatives include no chest pain, no abdominal pain and no shortness of breath. Exacerbated by: diet. The symptoms are relieved by medications (diet). Treatments tried: see med list.       Review of Systems   Constitutional: Negative for fever. Respiratory: Negative for cough and shortness of breath. Cardiovascular: Negative for chest pain. Gastrointestinal: Negative for abdominal pain. Genitourinary: Negative for frequency and urgency. Endo/Heme/Allergies: Negative for polydipsia. Physical Exam  Vitals signs and nursing note reviewed. Constitutional:       Appearance: She is well-developed. Cardiovascular:      Rate and Rhythm: Normal rate and regular rhythm. Pulmonary:      Effort: Pulmonary effort is normal.      Breath sounds: Normal breath sounds. Musculoskeletal:         General: Injury: several small scratches on feet. Skin:     General: Skin is warm and dry. Neurological:      General: No focal deficit present. Mental Status: She is alert and oriented to person, place, and time.       Comments: Diabetic foot exam:     Left Foot:   Visual Exam: callous - under great toe   Pulse DP: 2+ (normal)   Filament test: normal sensation    Vibratory sensation: normal      Right Foot:   Visual Exam: callous - and small bunion   Pulse DP: 2+ (normal)   Filament test: normal sensation    Vibratory sensation: normal     Psychiatric:         Mood and Affect: Mood normal.         ASSESSMENT and PLAN    ICD-10-CM ICD-9-CM    1. Type 2 diabetes mellitus without complication, without long-term current use of insulin ([facility]) E11.9 250.00 HM DIABETES FOOT EXAM      METABOLIC PANEL, COMPREHENSIVE      LIPID PANEL      HEMOGLOBIN A1C W/O EAG   2. Dyslipidemia E78.5 272.4 LIPID PANEL   3. Fibromyalgia M79.7 729.1    4. Chronic right shoulder pain M25.511 719.41 REFERRAL TO NEUROLOGY    G89.29 338.29    5. Medication refill Z76.0 V68.1 LORazepam (ATIVAN) 2 mg tablet         Update lab today    She should f/u with the ortho doctor about her shoulder. Will request neurology eval with possible EMG since c-spine MRI did not reveal anything significant enough to cause her current sxs. F/u 3-4 months for recheck shoulder pain. Fibromyalgia.  DM, chol

## 2020-07-01 DIAGNOSIS — M46.1 SACROILIITIS, NOT ELSEWHERE CLASSIFIED (HCC): ICD-10-CM

## 2020-07-01 DIAGNOSIS — M47.817 LUMBOSACRAL SPONDYLOSIS WITHOUT MYELOPATHY: ICD-10-CM

## 2020-07-01 DIAGNOSIS — Z76.0 MEDICATION REFILL: ICD-10-CM

## 2020-07-01 DIAGNOSIS — M79.7 FIBROMYALGIA: ICD-10-CM

## 2020-07-01 RX ORDER — DULOXETIN HYDROCHLORIDE 60 MG/1
CAPSULE, DELAYED RELEASE ORAL
Qty: 90 CAP | Refills: 4 | Status: SHIPPED | OUTPATIENT
Start: 2020-07-01 | End: 2021-01-26 | Stop reason: SDUPTHER

## 2020-07-05 RX ORDER — CYCLOBENZAPRINE HCL 10 MG
TABLET ORAL
Qty: 180 TAB | Refills: 5 | Status: SHIPPED | OUTPATIENT
Start: 2020-07-05 | End: 2020-10-14 | Stop reason: SDUPTHER

## 2020-09-13 DIAGNOSIS — Z76.0 MEDICATION REFILL: ICD-10-CM

## 2020-09-13 RX ORDER — METFORMIN HYDROCHLORIDE 750 MG/1
TABLET, EXTENDED RELEASE ORAL
Qty: 90 TAB | Refills: 4 | Status: SHIPPED | OUTPATIENT
Start: 2020-09-13 | End: 2021-01-26 | Stop reason: SDUPTHER

## 2020-10-14 ENCOUNTER — OFFICE VISIT (OUTPATIENT)
Dept: INTERNAL MEDICINE CLINIC | Age: 60
End: 2020-10-14
Payer: MEDICARE

## 2020-10-14 VITALS
BODY MASS INDEX: 27.94 KG/M2 | HEART RATE: 98 BPM | TEMPERATURE: 98.1 F | HEIGHT: 61 IN | WEIGHT: 148 LBS | RESPIRATION RATE: 18 BRPM | DIASTOLIC BLOOD PRESSURE: 76 MMHG | OXYGEN SATURATION: 96 % | SYSTOLIC BLOOD PRESSURE: 129 MMHG

## 2020-10-14 DIAGNOSIS — Z00.00 MEDICARE ANNUAL WELLNESS VISIT, SUBSEQUENT: Primary | ICD-10-CM

## 2020-10-14 DIAGNOSIS — M47.817 LUMBOSACRAL SPONDYLOSIS WITHOUT MYELOPATHY: ICD-10-CM

## 2020-10-14 DIAGNOSIS — Z76.0 MEDICATION REFILL: ICD-10-CM

## 2020-10-14 DIAGNOSIS — F32.A DEPRESSION, UNSPECIFIED DEPRESSION TYPE: ICD-10-CM

## 2020-10-14 DIAGNOSIS — I10 ESSENTIAL HYPERTENSION: Chronic | ICD-10-CM

## 2020-10-14 DIAGNOSIS — M46.1 SACROILIITIS, NOT ELSEWHERE CLASSIFIED (HCC): ICD-10-CM

## 2020-10-14 DIAGNOSIS — E78.5 DYSLIPIDEMIA: Chronic | ICD-10-CM

## 2020-10-14 DIAGNOSIS — E11.21 TYPE 2 DIABETES WITH NEPHROPATHY (HCC): ICD-10-CM

## 2020-10-14 DIAGNOSIS — Z23 NEEDS FLU SHOT: ICD-10-CM

## 2020-10-14 DIAGNOSIS — Z12.11 SCREEN FOR COLON CANCER: ICD-10-CM

## 2020-10-14 DIAGNOSIS — M79.7 FIBROMYALGIA: ICD-10-CM

## 2020-10-14 PROCEDURE — G0008 ADMIN INFLUENZA VIRUS VAC: HCPCS | Performed by: INTERNAL MEDICINE

## 2020-10-14 PROCEDURE — 3044F HG A1C LEVEL LT 7.0%: CPT | Performed by: INTERNAL MEDICINE

## 2020-10-14 PROCEDURE — G0439 PPPS, SUBSEQ VISIT: HCPCS | Performed by: INTERNAL MEDICINE

## 2020-10-14 PROCEDURE — G8432 DEP SCR NOT DOC, RNG: HCPCS | Performed by: INTERNAL MEDICINE

## 2020-10-14 PROCEDURE — G8419 CALC BMI OUT NRM PARAM NOF/U: HCPCS | Performed by: INTERNAL MEDICINE

## 2020-10-14 PROCEDURE — G8752 SYS BP LESS 140: HCPCS | Performed by: INTERNAL MEDICINE

## 2020-10-14 PROCEDURE — 2022F DILAT RTA XM EVC RTNOPTHY: CPT | Performed by: INTERNAL MEDICINE

## 2020-10-14 PROCEDURE — G8427 DOCREV CUR MEDS BY ELIG CLIN: HCPCS | Performed by: INTERNAL MEDICINE

## 2020-10-14 PROCEDURE — 90686 IIV4 VACC NO PRSV 0.5 ML IM: CPT | Performed by: INTERNAL MEDICINE

## 2020-10-14 PROCEDURE — G8754 DIAS BP LESS 90: HCPCS | Performed by: INTERNAL MEDICINE

## 2020-10-14 PROCEDURE — 99214 OFFICE O/P EST MOD 30 MIN: CPT | Performed by: INTERNAL MEDICINE

## 2020-10-14 PROCEDURE — 3017F COLORECTAL CA SCREEN DOC REV: CPT | Performed by: INTERNAL MEDICINE

## 2020-10-14 PROCEDURE — G9899 SCRN MAM PERF RSLTS DOC: HCPCS | Performed by: INTERNAL MEDICINE

## 2020-10-14 RX ORDER — PROMETHAZINE HYDROCHLORIDE 25 MG/1
25 TABLET ORAL
Qty: 60 TAB | Refills: 5 | Status: SHIPPED | OUTPATIENT
Start: 2020-10-14 | End: 2022-01-19 | Stop reason: SDUPTHER

## 2020-10-14 RX ORDER — ATENOLOL 25 MG/1
25 TABLET ORAL DAILY
Qty: 90 TAB | Refills: 4 | Status: SHIPPED | OUTPATIENT
Start: 2020-10-14 | End: 2020-12-20

## 2020-10-14 RX ORDER — METHYLPHENIDATE HYDROCHLORIDE 10 MG/1
10 TABLET ORAL 3 TIMES DAILY
Qty: 90 TAB | Refills: 0 | Status: SHIPPED | OUTPATIENT
Start: 2020-10-14 | End: 2021-01-26 | Stop reason: SDUPTHER

## 2020-10-14 RX ORDER — DULOXETIN HYDROCHLORIDE 60 MG/1
CAPSULE, DELAYED RELEASE ORAL
Qty: 90 CAP | Refills: 4 | Status: CANCELLED | OUTPATIENT
Start: 2020-10-14

## 2020-10-14 RX ORDER — LORAZEPAM 2 MG/1
TABLET ORAL
Qty: 60 TAB | Refills: 5 | Status: SHIPPED | OUTPATIENT
Start: 2020-10-14 | End: 2020-11-27

## 2020-10-14 RX ORDER — CYCLOBENZAPRINE HCL 10 MG
TABLET ORAL
Qty: 180 TAB | Refills: 5 | Status: SHIPPED | OUTPATIENT
Start: 2020-10-14 | End: 2021-01-26 | Stop reason: SDUPTHER

## 2020-10-14 RX ORDER — ZONISAMIDE 100 MG/1
CAPSULE ORAL
COMMUNITY
Start: 2020-09-29 | End: 2021-04-26

## 2020-10-14 RX ORDER — GABAPENTIN 300 MG/1
300 CAPSULE ORAL 3 TIMES DAILY
Qty: 270 CAP | Refills: 1 | Status: SHIPPED | OUTPATIENT
Start: 2020-10-14 | End: 2021-01-26 | Stop reason: SDUPTHER

## 2020-10-14 NOTE — PROGRESS NOTES
The following is a separate encounter visit:  Lianet Jones is a 56 y.o. female. Visit Vitals  /76 (BP 1 Location: Right arm, BP Patient Position: Sitting)   Pulse 98   Temp 98.1 °F (36.7 °C) (Oral)   Resp 18   Ht 5' 1\" (1.549 m)   Wt 148 lb (67.1 kg)   SpO2 96%   BMI 27.96 kg/m²                 Current Outpatient Medications:  cyclobenzaprine (FLEXERIL) 10 mg tablet, TAKE 1 TABLET BY MOUTH TWICE DAILY  Indications: disorder characterized by stiff, tender & painful muscles  promethazine (PHENERGAN) 25 mg tablet, Take 1 Tab by mouth every six (6) hours as needed for Nausea. Indications: nausea  LORazepam (ATIVAN) 2 mg tablet, TAKE 1 TABLET BY MOUTH TWICE DAILY  atenoloL (Tenormin) 25 mg tablet, Take 1 Tab by mouth daily. Indications: high blood pressure  methylphenidate HCl (Ritalin) 10 mg tablet, Take 1 Tab by mouth three (3) times daily. Max Daily Amount: 30 mg. ADJUNCT FOR DEPRESSION/FIBROMYALGIA  metFORMIN ER (GLUCOPHAGE XR) 750 mg tablet, TAKE 1 TABLET BY MOUTH DAILY FOR DIABETES  DULoxetine (CYMBALTA) 60 mg capsule, TAKE ONE CAPSULE BY MOUTH EVERY DAY  lidocaine (LIDODERM) 5 %, by TransDERmal route every twenty-four (24) hours. Apply patch to the affected area for 12 hours a day and remove for 12 hours a day. HYDROcodone-acetaminophen (NORCO) 5-325 mg per tablet, Take  by mouth. simvastatin (ZOCOR) 40 mg tablet, TAKE 1 TABLET BY MOUTH EVERY NIGHT AT BEDTIME  Indications: high cholesterol  menthol (MINERAL ICE EX), by Apply Externally route two (2) times daily as needed. Patient states she alternates with Volteren gel  topiramate (TOPAMAX) 200 mg tablet, Take 1 Tab by mouth two (2) times a day. TENS Units (TENS 504) dean, Please provide patient with a TENS unit to help improve function, improve quality of life, reduce medication use, improve ROM.    Multifactorial complex widespread chronic pain with contribution from lumbosacral spondylosis, bilateral greater trochanteric bursitis, bilateral piriformis syndrome, post (Patient taking differently: 2.17.20 Patient states she has not ever received TENS unit  Please provide patient with a TENS unit to help improve function, improve quality of life, reduce medication use, improve ROM. Multifactorial complex widespread chronic pain with contribution from lumbosacral spondylosis, bilateral greater trochanteric bursitis, bilateral piriformis syndrome, post)  senna-docusate (PERICOLACE) 8.6-50 mg per tablet, 1-2 tabs daily as needed for constipation  NEXIUM 40 mg capsule, TK 1 C PO QD AT 3PM  OTHER, DDS Lumbar Traction Belt  Use as directed  zonisamide (ZONEGRAN) 100 mg capsule, TK 1 C PO 1 TIME ATN              Sees pain management. They give her norco TID. Neurology is referring her to rheumatology to evaluate for systemic lupus          Diabetes   The history is provided by the patient. This is a chronic problem. The current episode started more than 1 week ago. The problem occurs daily. The problem has not changed since onset. Pertinent negatives include no chest pain, no headaches and no shortness of breath. Exacerbated by: diet. The symptoms are relieved by medications (diet). Treatments tried: see med list. The treatment provided moderate relief. Hypertension    The history is provided by the patient. This is a chronic problem. The current episode started more than 1 week ago. The problem has not changed since onset. Associated symptoms include neck pain. Pertinent negatives include no chest pain, no palpitations, no headaches, no dizziness and no shortness of breath. Risk factors include obesity, a sedentary lifestyle and diabetes mellitus. Myalgia   The history is provided by the patient (fibromyalgia). This is a chronic problem. The current episode started more than 1 week ago. The problem occurs daily. The problem has been gradually worsening.  Pertinent negatives include no chest pain, no headaches and no shortness of breath. Review of Systems   Constitutional: Negative for chills and fever. HENT: Negative for congestion and sore throat. Respiratory: Negative for cough and shortness of breath. Cardiovascular: Negative for chest pain and palpitations. Musculoskeletal: Positive for back pain, joint pain, myalgias and neck pain. Neurological: Negative for dizziness and headaches. Physical Exam  Vitals signs and nursing note reviewed. Constitutional:       Appearance: Normal appearance. She is well-developed. Cardiovascular:      Rate and Rhythm: Normal rate and regular rhythm. Pulmonary:      Effort: Pulmonary effort is normal.      Breath sounds: Normal breath sounds. Musculoskeletal:      Right lower leg: No edema. Left lower leg: No edema. Skin:     General: Skin is warm and dry. Neurological:      General: No focal deficit present. Mental Status: She is alert and oriented to person, place, and time. Psychiatric:         Mood and Affect: Mood normal.         Behavior: Behavior normal.         ASSESSMENT and PLAN    ICD-10-CM ICD-9-CM                         4. Type 2 diabetes with nephropathy (HCC)  Z06.39 771.41 METABOLIC PANEL, COMPREHENSIVE     583.81 HEMOGLOBIN A1C W/O EAG   5. Essential hypertension  F60 877.5 METABOLIC PANEL, COMPREHENSIVE   6. Dyslipidemia  E78.5 272.4 LIPID PANEL   7. Lumbosacral spondylosis without myelopathy  M47.817 721.3 gabapentin (NEURONTIN) 300 mg capsule   8. Sacroiliitis, not elsewhere classified ([facility] [address]  M46.1 720.2 gabapentin (NEURONTIN) 300 mg capsule   9. Fibromyalgia  M79.7 729.1 methylphenidate HCl (Ritalin) 10 mg tablet      gabapentin (NEURONTIN) 300 mg capsule   10. Depression, unspecified depression type  F32.9 311 methylphenidate HCl (Ritalin) 10 mg tablet   11.  Medication refill  Z76.0 V68.1 cyclobenzaprine (FLEXERIL) 10 mg tablet      promethazine (PHENERGAN) 25 mg tablet      LORazepam (ATIVAN) 2 mg tablet      atenoloL (Tenormin) 25 mg tablet      methylphenidate HCl (Ritalin) 10 mg tablet      BP looks good    DM has been controlled    Update lab    Refills as noted    Pt will f/u on mammogram and eye exams    F/u 6 months HTN, DM, fibromyalgia            This is the Subsequent Medicare Annual Wellness Exam, performed 12 months or more after the Initial AWV or the last Subsequent AWV    I have reviewed the patient's medical history in detail and updated the computerized patient record.      History     Patient Active Problem List   Diagnosis Code    Degeneration of lumbar or lumbosacral intervertebral disc M51.37    Lumbosacral radiculopathy M54.17    Lumbosacral spondylosis without myelopathy M47.817    Dyslipidemia E78.5    Enthesopathy of hip region M76.899    Essential hypertension I10    Chronic pain G89.29    Fibromyalgia M79.7    Seizure disorder ([facility] [address] G40.909    H/O viral encephalitis Z86.61    Chronic sialoadenitis K11.23    Type 2 diabetes mellitus without complication, without long-term current use of insulin ([facility]) E11.9    Generalized OA M15.9    Encounter for long-term (current) use of high-risk medication Z79.899    Intractable migraine without status migrainosus G43.919    Bilateral occipital neuralgia M54.81    Abnormal posture R29.3    Chronic pain syndrome G89.4    Opioid use disorder, moderate, dependence ([facility]) F11.20    Trochanteric bursitis of both hips M70.61, M70.62    Type 2 diabetes with nephropathy ([facility]) E11.21    Spondylolisthesis of lumbar region M43.16    Bilateral sciatica M54.31, M54.32    Right arm pain M79.601    Frequent falls R29.6    Other headache syndrome G44.89     Past Medical History:   Diagnosis Date    Anxiety     Cataract 6/19/15    bilat    Cervical nerve root impingement 10/14/2020    C5-6, by EMG    Chronic sialoadenitis     Degenerative disc disease     Depression     Diabetes ([facility] [address] 2012    Dry eye     Dyslipidemia     Fatigue     uses ritalin for this    Fibromyalgia     GERD (gastroesophageal reflux disease)     Headache(784.0)     daily, all her life    Hypertension     OCD (obsessive compulsive disorder)     PTSD (post-traumatic stress disorder)     Seizure disorder ([facility] [address]     Thyroid dysfunction     TMJ (temporomandibular joint disorder)     right    Viral encephalitis     unknown type from mosquito      Past Surgical History:   Procedure Laterality Date    HX  SECTION      X 2    HX COLONOSCOPY  2012    ? f/u    HX PARTIAL HYSTERECTOMY       Current Outpatient Medications   Medication Sig Dispense Refill    cyclobenzaprine (FLEXERIL) 10 mg tablet TAKE 1 TABLET BY MOUTH TWICE DAILY  Indications: disorder characterized by stiff, tender & painful muscles 180 Tab 5    promethazine (PHENERGAN) 25 mg tablet Take 1 Tab by mouth every six (6) hours as needed for Nausea. Indications: nausea 60 Tab 5    LORazepam (ATIVAN) 2 mg tablet TAKE 1 TABLET BY MOUTH TWICE DAILY 60 Tab 5    atenoloL (Tenormin) 25 mg tablet Take 1 Tab by mouth daily. Indications: high blood pressure 90 Tab 4    methylphenidate HCl (Ritalin) 10 mg tablet Take 1 Tab by mouth three (3) times daily. Max Daily Amount: 30 mg. ADJUNCT FOR DEPRESSION/FIBROMYALGIA 90 Tab 0    gabapentin (NEURONTIN) 300 mg capsule Take 1 Cap by mouth three (3) times daily. Max Daily Amount: 900 mg. 270 Cap 1    metFORMIN ER (GLUCOPHAGE XR) 750 mg tablet TAKE 1 TABLET BY MOUTH DAILY FOR DIABETES 90 Tab 4    DULoxetine (CYMBALTA) 60 mg capsule TAKE ONE CAPSULE BY MOUTH EVERY DAY 90 Cap 4    lidocaine (LIDODERM) 5 % by TransDERmal route every twenty-four (24) hours. Apply patch to the affected area for 12 hours a day and remove for 12 hours a day.  HYDROcodone-acetaminophen (NORCO) 5-325 mg per tablet Take  by mouth.       simvastatin (ZOCOR) 40 mg tablet TAKE 1 TABLET BY MOUTH EVERY NIGHT AT BEDTIME  Indications: high cholesterol 90 Tab 4    menthol (MINERAL ICE EX) by Apply Externally route two (2) times daily as needed. Patient states she alternates with Volteren gel      topiramate (TOPAMAX) 200 mg tablet Take 1 Tab by mouth two (2) times a day. 180 Tab 3    TENS Units (TENS 504) dean Please provide patient with a TENS unit to help improve function, improve quality of life, reduce medication use, improve ROM. Multifactorial complex widespread chronic pain with contribution from lumbosacral spondylosis, bilateral greater trochanteric bursitis, bilateral piriformis syndrome, post (Patient taking differently: 2.17.20 Patient states she has not ever received TENS unit  Please provide patient with a TENS unit to help improve function, improve quality of life, reduce medication use, improve ROM.    Multifactorial complex widespread chronic pain with contribution from lumbosacral spondylosis, bilateral greater trochanteric bursitis, bilateral piriformis syndrome, post) 1 Device 0    senna-docusate (PERICOLACE) 8.6-50 mg per tablet 1-2 tabs daily as needed for constipation 30 Tab 0    NEXIUM 40 mg capsule TK 1 C PO QD AT 3PM  11    OTHER DDS Lumbar Traction Belt  Use as directed 1 Device prn    zonisamide (ZONEGRAN) 100 mg capsule TK 1 C PO 1 TIME ATN       Allergies   Allergen Reactions    Zanaflex [Tizanidine] Other (comments)     Nausea and worsening headache       Family History   Problem Relation Age of Onset    Alcohol abuse Father     Diabetes Sister     Migraines Daughter     Diabetes Maternal Aunt     Cancer Maternal Aunt     Diabetes Maternal Uncle     Diabetes Paternal Aunt     Diabetes Paternal Uncle     Heart Attack Maternal Grandmother      Social History     Tobacco Use    Smoking status: Never Smoker    Smokeless tobacco: Never Used   Substance Use Topics    Alcohol use: No       Depression Risk Factor Screening:     3 most recent PHQ Screens 5/27/2020   PHQ Not Done -   Little interest or pleasure in doing things Not at all   Feeling down, depressed, irritable, or hopeless Not at all   Total Score PHQ 2 0   Trouble falling or staying asleep, or sleeping too much -   Feeling tired or having little energy -   Poor appetite, weight loss, or overeating -   Feeling bad about yourself - or that you are a failure or have let yourself or your family down -   Trouble concentrating on things such as school, work, reading, or watching TV -   Moving or speaking so slowly that other people could have noticed; or the opposite being so fidgety that others notice -   Thoughts of being better off dead, or hurting yourself in some way -   PHQ 9 Score -   How difficult have these problems made it for you to do your work, take care of your home and get along with others -       Alcohol Risk Screen   Do you average more than 1 drink per night or more than 7 drinks a week:  No    On any one occasion in the past three months have you have had more than 3 drinks containing alcohol:  No        Functional Ability and Level of Safety:   Hearing: Hearing is good. Activities of Daily Living: The home contains: no safety equipment. and   lives in a senior apt which is handicap accessible. Has bars and a pull cord  Patient needs help with:  transportation, shopping, dressing and walking     Ambulation: with difficulty, can't walk further than 0.5 blocks     Fall Risk:  Fall Risk Assessment, last 12 mths 4/4/2018   Able to walk? Yes   Fall in past 12 months?  No     Abuse Screen:  Patient is not abused       Cognitive Screening   Has your family/caregiver stated any concerns about your memory: no     Cognitive Screening: Normal - Animal Naming Test    Patient Care Team   Patient Care Team:  Vani Ttutle MD as PCP - General (Internal Medicine)  Vani Tuttle MD as PCP - [facility] Empaneled Provider  Teresa Cadena MD as Physician (Neurology)  Amber Serrato MD (Pain Management)  Steph Baron MD as Consulting Provider (Otolaryngology)  Scoper, Araceli Daniels MD as Physician (Ophthalmology)  Rhett Royal, NETO as Consulting Provider (Optometry)  Kennedy Andujar MD (Neurology)    Assessment/Plan   Education and counseling provided:  Are appropriate based on today's review and evaluation    Diagnoses and all orders for this visit:    1. Medicare annual wellness visit, subsequent    2. Screen for colon cancer  -     OCCULT BLOOD IMMUNOASSAY,DIAGNOSTIC; Future    3.  Needs flu shot  -     INFLUENZA VIRUS VAC QUAD,SPLIT,PRESV FREE SYRINGE IM      Health Maintenance Due   Topic Date Due    Eye Exam Retinal or Dilated  06/19/2017    Breast Cancer Screen Mammogram  01/17/2019    Medicare Yearly Exam  06/13/2020    Flu Vaccine (1) 09/01/2020    FOBT Q1Y Age,18+  09/27/2020    PAP AKA CERVICAL CYTOLOGY  12/26/2020

## 2020-10-14 NOTE — PATIENT INSTRUCTIONS
Medicare Wellness Visit, Female The best way to live healthy is to have a lifestyle where you eat a well-balanced diet, exercise regularly, limit alcohol use, and quit all forms of tobacco/nicotine, if applicable. Regular preventive services are another way to keep healthy. Preventive services (vaccines, screening tests, monitoring & exams) can help personalize your care plan, which helps you manage your own care. Screening tests can find health problems at the earliest stages, when they are easiest to treat. [facility] follows the current, evidence-based guidelines published by the [facility] ([facility]) when recommending preventive services for our patients. Because we follow these guidelines, sometimes recommendations change over time as research supports it. (For example, mammograms used to be recommended annually. Even though Medicare will still pay for an annual mammogram, the newer guidelines recommend a mammogram every two years for women of average risk). Of course, you and your doctor may decide to screen more often for some diseases, based on your risk and your co-morbidities (chronic disease you are already diagnosed with). Preventive services for you include: - Medicare offers their members a free annual wellness visit, which is time for you and your primary care provider to discuss and plan for your preventive service needs. Take advantage of this benefit every year! 
-All adults over the age of 72 should receive the recommended pneumonia vaccines. Current USPSTF guidelines recommend a series of two vaccines for the best pneumonia protection.  
-All adults should have a flu vaccine yearly and a tetanus vaccine every 10 years.  
-All adults age 48 and older should receive the shingles vaccines (series of two vaccines). -All adults age 38-68 who are overweight should have a diabetes screening test once every three years. -All adults born between [birth year] and 1967 should be screened once for Hepatitis C. 
-Other screening tests and preventive services for persons with diabetes include: an eye exam to screen for diabetic retinopathy, a kidney function test, a foot exam, and stricter control over your cholesterol.  
-Cardiovascular screening for adults with routine risk involves an electrocardiogram (ECG) at intervals determined by your doctor.  
-Colorectal cancer screenings should be done for adults age 54-65 with no increased risk factors for colorectal cancer. There are a number of acceptable methods of screening for this type of cancer. Each test has its own benefits and drawbacks. Discuss with your doctor what is most appropriate for you during your annual wellness visit. The different tests include: colonoscopy (considered the best screening method), a fecal occult blood test, a fecal DNA test, and sigmoidoscopy. 
 
-A bone mass density test is recommended when a woman turns 65 to screen for osteoporosis. This test is only recommended one time, as a screening. Some providers will use this same test as a disease monitoring tool if you already have osteoporosis. -Breast cancer screenings are recommended every other year for women of normal risk, age 54-69. 
-Cervical cancer screenings for women over age 72 are only recommended with certain risk factors. Here is a list of your current Health Maintenance items (your personalized list of preventive services) with a due date: 
Health Maintenance Due Topic Date Due Devan West Eye Exam  06/19/2017  Mammogram  01/17/2019 Devan West Annual Well Visit  06/13/2020  Yearly Flu Vaccine (1) 09/01/2020  Stool testing for trace blood  09/27/2020  Pap Test  12/26/2020

## 2020-10-14 NOTE — PROGRESS NOTES
Influenza iImmunization administered left deltoid  10/14/2020 by Chucho Velasco LPN with pt's consent. Patient tolerated procedure well. No reactions noted.

## 2020-10-14 NOTE — PROGRESS NOTES
ROOM # 4    Liz Cai presents today for   Chief Complaint   Patient presents with    Hypertension       Liz Cai preferred language for health care discussion is english/other. Is someone accompanying this pt? NO    Is the patient using any DME equipment during OV?  NO    Depression Screening:  3 most recent PHQ Screens 5/27/2020 11/26/2018 10/29/2018 10/15/2018 10/4/2018 8/30/2018 8/2/2018   PHQ Not Done - - - - - - Active Diagnosis of Depression or Bipolar Disorder   Little interest or pleasure in doing things Not at all Not at all Nearly every day Not at all More than half the days Not at all -   Feeling down, depressed, irritable, or hopeless Not at all Not at all Nearly every day Not at all Not at all Not at all -   Total Score PHQ 2 0 0 6 0 2 0 -   Trouble falling or staying asleep, or sleeping too much - - Nearly every day - Nearly every day - -   Feeling tired or having little energy - - Nearly every day - Nearly every day - -   Poor appetite, weight loss, or overeating - - Nearly every day - Nearly every day - -   Feeling bad about yourself - or that you are a failure or have let yourself or your family down - - Not at all - Not at all - -   Trouble concentrating on things such as school, work, reading, or watching TV - - Not at all - Several days - -   Moving or speaking so slowly that other people could have noticed; or the opposite being so fidgety that others notice - - Nearly every day - Not at all - -   Thoughts of being better off dead, or hurting yourself in some way - - Not at all - Not at all - -   PHQ 9 Score - - 18 - 12 - -   How difficult have these problems made it for you to do your work, take care of your home and get along with others - - Extremely difficult - Very difficult - -       Learning Assessment:  Learning Assessment 1/29/2018 10/25/2017 4/1/2015 2/6/2015 1/27/2014   PRIMARY LEARNER Patient Patient Patient Patient Patient   HIGHEST LEVEL OF EDUCATION - PRIMARY LEARNER  DID NOT GRADUATE HIGH SCHOOL DID NOT GRADUATE HIGH SCHOOL - - -   BARRIERS PRIMARY LEARNER NONE NONE - - -   CO-LEARNER CAREGIVER No No - - -   PRIMARY LANGUAGE ENGLISH ENGLISH ENGLISH ENGLISH ENGLISH   LEARNER PREFERENCE PRIMARY DEMONSTRATION DEMONSTRATION LISTENING LISTENING DEMONSTRATION     - - - READING -   ANSWERED BY self self patient patient patient   RELATIONSHIP SELF SELF SELF SELF SELF       Abuse Screening:  Abuse Screening Questionnaire 6/13/2019 1/29/2018 7/24/2017 4/13/2015   Do you ever feel afraid of your partner? N N N N   Are you in a relationship with someone who physically or mentally threatens you? N N N N   [facility]       Fall Risk  Fall Risk Assessment, last 12 mths 4/4/2018 1/29/2018 7/24/2017 1/27/2014   Able to walk? Yes Yes Yes Yes   Fall in past 12 months? No No No No           Health Maintenance reviewed and discussed per provider. Yes    Christopher Stiles is due for   Health Maintenance Due   Topic Date Due    DTaP/Tdap/Td series (1 - Tdap) 03/18/1979    Eye Exam Retinal or Dilated  06/19/2017    Breast Cancer Screen Mammogram  01/17/2019    Medicare Yearly Exam  06/13/2020    Flu Vaccine (1) 09/01/2020    FOBT Q1Y Age,18+  09/27/2020    PAP AKA CERVICAL CYTOLOGY  12/26/2020     Please order/place referral if appropriate. Advance Directive:  1. Do you have an advance directive in place? Patient Reply: NO    2. If not, would you like material regarding how to put one in place? Patient Reply: NO    Coordination of Care:  1. Have you been to the ER, urgent care clinic since your last visit? Hospitalized since your last visit? NO    2. Have you seen or consulted any other health care providers outside of the [address] [place] [address] since your last visit? Include any pap smears or colon screening.  Neurologist

## 2020-10-15 LAB
ALBUMIN SERPL-MCNC: 4.7 G/DL (ref 3.8–4.8)
ALBUMIN/GLOB SERPL: 1.7 {RATIO} (ref 1.2–2.2)
ALP SERPL-CCNC: 46 IU/L (ref 39–117)
ALT SERPL-CCNC: 9 IU/L (ref 0–32)
AST SERPL-CCNC: 18 IU/L (ref 0–40)
BILIRUB SERPL-MCNC: <0.2 MG/DL (ref 0–1.2)
BUN SERPL-MCNC: 18 MG/DL (ref 8–27)
BUN/CREAT SERPL: 11 (ref 12–28)
CALCIUM SERPL-MCNC: 9.3 MG/DL (ref 8.7–10.3)
CHLORIDE SERPL-SCNC: 106 MMOL/L (ref 96–106)
CHOLEST SERPL-MCNC: 126 MG/DL (ref 100–199)
CO2 SERPL-SCNC: 16 MMOL/L (ref 20–29)
CREAT SERPL-MCNC: 1.59 MG/DL (ref 0.57–1)
GLOBULIN SER CALC-MCNC: 2.7 G/DL (ref 1.5–4.5)
GLUCOSE SERPL-MCNC: ABNORMAL MG/DL
HBA1C MFR BLD: 5.6 % (ref 4.8–5.6)
HDLC SERPL-MCNC: 42 MG/DL
INTERPRETATION, 910389: NORMAL
LDLC SERPL CALC-MCNC: 61 MG/DL (ref 0–99)
POTASSIUM SERPL-SCNC: ABNORMAL MMOL/L
PROT SERPL-MCNC: 7.4 G/DL (ref 6–8.5)
SODIUM SERPL-SCNC: 141 MMOL/L (ref 134–144)
TRIGL SERPL-MCNC: 130 MG/DL (ref 0–149)
VLDLC SERPL CALC-MCNC: 23 MG/DL (ref 5–40)

## 2020-10-19 DIAGNOSIS — N28.9 ACUTE RENAL INSUFFICIENCY: ICD-10-CM

## 2020-10-19 DIAGNOSIS — E11.21 CONTROLLED TYPE 2 DIABETES MELLITUS WITH DIABETIC NEPHROPATHY, WITHOUT LONG-TERM CURRENT USE OF INSULIN (HCC): Primary | ICD-10-CM

## 2020-11-26 DIAGNOSIS — Z76.0 MEDICATION REFILL: ICD-10-CM

## 2020-11-27 RX ORDER — LORAZEPAM 2 MG/1
TABLET ORAL
Qty: 60 TAB | Refills: 5 | Status: SHIPPED | OUTPATIENT
Start: 2020-11-27 | End: 2021-01-26 | Stop reason: SDUPTHER

## 2020-12-16 LAB — HEMOCCULT STL QL IA: NEGATIVE

## 2020-12-20 DIAGNOSIS — Z76.0 MEDICATION REFILL: ICD-10-CM

## 2020-12-20 RX ORDER — ATENOLOL 25 MG/1
TABLET ORAL
Qty: 90 TAB | Refills: 4 | Status: SHIPPED | OUTPATIENT
Start: 2020-12-20 | End: 2021-01-26 | Stop reason: SDUPTHER

## 2020-12-21 DIAGNOSIS — Z76.0 MEDICATION REFILL: ICD-10-CM

## 2020-12-21 DIAGNOSIS — M46.1 SACROILIITIS, NOT ELSEWHERE CLASSIFIED (HCC): ICD-10-CM

## 2020-12-21 DIAGNOSIS — M47.817 LUMBOSACRAL SPONDYLOSIS WITHOUT MYELOPATHY: ICD-10-CM

## 2020-12-21 DIAGNOSIS — M79.7 FIBROMYALGIA: ICD-10-CM

## 2020-12-22 RX ORDER — TOPIRAMATE 200 MG/1
TABLET ORAL
Qty: 180 TAB | Refills: 3 | Status: SHIPPED | OUTPATIENT
Start: 2020-12-22 | End: 2021-01-26 | Stop reason: SDUPTHER

## 2020-12-22 RX ORDER — TOPIRAMATE 200 MG/1
TABLET ORAL
Qty: 180 TAB | Refills: 3 | Status: SHIPPED | OUTPATIENT
Start: 2020-12-22 | End: 2022-02-20

## 2021-01-26 ENCOUNTER — OFFICE VISIT (OUTPATIENT)
Dept: INTERNAL MEDICINE CLINIC | Age: 61
End: 2021-01-26
Payer: MEDICARE

## 2021-01-26 VITALS
BODY MASS INDEX: 29.45 KG/M2 | WEIGHT: 156 LBS | HEIGHT: 61 IN | HEART RATE: 86 BPM | RESPIRATION RATE: 20 BRPM | SYSTOLIC BLOOD PRESSURE: 106 MMHG | TEMPERATURE: 97.5 F | OXYGEN SATURATION: 99 % | DIASTOLIC BLOOD PRESSURE: 69 MMHG

## 2021-01-26 DIAGNOSIS — M46.1 SACROILIITIS, NOT ELSEWHERE CLASSIFIED (HCC): ICD-10-CM

## 2021-01-26 DIAGNOSIS — R30.0 DYSURIA: Primary | ICD-10-CM

## 2021-01-26 DIAGNOSIS — F32.A DEPRESSION, UNSPECIFIED DEPRESSION TYPE: ICD-10-CM

## 2021-01-26 DIAGNOSIS — E78.5 DYSLIPIDEMIA: Chronic | ICD-10-CM

## 2021-01-26 DIAGNOSIS — E11.9 TYPE 2 DIABETES MELLITUS WITHOUT COMPLICATION, WITHOUT LONG-TERM CURRENT USE OF INSULIN (HCC): Chronic | ICD-10-CM

## 2021-01-26 DIAGNOSIS — G89.4 CHRONIC PAIN SYNDROME: ICD-10-CM

## 2021-01-26 DIAGNOSIS — Z76.0 MEDICATION REFILL: ICD-10-CM

## 2021-01-26 DIAGNOSIS — Z79.899 ENCOUNTER FOR LONG-TERM (CURRENT) USE OF HIGH-RISK MEDICATION: ICD-10-CM

## 2021-01-26 DIAGNOSIS — M79.7 FIBROMYALGIA: ICD-10-CM

## 2021-01-26 DIAGNOSIS — M47.817 LUMBOSACRAL SPONDYLOSIS WITHOUT MYELOPATHY: ICD-10-CM

## 2021-01-26 DIAGNOSIS — E11.21 TYPE 2 DIABETES WITH NEPHROPATHY (HCC): ICD-10-CM

## 2021-01-26 DIAGNOSIS — I10 ESSENTIAL HYPERTENSION: Chronic | ICD-10-CM

## 2021-01-26 PROBLEM — F11.20 OPIOID USE DISORDER, MODERATE, DEPENDENCE (HCC): Status: RESOLVED | Noted: 2018-06-26 | Resolved: 2021-01-26

## 2021-01-26 PROCEDURE — 99214 OFFICE O/P EST MOD 30 MIN: CPT | Performed by: INTERNAL MEDICINE

## 2021-01-26 PROCEDURE — G8752 SYS BP LESS 140: HCPCS | Performed by: INTERNAL MEDICINE

## 2021-01-26 PROCEDURE — G8419 CALC BMI OUT NRM PARAM NOF/U: HCPCS | Performed by: INTERNAL MEDICINE

## 2021-01-26 PROCEDURE — G8754 DIAS BP LESS 90: HCPCS | Performed by: INTERNAL MEDICINE

## 2021-01-26 PROCEDURE — G8432 DEP SCR NOT DOC, RNG: HCPCS | Performed by: INTERNAL MEDICINE

## 2021-01-26 PROCEDURE — 2022F DILAT RTA XM EVC RTNOPTHY: CPT | Performed by: INTERNAL MEDICINE

## 2021-01-26 PROCEDURE — G8427 DOCREV CUR MEDS BY ELIG CLIN: HCPCS | Performed by: INTERNAL MEDICINE

## 2021-01-26 PROCEDURE — 3046F HEMOGLOBIN A1C LEVEL >9.0%: CPT | Performed by: INTERNAL MEDICINE

## 2021-01-26 PROCEDURE — 3017F COLORECTAL CA SCREEN DOC REV: CPT | Performed by: INTERNAL MEDICINE

## 2021-01-26 RX ORDER — SULFAMETHOXAZOLE AND TRIMETHOPRIM 800; 160 MG/1; MG/1
1 TABLET ORAL 2 TIMES DAILY
Qty: 14 TAB | Refills: 0 | Status: SHIPPED | OUTPATIENT
Start: 2021-01-26 | End: 2021-02-02

## 2021-01-26 RX ORDER — METHYLPHENIDATE HYDROCHLORIDE 10 MG/1
10 TABLET ORAL 3 TIMES DAILY
Qty: 90 TAB | Refills: 0 | Status: SHIPPED | OUTPATIENT
Start: 2021-03-22 | End: 2021-04-26 | Stop reason: SDUPTHER

## 2021-01-26 RX ORDER — GABAPENTIN 300 MG/1
300 CAPSULE ORAL 3 TIMES DAILY
Qty: 270 CAP | Refills: 1 | Status: SHIPPED | OUTPATIENT
Start: 2021-01-26 | End: 2021-05-27 | Stop reason: SDUPTHER

## 2021-01-26 RX ORDER — LORAZEPAM 2 MG/1
2 TABLET ORAL 2 TIMES DAILY
Qty: 60 TAB | Refills: 5 | Status: SHIPPED | OUTPATIENT
Start: 2021-01-26 | End: 2021-06-01 | Stop reason: SDUPTHER

## 2021-01-26 RX ORDER — SIMVASTATIN 40 MG/1
TABLET, FILM COATED ORAL
Qty: 90 TAB | Refills: 4 | Status: SHIPPED | OUTPATIENT
Start: 2021-01-26 | End: 2021-05-25

## 2021-01-26 RX ORDER — ATENOLOL 25 MG/1
25 TABLET ORAL DAILY
Qty: 90 TAB | Refills: 3 | Status: SHIPPED | OUTPATIENT
Start: 2021-01-26 | End: 2021-11-26

## 2021-01-26 RX ORDER — DULOXETIN HYDROCHLORIDE 60 MG/1
60 CAPSULE, DELAYED RELEASE ORAL DAILY
Qty: 90 CAP | Refills: 4 | Status: SHIPPED | OUTPATIENT
Start: 2021-01-26 | End: 2022-02-08 | Stop reason: SDUPTHER

## 2021-01-26 RX ORDER — METHYLPHENIDATE HYDROCHLORIDE 10 MG/1
10 TABLET ORAL
Qty: 90 TAB | Refills: 0 | Status: SHIPPED | OUTPATIENT
Start: 2021-02-24 | End: 2021-04-26 | Stop reason: SDUPTHER

## 2021-01-26 RX ORDER — TOPIRAMATE 200 MG/1
200 TABLET ORAL 2 TIMES DAILY
Qty: 180 TAB | Refills: 3 | Status: SHIPPED | OUTPATIENT
Start: 2021-01-26 | End: 2021-04-26

## 2021-01-26 RX ORDER — AMOXICILLIN 250 MG
CAPSULE ORAL
Qty: 30 TAB | Refills: 0 | Status: CANCELLED | OUTPATIENT
Start: 2021-01-26

## 2021-01-26 RX ORDER — CYCLOBENZAPRINE HCL 10 MG
TABLET ORAL
Qty: 180 TAB | Refills: 5 | Status: SHIPPED | OUTPATIENT
Start: 2021-01-26 | End: 2022-01-19

## 2021-01-26 RX ORDER — METFORMIN HYDROCHLORIDE 750 MG/1
750 TABLET, EXTENDED RELEASE ORAL DAILY
Qty: 90 TAB | Refills: 4 | Status: SHIPPED | OUTPATIENT
Start: 2021-01-26 | End: 2022-02-10

## 2021-01-26 RX ORDER — METHYLPHENIDATE HYDROCHLORIDE 10 MG/1
10 TABLET ORAL 3 TIMES DAILY
Qty: 90 TAB | Refills: 0 | Status: SHIPPED | OUTPATIENT
Start: 2021-01-26 | End: 2021-04-26 | Stop reason: SDUPTHER

## 2021-01-26 NOTE — PROGRESS NOTES
HISTORY OF PRESENT ILLNESS  Reji Ortega is a 56 y.o. female. Visit Vitals  /69 (BP 1 Location: Left arm, BP Patient Position: Sitting)   Pulse 86   Temp 97.5 °F (36.4 °C) (Temporal)   Resp 20   Ht 5' 1\" (1.549 m)   Wt 156 lb (70.8 kg)   SpO2 99%   BMI 29.48 kg/m²       Pt is very vague today    Over a month has had some right flank pain that is different from her usual pain  When she voids, she notes pain as she finished. Odor is different  Urine bubbles        She also asked about going back on ritalin. States she has no energy. Was last on it in [facility] ran out. Hypertension   The history is provided by the patient. This is a chronic problem. The current episode started more than 1 week ago. Associated symptoms include chest pain, malaise/fatigue, headaches, neck pain and dizziness. Diabetes  Associated symptoms include chest pain, abdominal pain and headaches. Medication Refill  Associated symptoms include chest pain, abdominal pain and headaches. Review of Systems   Constitutional: Positive for diaphoresis and malaise/fatigue. Negative for chills and fever. Cardiovascular: Positive for chest pain. Gastrointestinal: Positive for abdominal pain. Musculoskeletal: Positive for back pain, joint pain, myalgias and neck pain. Neurological: Positive for dizziness and headaches. Psychiatric/Behavioral: Positive for depression. Physical Exam  Vitals signs and nursing note reviewed. Constitutional:       Appearance: Normal appearance. She is well-developed. She is ill-appearing. HENT:      Head: Normocephalic and atraumatic. Cardiovascular:      Rate and Rhythm: Normal rate and regular rhythm. Pulmonary:      Effort: Pulmonary effort is normal.      Breath sounds: Normal breath sounds. Abdominal:      General: Abdomen is flat. There is no distension. Tenderness: There is abdominal tenderness (? subjective tenderness lower abdomen).  There is no right CVA tenderness or left CVA tenderness. Skin:     General: Skin is warm and dry. Neurological:      General: No focal deficit present. Mental Status: She is alert and oriented to person, place, and time. Psychiatric:         Attention and Perception: She is inattentive. Mood and Affect: Mood is depressed. Speech: Speech is delayed. Behavior: Behavior is slowed. Comments:  Appeared greatly distressed today. ASSESSMENT and PLAN    ICD-10-CM ICD-9-CM    1. Dysuria  R30.0 788. 1 CULTURE, URINE      URINALYSIS W/ RFLX MICROSCOPIC      trimethoprim-sulfamethoxazole (BACTRIM DS, SEPTRA DS) 160-800 mg per tablet   2. Fibromyalgia  M79.7 729.1 methylphenidate HCl (Ritalin) 10 mg tablet      gabapentin (NEURONTIN) 300 mg capsule      DULoxetine (CYMBALTA) 60 mg capsule      topiramate (TOPAMAX) 200 mg tablet      methylphenidate HCl (Ritalin) 10 mg tablet      methylphenidate HCl (RITALIN) 10 mg tablet   3. Depression, unspecified depression type  F32.9 311 methylphenidate HCl (Ritalin) 10 mg tablet   4. Lumbosacral spondylosis without myelopathy  M47.817 721.3 gabapentin (NEURONTIN) 300 mg capsule      DULoxetine (CYMBALTA) 60 mg capsule      topiramate (TOPAMAX) 200 mg tablet   5. Sacroiliitis, not elsewhere classified ([facility] [address]  M46.1 720.2 gabapentin (NEURONTIN) 300 mg capsule      DULoxetine (CYMBALTA) 60 mg capsule      topiramate (TOPAMAX) 200 mg tablet   6. Chronic pain syndrome  G89.4 338.4    7. Type 2 diabetes mellitus without complication, without long-term current use of insulin ([facility])  L44.6 426.14 METABOLIC PANEL, COMPREHENSIVE      HEMOGLOBIN A1C W/O EAG   8. Essential hypertension  N08 424.1 METABOLIC PANEL, COMPREHENSIVE   9. Dyslipidemia  E78.5 272.4    10. Encounter for long-term (current) use of high-risk medication  Z79.899 V58.69    11.  Medication refill  Z76.0 V68.1 methylphenidate HCl (Ritalin) 10 mg tablet      metFORMIN ER (GLUCOPHAGE XR) 750 mg tablet gabapentin (NEURONTIN) 300 mg capsule      DULoxetine (CYMBALTA) 60 mg capsule      cyclobenzaprine (FLEXERIL) 10 mg tablet      LORazepam (ATIVAN) 2 mg tablet      atenoloL (TENORMIN) 25 mg tablet      simvastatin (ZOCOR) 40 mg tablet      topiramate (TOPAMAX) 200 mg tablet      methylphenidate HCl (Ritalin) 10 mg tablet      methylphenidate HCl (RITALIN) 10 mg tablet   12. Type 2 diabetes with nephropathy (HCC)  E11.21 250.40      583.81      Positive ROS    Chronic pain issues    Dysuria--need to check urine for signs of infection.   Cannot send pyridium due to mild CRI    Refills as noted    F/u 3 months

## 2021-01-26 NOTE — PROGRESS NOTES
ROOM # 5    Kerrieisac Toro presents today for   Chief Complaint   Patient presents with    Hypertension    Diabetes    Medication Refill       Kerrie Toro preferred language for health care discussion is english/other.     Is someone accompanying this pt? no    Is the patient using any DME equipment during [address]? no    Depression Screening:  3 most recent PHQ Screens 5/27/2020 11/26/2018 10/29/2018 10/15/2018 10/4/2018 8/30/2018 8/2/2018   PHQ Not Done - - - - - - Active Diagnosis of Depression or Bipolar Disorder   Little interest or pleasure in doing things Not at all Not at all Nearly every day Not at all More than half the days Not at all -   Feeling down, depressed, irritable, or hopeless Not at all Not at all Nearly every day Not at all Not at all Not at all -   Total Score PHQ 2 0 0 6 0 2 0 -   Trouble falling or staying asleep, or sleeping too much - - Nearly every day - Nearly every day - -   Feeling tired or having little energy - - Nearly every day - Nearly every day - -   Poor appetite, weight loss, or overeating - - Nearly every day - Nearly every day - -   Feeling bad about yourself - or that you are a failure or have let yourself or your family down - - Not at all - Not at all - -   Trouble concentrating on things such as school, work, reading, or watching TV - - Not at all - Several days - -   Moving or speaking so slowly that other people could have noticed; or the opposite being so fidgety that others notice - - Nearly every day - Not at all - -   Thoughts of being better off dead, or hurting yourself in some way - - Not at all - Not at all - -   PHQ 9 Score - - 18 - 12 - -   How difficult have these problems made it for you to do your work, take care of your home and get along with others - - Extremely difficult - Very difficult - -       Learning Assessment:  Learning Assessment 1/29/2018 10/25/2017 4/1/2015 2/6/2015 1/27/2014   PRIMARY LEARNER Patient Patient Patient Patient Patient HIGHEST LEVEL OF EDUCATION - PRIMARY LEARNER  DID NOT GRADUATE HIGH SCHOOL DID NOT GRADUATE HIGH SCHOOL - - -   BARRIERS PRIMARY LEARNER NONE NONE - - -   CO-LEARNER CAREGIVER No No - - -   PRIMARY LANGUAGE ENGLISH ENGLISH ENGLISH ENGLISH ENGLISH   LEARNER PREFERENCE PRIMARY DEMONSTRATION DEMONSTRATION LISTENING LISTENING DEMONSTRATION     - - - READING -   ANSWERED BY self self patient patient patient   RELATIONSHIP SELF SELF SELF SELF SELF       Abuse Screening:  Abuse Screening Questionnaire 6/13/2019 1/29/2018 7/24/2017 4/13/2015   Do you ever feel afraid of your partner? N N N N   Are you in a relationship with someone who physically or mentally threatens you? N N N N   Is it safe for you to go home? Madi Childers       Fall Risk  Fall Risk Assessment, last 12 mths 4/4/2018 1/29/2018 7/24/2017 1/27/2014   Able to walk? Yes Yes Yes Yes   Fall in past 12 months? No No No No           Health Maintenance reviewed and discussed per provider. Yes    Hiren Vega is due for   Health Maintenance Due   Topic Date Due    COVID-19 Vaccine (1 of 2) 03/18/1976    Eye Exam Retinal or Dilated  06/19/2017    Breast Cancer Screen Mammogram  01/17/2019    PAP AKA CERVICAL CYTOLOGY  12/26/2020     Please order/place referral if appropriate. Advance Directive:  1. Do you have an advance directive in place? Patient Reply: no    2. If not, would you like material regarding how to put one in place? Patient Reply: no    Coordination of Care:  1. Have you been to the ER, urgent care clinic since your last visit? Hospitalized since your last visit? no    2. Have you seen or consulted any other health care providers outside of the [address] [place] [address] since your last visit? Include any pap smears or colon screening.  Neurologist

## 2021-01-29 LAB
ALBUMIN SERPL-MCNC: 4.4 G/DL (ref 3.8–4.8)
ALBUMIN/GLOB SERPL: 2.1 {RATIO} (ref 1.2–2.2)
ALP SERPL-CCNC: 54 IU/L (ref 39–117)
ALT SERPL-CCNC: 9 IU/L (ref 0–32)
APPEARANCE UR: CLEAR
AST SERPL-CCNC: 13 IU/L (ref 0–40)
BACTERIA UR CULT: NO GROWTH
BILIRUB SERPL-MCNC: <0.2 MG/DL (ref 0–1.2)
BILIRUB UR QL STRIP: NEGATIVE
BUN SERPL-MCNC: 16 MG/DL (ref 8–27)
BUN/CREAT SERPL: 13 (ref 12–28)
CALCIUM SERPL-MCNC: 9 MG/DL (ref 8.7–10.3)
CHLORIDE SERPL-SCNC: 110 MMOL/L (ref 96–106)
CO2 SERPL-SCNC: 22 MMOL/L (ref 20–29)
COLOR UR: YELLOW
CREAT SERPL-MCNC: 1.27 MG/DL (ref 0.57–1)
GLOBULIN SER CALC-MCNC: 2.1 G/DL (ref 1.5–4.5)
GLUCOSE SERPL-MCNC: 108 MG/DL (ref 65–99)
GLUCOSE UR QL: NEGATIVE
HBA1C MFR BLD: 6 % (ref 4.8–5.6)
HGB UR QL STRIP: NEGATIVE
KETONES UR QL STRIP: NEGATIVE
LEUKOCYTE ESTERASE UR QL STRIP: NEGATIVE
MICRO URNS: NORMAL
NITRITE UR QL STRIP: NEGATIVE
PH UR STRIP: 5 [PH] (ref 5–7.5)
POTASSIUM SERPL-SCNC: 4.1 MMOL/L (ref 3.5–5.2)
PROT SERPL-MCNC: 6.5 G/DL (ref 6–8.5)
PROT UR QL STRIP: NEGATIVE
SODIUM SERPL-SCNC: 144 MMOL/L (ref 134–144)
SP GR UR: 1.02 (ref 1–1.03)
UROBILINOGEN UR STRIP-MCNC: 0.2 MG/DL (ref 0.2–1)

## 2021-03-08 ENCOUNTER — TELEPHONE (OUTPATIENT)
Dept: INTERNAL MEDICINE CLINIC | Age: 61
End: 2021-03-08

## 2021-03-08 RX ORDER — ISOPROPYL ALCOHOL 70 ML/100ML
SWAB TOPICAL
Qty: 200 PAD | Refills: 5 | Status: SHIPPED | OUTPATIENT
Start: 2021-03-08 | End: 2021-03-10 | Stop reason: SDUPTHER

## 2021-03-08 RX ORDER — BLOOD GLUCOSE CONTROL HIGH,LOW
EACH MISCELLANEOUS
Qty: 1 BOTTLE | Refills: 5 | Status: SHIPPED | OUTPATIENT
Start: 2021-03-08 | End: 2021-03-10 | Stop reason: SDUPTHER

## 2021-03-08 NOTE — TELEPHONE ENCOUNTER
Pharmacy requesting refill on BD single use swab and Accu-check Chelsey solution please submit to patients pharmacy

## 2021-03-10 RX ORDER — BLOOD GLUCOSE CONTROL HIGH,LOW
EACH MISCELLANEOUS
Qty: 1 BOTTLE | Refills: 5 | Status: SHIPPED | OUTPATIENT
Start: 2021-03-10 | End: 2021-03-17 | Stop reason: SDUPTHER

## 2021-03-10 RX ORDER — ISOPROPYL ALCOHOL 70 ML/100ML
SWAB TOPICAL
Qty: 200 PAD | Refills: 5 | Status: SHIPPED | OUTPATIENT
Start: 2021-03-10 | End: 2021-03-17 | Stop reason: SDUPTHER

## 2021-03-17 RX ORDER — BLOOD GLUCOSE CONTROL HIGH,LOW
EACH MISCELLANEOUS
Qty: 1 BOTTLE | Refills: 5 | Status: SHIPPED | OUTPATIENT
Start: 2021-03-17 | End: 2022-05-11

## 2021-03-17 RX ORDER — ISOPROPYL ALCOHOL 70 ML/100ML
SWAB TOPICAL
Qty: 200 PAD | Refills: 5 | Status: SHIPPED | OUTPATIENT
Start: 2021-03-17 | End: 2022-05-04

## 2021-03-17 NOTE — TELEPHONE ENCOUNTER
Requested Prescriptions     Pending Prescriptions Disp Refills    alcohol swabs (Alcohol Pads) padm 200 Pad 5     Sig: Use as directed twice a day or as directed    Blood Glucose Control High&Low (Accu-Chek Chelsey Control Soln) soln 1 Bottle 5     Sig: Use to test meter with each new set of test strips

## 2021-04-15 DIAGNOSIS — R76.8 ANA POSITIVE: Primary | ICD-10-CM

## 2021-04-15 DIAGNOSIS — M46.1 SACROILIITIS, NOT ELSEWHERE CLASSIFIED (HCC): ICD-10-CM

## 2021-04-15 DIAGNOSIS — M79.7 FIBROMYALGIA: ICD-10-CM

## 2021-04-15 NOTE — PROGRESS NOTES
Received notes from Dr. Antony Leung. EMG reviewed a right C5-6 cervical radiculopathy  During w/u for neck pain and arm sxs including weakness, he found an ELINOR of 1:320 in a nuclear, speckled fine dense pattern. He has requested a referral to rheumatology which must come from her PCP. Rheumatology referral initiated.

## 2021-04-26 ENCOUNTER — HOSPITAL ENCOUNTER (OUTPATIENT)
Dept: LAB | Age: 61
Discharge: HOME | End: 2021-04-26
Payer: MEDICARE

## 2021-04-26 ENCOUNTER — OFFICE VISIT (OUTPATIENT)
Dept: INTERNAL MEDICINE CLINIC | Age: 61
End: 2021-04-26
Payer: MEDICARE

## 2021-04-26 VITALS
HEART RATE: 117 BPM | BODY MASS INDEX: 29.48 KG/M2 | RESPIRATION RATE: 18 BRPM | HEIGHT: 61 IN | DIASTOLIC BLOOD PRESSURE: 78 MMHG | OXYGEN SATURATION: 97 % | SYSTOLIC BLOOD PRESSURE: 134 MMHG

## 2021-04-26 DIAGNOSIS — Z51.81 MEDICATION MONITORING ENCOUNTER: ICD-10-CM

## 2021-04-26 DIAGNOSIS — E11.9 DIABETES MELLITUS WITHOUT COMPLICATION (HCC): Primary | ICD-10-CM

## 2021-04-26 DIAGNOSIS — F32.A DEPRESSION, UNSPECIFIED DEPRESSION TYPE: ICD-10-CM

## 2021-04-26 DIAGNOSIS — R30.0 DYSURIA: ICD-10-CM

## 2021-04-26 DIAGNOSIS — Z76.0 MEDICATION REFILL: ICD-10-CM

## 2021-04-26 DIAGNOSIS — Z12.31 SCREENING MAMMOGRAM FOR HIGH-RISK PATIENT: ICD-10-CM

## 2021-04-26 DIAGNOSIS — M79.7 FIBROMYALGIA: ICD-10-CM

## 2021-04-26 LAB — GLUCOSE POC: 120 MG/DL

## 2021-04-26 PROCEDURE — 87086 URINE CULTURE/COLONY COUNT: CPT

## 2021-04-26 PROCEDURE — G8752 SYS BP LESS 140: HCPCS | Performed by: INTERNAL MEDICINE

## 2021-04-26 PROCEDURE — G8419 CALC BMI OUT NRM PARAM NOF/U: HCPCS | Performed by: INTERNAL MEDICINE

## 2021-04-26 PROCEDURE — G0480 DRUG TEST DEF 1-7 CLASSES: HCPCS

## 2021-04-26 PROCEDURE — G8427 DOCREV CUR MEDS BY ELIG CLIN: HCPCS | Performed by: INTERNAL MEDICINE

## 2021-04-26 PROCEDURE — 99214 OFFICE O/P EST MOD 30 MIN: CPT | Performed by: INTERNAL MEDICINE

## 2021-04-26 PROCEDURE — G8510 SCR DEP NEG, NO PLAN REQD: HCPCS | Performed by: INTERNAL MEDICINE

## 2021-04-26 PROCEDURE — 82947 ASSAY GLUCOSE BLOOD QUANT: CPT | Performed by: INTERNAL MEDICINE

## 2021-04-26 PROCEDURE — G9899 SCRN MAM PERF RSLTS DOC: HCPCS | Performed by: INTERNAL MEDICINE

## 2021-04-26 PROCEDURE — 3044F HG A1C LEVEL LT 7.0%: CPT | Performed by: INTERNAL MEDICINE

## 2021-04-26 PROCEDURE — 2022F DILAT RTA XM EVC RTNOPTHY: CPT | Performed by: INTERNAL MEDICINE

## 2021-04-26 PROCEDURE — 81003 URINALYSIS AUTO W/O SCOPE: CPT

## 2021-04-26 PROCEDURE — 3017F COLORECTAL CA SCREEN DOC REV: CPT | Performed by: INTERNAL MEDICINE

## 2021-04-26 PROCEDURE — G8754 DIAS BP LESS 90: HCPCS | Performed by: INTERNAL MEDICINE

## 2021-04-26 RX ORDER — METHYLPHENIDATE HYDROCHLORIDE 10 MG/1
10 TABLET ORAL 3 TIMES DAILY
Qty: 90 TAB | Refills: 0 | Status: SHIPPED | OUTPATIENT
Start: 2021-05-25 | End: 2021-11-03 | Stop reason: SDUPTHER

## 2021-04-26 RX ORDER — NITROFURANTOIN 25; 75 MG/1; MG/1
100 CAPSULE ORAL 2 TIMES DAILY
Qty: 14 CAP | Refills: 0 | Status: SHIPPED | OUTPATIENT
Start: 2021-04-26 | End: 2021-05-03

## 2021-04-26 RX ORDER — METHYLPHENIDATE HYDROCHLORIDE 10 MG/1
10 TABLET ORAL
Qty: 90 TAB | Refills: 0 | Status: SHIPPED | OUTPATIENT
Start: 2021-04-26 | End: 2021-04-28

## 2021-04-26 RX ORDER — METHYLPHENIDATE HYDROCHLORIDE 10 MG/1
10 TABLET ORAL 3 TIMES DAILY
Qty: 90 TAB | Refills: 0 | Status: SHIPPED | OUTPATIENT
Start: 2021-06-24 | End: 2022-01-19

## 2021-04-26 RX ORDER — OXYMORPHONE HYDROCHLORIDE 5 MG/1
TABLET ORAL
COMMUNITY
Start: 2021-03-29

## 2021-04-26 NOTE — PROGRESS NOTES
Reviewed record in preparation for visit and have obtained necessary documentation. Noelle Simmons is a 59 y.o.  female presents today for office visit for No chief complaint on file. . Pt is not fasting. Patient is accompanied by self. Pt is in Room # [address] preferred language for health care discussion is english/other. Is the patient using any DME equipment during OV? NO    Advance Directive:  1. Do you have an advance directive in place? Patient Reply: NO    2. If not, would you like material regarding how to put one in place? NO    Coordination of Care:  1. Have you been to the ER, urgent care clinic since your last visit? Hospitalized since your last visit? NO    2. Have you seen or consulted any other health care providers outside of the [address] [place] [address] since your last visit? Include any pap smears or colon screening. YES    Upcoming Appts  Yes Pain Management 5/2021, Neurology 6/2021, Rheumatologist -fely Krauso: No orders of the defined types were placed in this encounter.  Tyrel Astudillo MD/Val Jose LPN    Noelle Simmons is due for:   Health Maintenance Due   Topic    COVID-19 Vaccine (1)    Eye Exam Retinal or Dilated     Breast Cancer Screen Mammogram     PAP AKA CERVICAL CYTOLOGY      Health Maintenance reviewed and discussed per provider  Please order/place referral if appropriate.     Requested Prescriptions      No prescriptions requested or ordered in this encounter       Learning Assessment:  Learning Assessment 1/29/2018 10/25/2017 4/1/2015 2/6/2015 1/27/2014   PRIMARY LEARNER Patient Patient Patient Patient Patient   HIGHEST LEVEL OF EDUCATION - PRIMARY LEARNER  DID NOT GRADUATE HIGH SCHOOL DID NOT GRADUATE HIGH SCHOOL - - -   BARRIERS PRIMARY LEARNER NONE NONE - - -   CO-LEARNER CAREGIVER No No - - -   PRIMARY LANGUAGE ENGLISH ENGLISH ENGLISH ENGLISH ENGLISH   LEARNER PREFERENCE PRIMARY DEMONSTRATION DEMONSTRATION LISTENING LISTENING DEMONSTRATION     - - - READING -   ANSWERED BY self self patient patient patient   RELATIONSHIP SELF SELF SELF SELF SELF     Depression Screening:  3 most recent PHQ Screens 5/27/2020 11/26/2018 10/29/2018 10/15/2018 10/4/2018 8/30/2018 8/2/2018   PHQ Not Done - - - - - - Active Diagnosis of Depression or Bipolar Disorder   Little interest or pleasure in doing things Not at all Not at all Nearly every day Not at all More than half the days Not at all -   Feeling down, depressed, irritable, or hopeless Not at all Not at all Nearly every day Not at all Not at all Not at all -   Total Score PHQ 2 0 0 6 0 2 0 -   Trouble falling or staying asleep, or sleeping too much - - Nearly every day - Nearly every day - -   Feeling tired or having little energy - - Nearly every day - Nearly every day - -   Poor appetite, weight loss, or overeating - - Nearly every day - Nearly every day - -   Feeling bad about yourself - or that you are a failure or have let yourself or your family down - - Not at all - Not at all - -   Trouble concentrating on things such as school, work, reading, or watching TV - - Not at all - Several days - -   Moving or speaking so slowly that other people could have noticed; or the opposite being so fidgety that others notice - - Nearly every day - Not at all - -   Thoughts of being better off dead, or hurting yourself in some way - - Not at all - Not at all - -   PHQ 9 Score - - 18 - 12 - -   How difficult have these problems made it for you to do your work, take care of your home and get along with others - - Extremely difficult - Very difficult - -     Abuse Screening:  Abuse Screening Questionnaire 6/13/2019 1/29/2018 7/24/2017 4/13/2015   Do you ever feel afraid of your partner? N N N N   Are you in a relationship with someone who physically or mentally threatens you? N N N N   Is it safe for you to go home?  Rika May     Fall Risk  Fall Risk Assessment, last 12 mths 4/4/2018 1/29/2018 7/24/2017 1/27/2014   Able to walk? Yes Yes Yes Yes   Fall in past 12 months?  No No No No     Recent Travel Screening and Travel History documentation     Travel Screening      No screening recorded since 04/25/21 0000      Travel History   Travel since 03/26/21     No documented travel since 03/26/21

## 2021-04-26 NOTE — PROGRESS NOTES
HISTORY OF PRESENT ILLNESS  Humaira Barkley is a 59 y.o. female. /78 (BP 1 Location: Right arm, BP Patient Position: Sitting, BP Cuff Size: Adult)   Pulse (!) 117   Resp 18   Ht 5' 1\" (1.549 m)   SpO2 97%   BMI 29.48 kg/m²     Hypertension   The history is provided by the patient. This is a chronic problem. The current episode started more than 1 week ago. Associated symptoms include neck pain. Pertinent negatives include no chest pain, no headaches, no dizziness and no shortness of breath. Diabetes  Pertinent negatives include no chest pain, no headaches and no shortness of breath. Cholesterol Problem  Pertinent negatives include no chest pain, no headaches and no shortness of breath. Review of Systems   Constitutional: Negative for chills and fever. Respiratory: Negative for shortness of breath. Cardiovascular: Negative for chest pain. Genitourinary: Positive for dysuria and urgency. Musculoskeletal: Positive for back pain, joint pain, myalgias and neck pain. Neurological: Negative for dizziness and headaches. Psychiatric/Behavioral: Positive for depression (some days wishes she wouldn't wake up.). Negative for suicidal ideas. The patient is nervous/anxious. Physical Exam  Vitals signs and nursing note reviewed. Constitutional:       Appearance: Normal appearance. She is well-developed. HENT:      Head: Normocephalic and atraumatic. Cardiovascular:      Rate and Rhythm: Normal rate and regular rhythm. Pulmonary:      Effort: Pulmonary effort is normal.      Breath sounds: Normal breath sounds. Musculoskeletal:      Right lower leg: No edema. Left lower leg: No edema. Skin:     General: Skin is warm and dry. Neurological:      General: No focal deficit present. Mental Status: She is alert and oriented to person, place, and time. Motor: Tremor present.    Psychiatric:         Mood and Affect: Mood normal.         Behavior: Behavior normal. ASSESSMENT and PLAN    ICD-10-CM ICD-9-CM    1. Diabetes mellitus without complication (HCC)  O45.8 250.00 AMB POC GLUCOSE, QUANTITATIVE, BLOOD   2. Fibromyalgia  M79.7 729.1 methylphenidate HCl (Ritalin) 10 mg tablet      methylphenidate HCl (RITALIN) 10 mg tablet      methylphenidate HCl (Ritalin) 10 mg tablet   3. Depression, unspecified depression type  F32.9 311 methylphenidate HCl (Ritalin) 10 mg tablet   4. Dysuria  R30.0 788. 1 URINALYSIS W/ RFLX MICROSCOPIC      CULTURE, URINE      nitrofurantoin, macrocrystal-monohydrate, (MACROBID) 100 mg capsule   5. Screening mammogram for high-risk patient  Z12.31 V76.11 MENDEL MAMMO BI SCREENING INCL CAD   6. Medication refill  Z76.0 V68.1 methylphenidate HCl (Ritalin) 10 mg tablet      methylphenidate HCl (RITALIN) 10 mg tablet      methylphenidate HCl (Ritalin) 10 mg tablet   7. Medication monitoring encounter  Z51.81 V58.83 TOXASSURE SELECT 13 (MW)       Too soon for diabetic lab today    Check urine for her c/o dysuria  Start macrobid pending cx    Refill as noted. Discussed COVID vax. Difficult getting to a vaccination site due to her pain issues. Depression and anxiety long standing. Worsened by pain. Has been very unhappy since her pain meds were discontinued And I will not restart them. She had been going to pain management. Requested she f/u with her eye doctor--has not been seen in ? 2 years.     F/u 3 months for next refills

## 2021-04-27 LAB
APPEARANCE UR: CLEAR
BILIRUB UR QL: NEGATIVE
COLOR UR: YELLOW
GLUCOSE UR STRIP.AUTO-MCNC: NEGATIVE MG/DL
HGB UR QL STRIP: NEGATIVE
KETONES UR QL STRIP.AUTO: NEGATIVE MG/DL
LEUKOCYTE ESTERASE UR QL STRIP.AUTO: NEGATIVE
NITRITE UR QL STRIP.AUTO: NEGATIVE
PH UR STRIP: 6.5 [PH]
PROT UR STRIP-MCNC: NEGATIVE MG/DL
SP GR UR REFRACTOMETRY: 1.01 (ref 1–1.04)
UROBILINOGEN UR QL STRIP.AUTO: 0.2 EU/DL

## 2021-04-27 NOTE — ACP (ADVANCE CARE PLANNING)
Due to COVID-19 ACTION PLAN, the patient's office visit was converted to a video visit.    Patient is aware this visit will be treated as a office visit and is billable for insurance purposes if applicable. To accommodate patient care during the COVID-19 pandemic, this visit was performed using Zoom video techonology.     This visit was performed via live interactive two-way video.    Clinician Location: [address]   Patient Location: Home    This patient verbally consents to a video visit and the presence of a scribe.     The patient is aware that a complete physical exam cannot be completed via video.     Patient states they are Jen Chang and that they are speaking to me from their home.     The patient's last in-office visit was on 3/30/2021        PCP: Timoteo Rivers MD    Chief Complaint   Patient presents with   • Video Visit   • Follow-up     still fatigue    • Covid Infection   This note is not being shared electronically with the patient because, the patient requested that this note not be displayed in [facility].      HPI:      COVID-19 infection   was having body aches over the weekend loss of taste and smell is returning, still has fatigue     GERD  Omeprazole 40 mg po qd prn  No side effects   Needs refill     Irritable bowel syndrome  Was having diarrhea stomach felt bloated, felt like food was not digesting, feeling better now   levsin 0.125 mg po under tongue tid  No side effects     Allergic rhinitis  Still have congestion in the morning  Montelukast 10 mg po qpm  xyzal 5 mg po every 12 hrs  No side effects     HEALTH MAINTENANCE  RECOMMENDATION TO PATIENT  COLONOSCOPY- 4/18/2018 EGD 2019  MAMMOGRAM- 3/27/2021  PAP SMEAR- 6/3/2019  TDAP- 6/25/2019  EYE EXAM YEARLY  DENTAL EXAM TWICE A YEAR    Review of Systems  Constitutional symptoms - No fever, no loss of appetite.   Cardiovascular - Negative for chest pain, palpitations, swelling of legs.   Respiratory - No  Patient do not have an advance care plan and is not interested at this time. Patient verbalized understanding. shortness of breath, cough, wheezing.   Gastrointestinal - No nausea or vomiting or abdominal pain or melena or hematochezia or change in bowel habits.  All systems were reviewed and are negative except as in HPI.    Lab Results  Lab Results   Component Value Date    SODIUM 139 04/30/2021    POTASSIUM 4.5 04/30/2021    CHLORIDE 108 (H) 04/30/2021    CO2 27 04/30/2021    BUN 10 04/30/2021    CREATININE 1.02 (H) 04/30/2021    CALCIUM 8.6 04/30/2021    ALBUMIN 3.8 04/30/2021    TP 7.3 05/02/2016    BILIRUBIN 0.5 04/30/2021    ALKPT 69 04/30/2021    GPT 14 04/30/2021    AST 11 04/30/2021    GLUCOSE 81 04/30/2021     Lab Results   Component Value Date    CHOLESTEROL 160 03/20/2018    TRIGLYCERIDE 41 03/20/2018    HDL 53 03/20/2018    CALCLDL 99 03/20/2018     Lab Results   Component Value Date    WBC 4.9 04/30/2021    RBC 5.58 (H) 04/30/2021    HGB 12.6 04/30/2021    HCT 40.6 04/30/2021    MCV 72.8 (L) 04/30/2021    MCH 22.6 (L) 04/30/2021    MCHC 31.0 (L) 04/30/2021     04/30/2021    TLYMPH 34 04/30/2021    PMON 9 04/30/2021    PEOS 3 04/30/2021    PBASO 0 04/30/2021    ANEUT 2.7 04/30/2021    ALYMS 1.7 04/30/2021    RILEY 0.4 04/30/2021    AEOS 0.1 04/30/2021    ABASO 0.0 04/30/2021    TDIF AUTOMATED DIFFERENTIAL 10/12/2020    NRBCRE 0 04/30/2021    PIMGR 0 10/12/2020    AIMGR 0.0 10/12/2020     Lab Results   Component Value Date    HGBA1C 5.5 04/30/2021     Lab Results   Component Value Date    TSH 0.741 04/30/2021     Lab Results   Component Value Date    VITD25 50.4 03/30/2021     Lab Results   Component Value Date    VB12 933 (H) 06/03/2019     Current Medications:   Current Medications    HYDROCORTISONE (ANUCORT-HC) 25 MG SUPPOSITORY    Place 1 suppository rectally 2 times daily as needed for Hemorrhoids.    LEVOCETIRIZINE (XYZAL) 5 MG TABLET    Take 1 tablet by mouth every 12 hours.    LEVONORGESTREL (MIRENA, 52 MG,) (52 MG) 20 MCG/DAY INTRAUTERINE DEVICE    Place 1 device by Intrauterine route.     MONTELUKAST (SINGULAIR) 10 MG TABLET    Take 1 tablet by mouth every evening.    PRENATAL VIT-FE FUMARATE-FA (PRENATAL COMPLETE) 14-0.4 MG TAB    Take 1 tablet by mouth every morning.    SPIRONOLACTONE (ALDACTONE) 100 MG TABLET    Take 100 mg by mouth daily.    TRETINOIN (RETIN-A) 0.01 % GEL    Apply topically nightly.    VITAMIN D, ERGOCALCIFEROL, 01746 UNITS CAPSULE    Take 1 capsule by mouth every 30 days.       Relevant Past Medical History:  History reviewed. No pertinent past medical history.    Social History     Socioeconomic History   • Marital status: Single     Spouse name: Not on file   • Number of children: Not on file   • Years of education: Not on file   • Highest education level: Not on file   Occupational History   • Not on file   Tobacco Use   • Smoking status: Never Smoker   • Smokeless tobacco: Never Used   Substance and Sexual Activity   • Alcohol use: Never     Comment: socially   • Drug use: Not on file   • Sexual activity: Not on file   Other Topics Concern   • Not on file   Social History Narrative   • Not on file     Social Determinants of Health     Financial Resource Strain:    • Social Determinants: Financial Resource Strain:    Food Insecurity:    • Social Determinants: Food Insecurity:    Transportation Needs:    • Lack of Transportation (Medical):    • Lack of Transportation (Non-Medical):    Physical Activity:    • Days of Exercise per Week:    • Minutes of Exercise per Session:    Stress:    • Social Determinants: Stress:    Social Connections:    • Social Determinants: Social Connections:    Intimate Partner Violence:    • Social Determinants: Intimate Partner Violence Past Fear:    • Social Determinants: Intimate Partner Violence Current Fear:        Past Surgical History:   Procedure Laterality Date   •  section, low transverse     • Colonoscopy  2018   • Upper gastrointestinal endoscopy  2019       ALLERGIES:   Allergen Reactions   • Ibuprofen Other (See  Comments)     Unknown        Family History   Problem Relation Age of Onset   • Diabetes Maternal Aunt    • Diabetes Maternal Uncle        Examination:   Last menstrual period 02/09/2021.  There were no vitals filed for this visit.    Physical Exam  Due to limitations of technology unable to do physical exam and an in clinic evaluation discussed with patient and patient consents to this  limitations of technology and unable to do physical exam and in clinic evaluation discussed with patient and patient consent to this  GENERAL APPEARANCE: Well developed, well nourished, alert and cooperative, and appears to be in no acute distress.  HEAD: normocephalic  EYES:  EOMI.  vision is grossly intact,   LUNGS:   No accessory muscle usage,   NEUROLOGICAL  Normal gait.    SKIN: Skin normal color   PSYCHIATRIC: oriented to person, place, and time.     Assessment   Problem List Items Addressed This Visit     Gastro-esophageal reflux disease without esophagitis    Relevant Orders    COMPREHENSIVE METABOLIC PANEL (Completed)    THYROID STIMULATING HORMONE REFLEX (Completed)    CBC WITH DIFFERENTIAL (Completed)    Allergic rhinitis    COVID-19 virus infection - Primary    Irritable bowel syndrome with both constipation and diarrhea    Relevant Medications    hyoscyamine (LEVSIN SL) 0.125 MG sublingual tablet    Other Relevant Orders    COMPREHENSIVE METABOLIC PANEL (Completed)    THYROID STIMULATING HORMONE REFLEX (Completed)    CBC WITH DIFFERENTIAL (Completed)      Other Visit Diagnoses     Screening for diabetes mellitus (DM)        Relevant Orders    GLYCOHEMOGLOBIN (Completed)    Other fatigue        Relevant Orders    THYROID STIMULATING HORMONE REFLEX (Completed)          Plan    COVID-19 virus infection   Due to lingering symptoms patient can return to work on 5/3/2021    Gastro-Esophageal Reflux Disease without esophagitis  Omeprazole 40 mg po qd as directed   No side effects   Routine labs ordered     Irritable bowel  syndrome with both constipation and diarrhea  Refilled levsin 0.125 mg po under tongue tid  No side effects   Routine labs ordered     Screening for diabetes mellitus  Glycohemoglobin ordered     Other fatigue   TSH ordered     Allergic rhinitis, unspecified seasonality, unspecified trigger  Montelukast 10 mg po qpm  xyzal 5 mg po every 12 hrs  No side effects     Return in about 2 months (around 6/27/2021).        Side effects of above medications discussed,   Any referrals issued, advised patient to call his/her insurance to get the list of providers who are in network and covered  all questions answered,   patient agrees with plan,   Advised to call back to get test results.  Risks of delay discussed  Will go to ER if any urgent symptoms    This patient was seen during the novel Coronavirus/COVID-19 pandemic. There were significant changes in work flow, staffing, resource availability, disposition, and practice patterns due to the his pandemic (as recommended by the CDC and [facility]).     Scribe Attestation: On 4/27/2021, Yuko THURMAN scribed the services personally performed by Timoteo Rivers MD.   Provider Attestation: The documentation recorded by the scribe accurately reflects the service I personally performed and the decisions made by Timoteo osuna MD.

## 2021-04-28 DIAGNOSIS — Z76.0 MEDICATION REFILL: ICD-10-CM

## 2021-04-28 DIAGNOSIS — M79.7 FIBROMYALGIA: ICD-10-CM

## 2021-04-28 LAB
BACTERIA SPEC CULT: NORMAL
SERVICE CMNT-IMP: NORMAL

## 2021-04-28 RX ORDER — METHYLPHENIDATE HYDROCHLORIDE 10 MG/1
10 TABLET ORAL 3 TIMES DAILY
Qty: 90 TAB | Refills: 0
Start: 2021-04-28 | End: 2022-01-19 | Stop reason: SDUPTHER

## 2021-04-28 NOTE — PROGRESS NOTES
Spoke with [facility]. Had to correct the order for her ritalin for today. Instructions said prn when should have been scheduled  Order corrected in chart  Also discussed the use as adjunct for fibromyalgia (off label) as well as for her depression.

## 2021-04-30 LAB — DRUGS UR: NORMAL

## 2021-05-03 ENCOUNTER — TELEPHONE (OUTPATIENT)
Dept: INTERNAL MEDICINE CLINIC | Age: 61
End: 2021-05-03

## 2021-05-05 NOTE — TELEPHONE ENCOUNTER
She has chronic widespread pain and wants pain medication.  Unless something has changed she needs to f/u with pain management

## 2021-05-05 NOTE — TELEPHONE ENCOUNTER
Spoke with pt in regards to lab results, two pt identifier's verified. Relayed the 's note. Pt states she is continuing to have Back and  left sided abdominal pain. Also , shooting pain under her legs which throbs. Pt c/o poor urine stream flow. Please advise. Clint Claros

## 2021-05-06 NOTE — TELEPHONE ENCOUNTER
Spoke with pt in regards to back/abdominal/leg pain, two pt identifier's verified. Relayed the 's note and pt states now experience pain after urinating. Pt is advised if urinary symptoms persist, schedule a follow up. Pt acknowledges understanding and voices no concerns at this time.

## 2021-05-25 DIAGNOSIS — Z76.0 MEDICATION REFILL: ICD-10-CM

## 2021-05-25 RX ORDER — SIMVASTATIN 40 MG/1
TABLET, FILM COATED ORAL
Qty: 90 TABLET | Refills: 4 | Status: SHIPPED | OUTPATIENT
Start: 2021-05-25 | End: 2022-05-03 | Stop reason: SDUPTHER

## 2021-07-08 DIAGNOSIS — Z76.0 MEDICATION REFILL: ICD-10-CM

## 2021-07-08 RX ORDER — LORAZEPAM 2 MG/1
2 TABLET ORAL 2 TIMES DAILY
Qty: 180 TABLET | Refills: 1 | Status: SHIPPED | OUTPATIENT
Start: 2021-07-08 | End: 2022-01-19 | Stop reason: SDUPTHER

## 2021-07-08 NOTE — TELEPHONE ENCOUNTER
Pharmacy fax request for  A new 90 day refill. Requested Prescriptions     Pending Prescriptions Disp Refills    LORazepam (ATIVAN) 2 mg tablet 180 Tablet 1     Sig: Take 1 Tablet by mouth two (2) times a day. Max Daily Amount: 4 mg.  Indications: anxious

## 2021-08-20 DIAGNOSIS — M46.1 SACROILIITIS, NOT ELSEWHERE CLASSIFIED (HCC): ICD-10-CM

## 2021-08-20 DIAGNOSIS — M79.7 FIBROMYALGIA: ICD-10-CM

## 2021-08-20 DIAGNOSIS — Z76.0 MEDICATION REFILL: ICD-10-CM

## 2021-08-20 DIAGNOSIS — M47.817 LUMBOSACRAL SPONDYLOSIS WITHOUT MYELOPATHY: ICD-10-CM

## 2021-08-20 RX ORDER — GABAPENTIN 300 MG/1
300 CAPSULE ORAL 3 TIMES DAILY
Qty: 270 CAPSULE | Refills: 0 | Status: SHIPPED | OUTPATIENT
Start: 2021-08-20 | End: 2022-01-19

## 2021-08-20 NOTE — TELEPHONE ENCOUNTER
Pharmacy fax request for refill. Last OV 4/26/21, no future appt scheduled. Requested Prescriptions     Pending Prescriptions Disp Refills    gabapentin (NEURONTIN) 300 mg capsule 270 Capsule 0     Sig: Take 1 Capsule by mouth three (3) times daily. Max Daily Amount: 900 mg.

## 2021-10-06 ENCOUNTER — OFFICE VISIT (OUTPATIENT)
Dept: INTERNAL MEDICINE CLINIC | Age: 61
End: 2021-10-06
Payer: MEDICARE

## 2021-10-06 VITALS
OXYGEN SATURATION: 98 % | SYSTOLIC BLOOD PRESSURE: 151 MMHG | WEIGHT: 163.8 LBS | RESPIRATION RATE: 18 BRPM | TEMPERATURE: 97 F | DIASTOLIC BLOOD PRESSURE: 83 MMHG | BODY MASS INDEX: 30.93 KG/M2 | HEART RATE: 87 BPM | HEIGHT: 61 IN

## 2021-10-06 DIAGNOSIS — R30.0 DYSURIA: Primary | ICD-10-CM

## 2021-10-06 DIAGNOSIS — M79.7 FIBROMYALGIA: ICD-10-CM

## 2021-10-06 DIAGNOSIS — N18.31 STAGE 3A CHRONIC KIDNEY DISEASE (HCC): ICD-10-CM

## 2021-10-06 DIAGNOSIS — R52 GENERALIZED PAIN: ICD-10-CM

## 2021-10-06 DIAGNOSIS — E11.9 TYPE 2 DIABETES MELLITUS WITHOUT COMPLICATION, WITHOUT LONG-TERM CURRENT USE OF INSULIN (HCC): ICD-10-CM

## 2021-10-06 LAB
BILIRUB UR QL STRIP: NEGATIVE
GLUCOSE DOSE-GTT, POCT, GLDSPOCT: 146
GLUCOSE UR-MCNC: NEGATIVE MG/DL
HBA1C MFR BLD HPLC: 6.8 %
KETONES P FAST UR STRIP-MCNC: NEGATIVE MG/DL
PH UR STRIP: 7 [PH] (ref 4.6–8)
PROT UR QL STRIP: NEGATIVE
SP GR UR STRIP: 1.02 (ref 1–1.03)
UA UROBILINOGEN AMB POC: NORMAL (ref 0.2–1)
URINALYSIS CLARITY POC: CLEAR
URINALYSIS COLOR POC: YELLOW
URINE BLOOD POC: NEGATIVE
URINE LEUKOCYTES POC: NEGATIVE
URINE NITRITES POC: NEGATIVE

## 2021-10-06 PROCEDURE — 83036 HEMOGLOBIN GLYCOSYLATED A1C: CPT | Performed by: INTERNAL MEDICINE

## 2021-10-06 PROCEDURE — 81003 URINALYSIS AUTO W/O SCOPE: CPT | Performed by: INTERNAL MEDICINE

## 2021-10-06 PROCEDURE — G8417 CALC BMI ABV UP PARAM F/U: HCPCS | Performed by: INTERNAL MEDICINE

## 2021-10-06 PROCEDURE — 99215 OFFICE O/P EST HI 40 MIN: CPT | Performed by: INTERNAL MEDICINE

## 2021-10-06 PROCEDURE — 82950 GLUCOSE TEST: CPT | Performed by: INTERNAL MEDICINE

## 2021-10-06 PROCEDURE — 3017F COLORECTAL CA SCREEN DOC REV: CPT | Performed by: INTERNAL MEDICINE

## 2021-10-06 PROCEDURE — G8754 DIAS BP LESS 90: HCPCS | Performed by: INTERNAL MEDICINE

## 2021-10-06 PROCEDURE — G8427 DOCREV CUR MEDS BY ELIG CLIN: HCPCS | Performed by: INTERNAL MEDICINE

## 2021-10-06 PROCEDURE — 2022F DILAT RTA XM EVC RTNOPTHY: CPT | Performed by: INTERNAL MEDICINE

## 2021-10-06 PROCEDURE — G8753 SYS BP > OR = 140: HCPCS | Performed by: INTERNAL MEDICINE

## 2021-10-06 PROCEDURE — 3044F HG A1C LEVEL LT 7.0%: CPT | Performed by: INTERNAL MEDICINE

## 2021-10-06 PROCEDURE — G8432 DEP SCR NOT DOC, RNG: HCPCS | Performed by: INTERNAL MEDICINE

## 2021-10-06 PROCEDURE — G9899 SCRN MAM PERF RSLTS DOC: HCPCS | Performed by: INTERNAL MEDICINE

## 2021-10-06 NOTE — PROGRESS NOTES
Sofía Webster is a 59 y.o. female (: 1960) presenting to address:    Chief Complaint   Patient presents with    Urinary Frequency    Dysuria       Vitals:    10/06/21 1508   BP: (!) 151/83   Pulse: 87   Resp: 18   Temp: 97 °F (36.1 °C)   TempSrc: Temporal   SpO2: 98%   Weight: 163 lb 12.8 oz (74.3 kg)   Height: 5' 1\" (1.549 m)   PainSc:   7   PainLoc: Generalized       Hearing/Vision:   No exam data present    Learning Assessment:     Learning Assessment 2018   PRIMARY LEARNER Patient   HIGHEST LEVEL OF EDUCATION - PRIMARY LEARNER  DID NOT GRADUATE HIGH SCHOOL   BARRIERS PRIMARY LEARNER NONE   CO-LEARNER CAREGIVER No   PRIMARY LANGUAGE ENGLISH   LEARNER PREFERENCE PRIMARY DEMONSTRATION     -   ANSWERED BY self   RELATIONSHIP SELF     Depression Screening:     3 most recent PHQ Screens 10/6/2021   PHQ Not Done -   Little interest or pleasure in doing things Not at all   Feeling down, depressed, irritable, or hopeless Not at all   Total Score PHQ 2 0   Trouble falling or staying asleep, or sleeping too much -   Feeling tired or having little energy -   Poor appetite, weight loss, or overeating -   Feeling bad about yourself - or that you are a failure or have let yourself or your family down -   Trouble concentrating on things such as school, work, reading, or watching TV -   Moving or speaking so slowly that other people could have noticed; or the opposite being so fidgety that others notice -   Thoughts of being better off dead, or hurting yourself in some way -   PHQ 9 Score -   How difficult have these problems made it for you to do your work, take care of your home and get along with others -     Fall Risk Assessment:     Fall Risk Assessment, last 12 mths 2018   Able to walk? Yes   Fall in past 12 months? No     Abuse Screening:     Abuse Screening Questionnaire 2019   Do you ever feel afraid of your partner?  N   Are you in a relationship with someone who physically or mentally threatens you? N   Is it safe for you to go home? Y     Coordination of Care Questionaire:   1. Have you been to the ER, urgent care clinic since your last visit? Hospitalized since your last visit? NO    2. Have you seen or consulted any other health care providers outside of the [address] [place] [address] since your last visit? Include any pap smears or colon screening. yes    Advanced Directive:   1. Do you have an Advanced Directive? NO    2. Would you like information on Advanced Directives?  NO

## 2021-10-06 NOTE — PROGRESS NOTES
Progress Note    Patient: Sharri Carpio               Sex: female                  YOB: 1960      Age:  59 y.o.                    HPI:     Sharri Carpio is a 59 y.o. female who has been seen for  dysuria and frequency . Kg Tolentino She has pain under her shoulder blades x  one month . Sitting on toilet hurts even more. She has some leakage  of urine . She has a history of sciatica. She has some chills and sweating episodes. She complains of some irritation of her labia.     Past Medical History:   Diagnosis Date    ELINOR positive     2020    Anxiety     Cataract 6/19/15    bilat    Cervical nerve root impingement 10/14/2020    C5-6, by EMG    Chronic sialoadenitis     Degenerative disc disease     Depression     Diabetes ([address]     Dry eye     Dyslipidemia     Fatigue     uses ritalin for this    Fibromyalgia     GERD (gastroesophageal reflux disease)     Headache(784.0)     daily, all her life    Hypertension     OCD (obsessive compulsive disorder)     PTSD (post-traumatic stress disorder)     Seizure disorder ([address]     Thyroid dysfunction     TMJ (temporomandibular joint disorder)     right    Viral encephalitis     unknown type from mosquito       Past Surgical History:   Procedure Laterality Date    HX  SECTION      X 2    HX COLONOSCOPY  2012    ? f/u    HX PARTIAL HYSTERECTOMY         Family History   Problem Relation Age of Onset    Alcohol abuse Father     Diabetes Sister     Migraines Daughter     Diabetes Maternal Aunt     Cancer Maternal Aunt     Diabetes Maternal Uncle     Diabetes Paternal Aunt     Diabetes Paternal Uncle     Heart Attack Maternal Grandmother        Social History     Socioeconomic History    Marital status:      Spouse name: Not on file    Number of children: Not on file    Years of education: Not on file    Highest education level: Not on file   Occupational History    Occupation: retired, , ,    Tobacco Use    Smoking status: Never Smoker    Smokeless tobacco: Never Used   Vaping Use    Vaping Use: Never used   Substance and Sexual Activity    Alcohol use: No    Drug use: No    Sexual activity: Not Currently     Social Determinants of Health     Financial Resource Strain:     Difficulty of Paying Living Expenses:    Food Insecurity:     Worried About Running Out of Food in the Last Year:     Ran Out of Food in the Last Year:    Transportation Needs:     Lack of Transportation (Medical):  Lack of Transportation (Non-Medical):    Physical Activity:     Days of Exercise per Week:     Minutes of Exercise per Session:    Stress:     Feeling of Stress :    Social Connections:     Frequency of Communication with Friends and Family:     Frequency of Social Gatherings with Friends and Family:     Attends Evangelical Services:     Active Member of Clubs or Organizations:     Attends Club or Organization Meetings:     Marital Status:          Current Outpatient Medications:     gabapentin (NEURONTIN) 300 mg capsule, Take 1 Capsule by mouth three (3) times daily. Max Daily Amount: 900 mg., Disp: 270 Capsule, Rfl: 0    LORazepam (ATIVAN) 2 mg tablet, Take 1 Tablet by mouth two (2) times a day. Max Daily Amount: 4 mg. Indications: anxious, Disp: 180 Tablet, Rfl: 1    simvastatin (ZOCOR) 40 mg tablet, TAKE 1 TABLET BY MOUTH EVERY NIGHT AT BEDTIME  FOR HIGH CHOLESTEROL, Disp: 90 Tablet, Rfl: 4    methylphenidate HCl (Ritalin) 10 mg tablet, Take 1 Tab by mouth three (3) times daily. Max Daily Amount: 30 mg. Indications: ADJUNCT FOR FIBROMYALGIA, Disp: 90 Tab, Rfl: 0    oxyMORphone (OPANA) 5 mg tablet, TAKE 1 TABLET BY MOUTH THREE TIMES DAILY AS NEEDED, Disp: , Rfl:     methylphenidate HCl (RITALIN) 10 mg tablet, Take 1 Tab by mouth three (3) times daily. Max Daily Amount: 30 mg.  Indications: ADJUNCT TO FIBROMYALGIA, Disp: 90 Tab, Rfl: 0    methylphenidate HCl (Ritalin) 10 mg tablet, Take 1 Tab by mouth three (3) times daily. Max Daily Amount: 30 mg. ADJUNCT FOR DEPRESSION/FIBROMYALGIA, Disp: 90 Tab, Rfl: 0    alcohol swabs (Alcohol Pads) padm, Use as directed twice a day or as directed, Disp: 200 Pad, Rfl: 5    Blood Glucose Control High&Low (Accu-Chek Chelsey Control Soln) soln, Use to test meter with each new set of test strips, Disp: 1 Bottle, Rfl: 5    metFORMIN ER (GLUCOPHAGE XR) 750 mg tablet, Take 1 Tab by mouth daily. , Disp: 90 Tab, Rfl: 4    DULoxetine (CYMBALTA) 60 mg capsule, Take 1 Cap by mouth daily. , Disp: 90 Cap, Rfl: 4    cyclobenzaprine (FLEXERIL) 10 mg tablet, TAKE 1 TABLET BY MOUTH TWICE DAILY  Indications: disorder characterized by stiff, tender & painful muscles, Disp: 180 Tab, Rfl: 5    atenoloL (TENORMIN) 25 mg tablet, Take 1 Tab by mouth daily. , Disp: 90 Tab, Rfl: 3    topiramate (TOPAMAX) 200 mg tablet, TAKE 1 TABLET BY MOUTH TWICE DAILY, Disp: 180 Tab, Rfl: 3    promethazine (PHENERGAN) 25 mg tablet, Take 1 Tab by mouth every six (6) hours as needed for Nausea. Indications: nausea, Disp: 60 Tab, Rfl: 5    lidocaine (LIDODERM) 5 %, by TransDERmal route every twenty-four (24) hours. Apply patch to the affected area for 12 hours a day and remove for 12 hours a day., Disp: , Rfl:     menthol (MINERAL ICE EX), by Apply Externally route two (2) times daily as needed. Patient states she alternates with Volteren gel, Disp: , Rfl:     NEXIUM 40 mg capsule, TK 1 C PO QD AT 3PM, Disp: , Rfl: 11     Allergies   Allergen Reactions    Zanaflex [Tizanidine] Other (comments)     Nausea and worsening headache       Review of Systems   Constitutional: Positive for chills, diaphoresis, malaise/fatigue and weight loss. Negative for fever. 4 lbs weight loss   Respiratory: Negative for cough and wheezing. Cardiovascular: Negative for chest pain. Gastrointestinal: Positive for diarrhea and nausea. Negative for constipation and vomiting. Genitourinary: Positive for dysuria, flank pain and frequency. Negative for hematuria and urgency. Neurological: Positive for dizziness. Negative for loss of consciousness. Physical Exam:      Visit Vitals  BP (!) 151/83 (BP 1 Location: Right upper arm, BP Patient Position: Sitting, BP Cuff Size: Adult)   Pulse 87   Temp 97 °F (36.1 °C) (Temporal)   Resp 18   Ht 5' 1\" (1.549 m)   Wt 163 lb 12.8 oz (74.3 kg)   SpO2 98%   BMI 30.95 kg/m²       Physical Exam  Constitutional:       Appearance: Normal appearance. Cardiovascular:      Rate and Rhythm: Normal rate and regular rhythm. Pulmonary:      Effort: Pulmonary effort is normal. No respiratory distress. Breath sounds: Normal breath sounds. No stridor. No wheezing or rhonchi. Abdominal:      General: There is no distension. Palpations: Abdomen is soft. There is no mass. Tenderness: There is abdominal tenderness. There is right CVA tenderness and left CVA tenderness. There is no guarding or rebound. Hernia: No hernia is present. Neurological:      General: No focal deficit present. Mental Status: She is alert and oriented to person, place, and time. Psychiatric:      Comments: She moves very slowly and any movement seems to make pain worse. Pain just lying back on examining table           Labs Reviewed:      Assessment/Plan       ICD-10-CM ICD-9-CM    1. Dysuria  R30.0 788.1 AMB POC URINALYSIS DIP STICK AUTO W/O MICRO      AMB POC URINALYSIS DIP STICK AUTO W/ MICRO      METABOLIC PANEL, COMPREHENSIVE   2. Type 2 diabetes mellitus without complication, without long-term current use of insulin ([facility])  E11.9 250.00 AMB POC HEMOGLOBIN A1C      AMB POC GLUCOSE TEST   3. Stage 3a chronic kidney disease ([facility])  T85.09 864.1 METABOLIC PANEL, COMPREHENSIVE      CBC WITH AUTOMATED DIFF   4. Generalized pain  P28 084.74 METABOLIC PANEL, COMPREHENSIVE      CBC WITH AUTOMATED DIFF   5.  Fibromyalgia  M79.7 729.1    POC urine unremarkable Will send in GYN lotrimin for possible candida infection /vagina.  Sent after labs were reviewed       Samantha Sandra MD

## 2021-10-07 LAB
ALBUMIN SERPL-MCNC: 4.5 G/DL (ref 3.8–4.8)
ALBUMIN/GLOB SERPL: 1.9 {RATIO} (ref 1.2–2.2)
ALP SERPL-CCNC: 61 IU/L (ref 44–121)
ALT SERPL-CCNC: 8 IU/L (ref 0–32)
AST SERPL-CCNC: 14 IU/L (ref 0–40)
BASOPHILS # BLD AUTO: 0 X10E3/UL (ref 0–0.2)
BASOPHILS NFR BLD AUTO: 0 %
BILIRUB SERPL-MCNC: <0.2 MG/DL (ref 0–1.2)
BUN SERPL-MCNC: 9 MG/DL (ref 8–27)
BUN/CREAT SERPL: 7 (ref 12–28)
CALCIUM SERPL-MCNC: 9 MG/DL (ref 8.7–10.3)
CHLORIDE SERPL-SCNC: 109 MMOL/L (ref 96–106)
CO2 SERPL-SCNC: 19 MMOL/L (ref 20–29)
CREAT SERPL-MCNC: 1.26 MG/DL (ref 0.57–1)
EOSINOPHIL # BLD AUTO: 0 X10E3/UL (ref 0–0.4)
EOSINOPHIL NFR BLD AUTO: 0 %
ERYTHROCYTE [DISTWIDTH] IN BLOOD BY AUTOMATED COUNT: 13.6 % (ref 11.7–15.4)
GLOBULIN SER CALC-MCNC: 2.4 G/DL (ref 1.5–4.5)
GLUCOSE SERPL-MCNC: ABNORMAL MG/DL
HCT VFR BLD AUTO: 39 % (ref 34–46.6)
HGB BLD-MCNC: 13.1 G/DL (ref 11.1–15.9)
IMM GRANULOCYTES # BLD AUTO: 0 X10E3/UL (ref 0–0.1)
IMM GRANULOCYTES NFR BLD AUTO: 0 %
LYMPHOCYTES # BLD AUTO: 2.8 X10E3/UL (ref 0.7–3.1)
LYMPHOCYTES NFR BLD AUTO: 29 %
MCH RBC QN AUTO: 30 PG (ref 26.6–33)
MCHC RBC AUTO-ENTMCNC: 33.6 G/DL (ref 31.5–35.7)
MCV RBC AUTO: 89 FL (ref 79–97)
MONOCYTES # BLD AUTO: 0.5 X10E3/UL (ref 0.1–0.9)
MONOCYTES NFR BLD AUTO: 5 %
NEUTROPHILS # BLD AUTO: 6.6 X10E3/UL (ref 1.4–7)
NEUTROPHILS NFR BLD AUTO: 66 %
PLATELET # BLD AUTO: 295 X10E3/UL (ref 150–450)
POTASSIUM SERPL-SCNC: ABNORMAL MMOL/L
PROT SERPL-MCNC: 6.9 G/DL (ref 6–8.5)
RBC # BLD AUTO: 4.37 X10E6/UL (ref 3.77–5.28)
SODIUM SERPL-SCNC: 141 MMOL/L (ref 134–144)
WBC # BLD AUTO: 9.9 X10E3/UL (ref 3.4–10.8)

## 2021-11-03 DIAGNOSIS — Z76.0 MEDICATION REFILL: ICD-10-CM

## 2021-11-03 DIAGNOSIS — M79.7 FIBROMYALGIA: ICD-10-CM

## 2021-11-03 RX ORDER — METHYLPHENIDATE HYDROCHLORIDE 10 MG/1
10 TABLET ORAL 3 TIMES DAILY
Qty: 90 TABLET | Refills: 0 | Status: SHIPPED | OUTPATIENT
Start: 2021-11-03 | End: 2022-01-19 | Stop reason: SDUPTHER

## 2021-11-03 NOTE — TELEPHONE ENCOUNTER
Patient request for refill. Last OV 10/6/21, next scheduled 11/17/21. Requested Prescriptions     Pending Prescriptions Disp Refills    methylphenidate HCl (RITALIN) 10 mg tablet 90 Tablet 0     Sig: Take 1 Tablet by mouth three (3) times daily. Max Daily Amount: 30 mg.  Indications: ADJUNCT TO FIBROMYALGIA

## 2021-11-24 DIAGNOSIS — Z76.0 MEDICATION REFILL: ICD-10-CM

## 2021-11-26 RX ORDER — ATENOLOL 25 MG/1
TABLET ORAL
Qty: 90 TABLET | Refills: 3 | Status: SHIPPED | OUTPATIENT
Start: 2021-11-26 | End: 2022-08-15 | Stop reason: SDUPTHER

## 2021-12-08 ENCOUNTER — NURSE TRIAGE (OUTPATIENT)
Dept: OTHER | Facility: CLINIC | Age: 61
End: 2021-12-08

## 2021-12-08 NOTE — TELEPHONE ENCOUNTER
Received call from [employer] at [facility], caller not on line. Complaint: flu vaccine reaction    [place]: [place]    Practice Name: [facility] telephone number verified as [phone]    Connected with caller via phone, please see below triage    Reason for Disposition   Patient wants to be seen    Answer Assessment - Initial Assessment Questions  1. SYMPTOMS: \"What is the main symptom? \" (e.g., redness, swelling, pain)       Arm warm,  Chilling, sweating, skin cold, headache, nausea    2. ONSET: \"When was the vaccine (shot) given? \" \"How much later did the Monday  Around 2:30PM   begin? \" (e.g., hours, days ago)       I didn't feel real good yesterday, I have lupus and fibromyalgia,  But this morning my head was hurting real bad. 3. SEVERITY: \"How bad is it? \"       Caller in tears,  Feels like the top of head is going to blow off,    4. FEVER: \"Is there a fever? \" If so, ask: \"What is it, how was it measured, and when did it start? \"       Chills and sweating, unknown fever    5. IMMUNIZATIONS GIVEN: \"What shots have you recently received? \"      Flu vaccine    6. PAST REACTIONS: \"Have you reacted to immunizations before? \" If so, ask: \"What happened? \"      Pneumonia- it made me really sick. 7. OTHER SYMPTOMS: \"Do you have any other symptoms? \"      Chills, skin feel cold to touch,, but I'm sweating and wet with sweat. Injection site is still warm to touch (it was hot to touch last night), nausea, headache. Body aches. Protocols used: IMMUNIZATION REACTIONS-ADULT-[place]    Received call from [employer] [facility] with Red Flag Complaint. Brief description of triage: flu vaccine reaction, was calling to reschedule appt. Triage indicates for patient to will call back to reschedule when feeling better. Didn't have a way to get to MD office. Suggested going to  [facility].     Care advice provided, patient verbalizes understanding; denies any other questions or concerns; instructed to call back for any new or worsening symptoms. Attention Provider: Thank you for allowing me to participate in the care of your patient. The patient was connected to triage in response to information provided to the E[facility]. Please do not respond through this encounter as the response is not directed to a shared pool.

## 2021-12-14 ENCOUNTER — TELEPHONE (OUTPATIENT)
Dept: MAMMOGRAPHY | Age: 61
End: 2021-12-14

## 2021-12-14 NOTE — TELEPHONE ENCOUNTER
I called  Ana Luna  To assist  this her  in scheduling a Mammogram . I left a message for this patient to return my call  at [phone].     Kevin Henley LPN   Panel Manager

## 2022-01-19 ENCOUNTER — OFFICE VISIT (OUTPATIENT)
Dept: INTERNAL MEDICINE CLINIC | Age: 62
End: 2022-01-19
Payer: MEDICARE

## 2022-01-19 VITALS
DIASTOLIC BLOOD PRESSURE: 77 MMHG | WEIGHT: 156 LBS | HEIGHT: 61 IN | RESPIRATION RATE: 18 BRPM | HEART RATE: 92 BPM | OXYGEN SATURATION: 96 % | SYSTOLIC BLOOD PRESSURE: 113 MMHG | TEMPERATURE: 97.2 F | BODY MASS INDEX: 29.45 KG/M2

## 2022-01-19 DIAGNOSIS — E11.21 TYPE 2 DIABETES WITH NEPHROPATHY (HCC): Primary | ICD-10-CM

## 2022-01-19 DIAGNOSIS — M46.1 SACROILIITIS, NOT ELSEWHERE CLASSIFIED (HCC): ICD-10-CM

## 2022-01-19 DIAGNOSIS — E78.5 DYSLIPIDEMIA: ICD-10-CM

## 2022-01-19 DIAGNOSIS — R52 GENERALIZED PAIN: ICD-10-CM

## 2022-01-19 DIAGNOSIS — I10 ESSENTIAL HYPERTENSION: ICD-10-CM

## 2022-01-19 DIAGNOSIS — M79.7 FIBROMYALGIA: ICD-10-CM

## 2022-01-19 DIAGNOSIS — Z12.11 SCREEN FOR COLON CANCER: ICD-10-CM

## 2022-01-19 DIAGNOSIS — R23.2 HOT FLASHES: ICD-10-CM

## 2022-01-19 DIAGNOSIS — N18.31 STAGE 3A CHRONIC KIDNEY DISEASE (HCC): ICD-10-CM

## 2022-01-19 DIAGNOSIS — F32.A DEPRESSION, UNSPECIFIED DEPRESSION TYPE: ICD-10-CM

## 2022-01-19 DIAGNOSIS — Z76.0 MEDICATION REFILL: ICD-10-CM

## 2022-01-19 PROCEDURE — G8752 SYS BP LESS 140: HCPCS | Performed by: INTERNAL MEDICINE

## 2022-01-19 PROCEDURE — G9899 SCRN MAM PERF RSLTS DOC: HCPCS | Performed by: INTERNAL MEDICINE

## 2022-01-19 PROCEDURE — G8427 DOCREV CUR MEDS BY ELIG CLIN: HCPCS | Performed by: INTERNAL MEDICINE

## 2022-01-19 PROCEDURE — 2022F DILAT RTA XM EVC RTNOPTHY: CPT | Performed by: INTERNAL MEDICINE

## 2022-01-19 PROCEDURE — G8754 DIAS BP LESS 90: HCPCS | Performed by: INTERNAL MEDICINE

## 2022-01-19 PROCEDURE — 3046F HEMOGLOBIN A1C LEVEL >9.0%: CPT | Performed by: INTERNAL MEDICINE

## 2022-01-19 PROCEDURE — G9717 DOC PT DX DEP/BP F/U NT REQ: HCPCS | Performed by: INTERNAL MEDICINE

## 2022-01-19 PROCEDURE — 3017F COLORECTAL CA SCREEN DOC REV: CPT | Performed by: INTERNAL MEDICINE

## 2022-01-19 PROCEDURE — G8417 CALC BMI ABV UP PARAM F/U: HCPCS | Performed by: INTERNAL MEDICINE

## 2022-01-19 PROCEDURE — 99214 OFFICE O/P EST MOD 30 MIN: CPT | Performed by: INTERNAL MEDICINE

## 2022-01-19 RX ORDER — LORAZEPAM 2 MG/1
2 TABLET ORAL 2 TIMES DAILY
Qty: 180 TABLET | Refills: 1 | Status: SHIPPED | OUTPATIENT
Start: 2022-01-19 | End: 2022-07-18

## 2022-01-19 RX ORDER — METHOCARBAMOL 750 MG/1
TABLET, FILM COATED ORAL
COMMUNITY
Start: 2021-12-28

## 2022-01-19 RX ORDER — METHYLPHENIDATE HYDROCHLORIDE 10 MG/1
10 TABLET ORAL 3 TIMES DAILY
Qty: 90 TABLET | Refills: 0 | Status: SHIPPED | OUTPATIENT
Start: 2022-02-18 | End: 2022-05-03 | Stop reason: SDUPTHER

## 2022-01-19 RX ORDER — PREGABALIN 50 MG/1
50 CAPSULE ORAL 2 TIMES DAILY
COMMUNITY
Start: 2022-01-13

## 2022-01-19 RX ORDER — BLOOD SUGAR DIAGNOSTIC
STRIP MISCELLANEOUS
Qty: 100 STRIP | Refills: 5 | Status: SHIPPED | OUTPATIENT
Start: 2022-01-19 | End: 2022-05-11 | Stop reason: CLARIF

## 2022-01-19 RX ORDER — LANCETS
EACH MISCELLANEOUS
Qty: 100 EACH | Refills: 5 | Status: SHIPPED | OUTPATIENT
Start: 2022-01-19

## 2022-01-19 RX ORDER — METHYLPHENIDATE HYDROCHLORIDE 10 MG/1
10 TABLET ORAL 3 TIMES DAILY
Qty: 90 TABLET | Refills: 0 | Status: SHIPPED | OUTPATIENT
Start: 2022-01-19 | End: 2022-05-03 | Stop reason: SDUPTHER

## 2022-01-19 RX ORDER — INSULIN PUMP SYRINGE, 3 ML
EACH MISCELLANEOUS
Qty: 1 KIT | Refills: 1 | Status: SHIPPED | OUTPATIENT
Start: 2022-01-19 | End: 2022-05-11 | Stop reason: CLARIF

## 2022-01-19 RX ORDER — METHYLPHENIDATE HYDROCHLORIDE 10 MG/1
10 TABLET ORAL 3 TIMES DAILY
Qty: 90 TABLET | Refills: 0 | Status: SHIPPED | OUTPATIENT
Start: 2022-03-16 | End: 2022-05-03 | Stop reason: SDUPTHER

## 2022-01-19 RX ORDER — PROMETHAZINE HYDROCHLORIDE 25 MG/1
25 TABLET ORAL
Qty: 60 TABLET | Refills: 5 | Status: SHIPPED | OUTPATIENT
Start: 2022-01-19 | End: 2022-04-08 | Stop reason: SDUPTHER

## 2022-01-19 NOTE — PROGRESS NOTES
HISTORY OF PRESENT ILLNESS  Afsaneh Womack is a 59 y.o. female. /77 (BP 1 Location: Left upper arm, BP Patient Position: Sitting, BP Cuff Size: Adult)   Pulse 92   Temp 97.2 °F (36.2 °C) (Temporal)   Resp 18   Ht 5' 1\" (1.549 m)   Wt 156 lb (70.8 kg)   SpO2 96%   BMI 29.48 kg/m²     Has one tough ultra diabetic meter. Checks blood sugar once a day    Diabetes  The history is provided by the patient. This is a chronic problem. The current episode started more than 1 week ago. The problem occurs daily. The problem has not changed since onset. Pertinent negatives include no chest pain and no shortness of breath. Hypertension   The history is provided by the patient. This is a chronic problem. The current episode started more than 1 week ago. The problem has not changed since onset. Associated symptoms include neck pain. Pertinent negatives include no chest pain and no shortness of breath. Risk factors include dyslipidemia. Review of Systems   Constitutional: Negative for chills and fever. Respiratory: Negative for shortness of breath. Cardiovascular: Negative for chest pain. Musculoskeletal: Positive for back pain, joint pain, myalgias and neck pain. Physical Exam  Vitals and nursing note reviewed. Constitutional:       Appearance: Normal appearance. She is well-developed. HENT:      Head: Normocephalic and atraumatic. Cardiovascular:      Rate and Rhythm: Normal rate and regular rhythm. Pulmonary:      Effort: Pulmonary effort is normal.      Breath sounds: Normal breath sounds. Musculoskeletal:      Right lower leg: No edema. Left lower leg: No edema. Skin:     General: Skin is warm and dry. Neurological:      General: No focal deficit present. Mental Status: She is alert and oriented to person, place, and time.       Comments: Diabetic foot exam:     Left Foot:   Visual Exam: normal    Pulse DP: 2+ (normal)   Filament test: normal sensation    Vibratory sensation: normal      Right Foot:   Visual Exam: normal    Pulse DP: 2+ (normal)   Filament test: normal sensation    Vibratory sensation: normal     Psychiatric:         Mood and Affect: Mood normal.         Behavior: Behavior normal.         ASSESSMENT and PLAN    ICD-10-CM ICD-9-CM    1. Type 2 diabetes with nephropathy (HCC)  A62.95 182.22 METABOLIC PANEL, COMPREHENSIVE     583.81 LIPID PANEL      URINALYSIS W/ RFLX MICROSCOPIC      MICROALBUMIN, UR, RAND W/ MICROALB/CREAT RATIO      HEMOGLOBIN A1C W/O EAG      HM DIABETES FOOT EXAM      Blood-Glucose Meter (OneTouch Ultra2 Meter) monitoring kit      lancets (OneTouch UltraSoft Lancets) misc      glucose blood VI test strips (OneTouch Ultra Test) strip   2. Dyslipidemia  E78.5 272.4 LIPID PANEL   3. Essential hypertension  E42 320.2 METABOLIC PANEL, COMPREHENSIVE      LIPID PANEL      URINALYSIS W/ RFLX MICROSCOPIC   4. Generalized pain  R52 780.96    5. Depression, unspecified depression type  F32. A 311 methylphenidate HCl (Ritalin) 10 mg tablet   6. Fibromyalgia  M79.7 729.1 methylphenidate HCl (Ritalin) 10 mg tablet      methylphenidate HCl (Ritalin) 10 mg tablet      methylphenidate HCl (RITALIN) 10 mg tablet   7. Hot flashes  R23.2 782.62    8. Medication refill  Z76.0 V68.1 LORazepam (ATIVAN) 2 mg tablet      methylphenidate HCl (Ritalin) 10 mg tablet      methylphenidate HCl (Ritalin) 10 mg tablet      methylphenidate HCl (RITALIN) 10 mg tablet      promethazine (PHENERGAN) 25 mg tablet   9.  Screen for colon cancer  Z12.11 V76.51 OCCULT BLOOD IMMUNOASSAY,DIAGNOSTIC         Update lab    BP controlled    DM has been controlled    Refills as noted    F/u 3 months for MWV, HTN, DM, Med refills

## 2022-01-19 NOTE — PROGRESS NOTES
1. \"Have you been to the ER, urgent care clinic since your last visit? Hospitalized since your last visit? \" No    2. \"Have you seen or consulted any other health care providers outside of the [address] [place] [address] since your last visit? \" Yes Pain Management and Rheumatology    3. For patients aged 39-70: Has the patient had a colonoscopy / FIT/ Cologuard? Yes - no Care Gap present      If the patient is female:    4. For patients aged 41-77: Has the patient had a mammogram within the past 2 years? Yes - no Care Gap present  See top three    5. For patients aged 21-65: Has the patient had a pap smear?  No

## 2022-01-20 LAB
ALBUMIN SERPL-MCNC: 4.2 G/DL (ref 3.8–4.8)
ALBUMIN/CREAT UR: 17 MG/G CREAT (ref 0–29)
ALBUMIN/GLOB SERPL: 1.6 {RATIO} (ref 1.2–2.2)
ALP SERPL-CCNC: 61 IU/L (ref 44–121)
ALT SERPL-CCNC: 7 IU/L (ref 0–32)
APPEARANCE UR: CLEAR
AST SERPL-CCNC: 10 IU/L (ref 0–40)
BACTERIA #/AREA URNS HPF: ABNORMAL /[HPF]
BILIRUB SERPL-MCNC: 0.2 MG/DL (ref 0–1.2)
BILIRUB UR QL STRIP: NEGATIVE
BUN SERPL-MCNC: 14 MG/DL (ref 8–27)
BUN/CREAT SERPL: 10 (ref 12–28)
CALCIUM SERPL-MCNC: 9.1 MG/DL (ref 8.7–10.3)
CASTS URNS QL MICRO: ABNORMAL /LPF
CHLORIDE SERPL-SCNC: 109 MMOL/L (ref 96–106)
CHOLEST SERPL-MCNC: 141 MG/DL (ref 100–199)
CO2 SERPL-SCNC: 21 MMOL/L (ref 20–29)
COLOR UR: YELLOW
CREAT SERPL-MCNC: 1.37 MG/DL (ref 0.57–1)
CREAT UR-MCNC: 282.4 MG/DL
CRYSTALS URNS MICRO: ABNORMAL
EPI CELLS #/AREA URNS HPF: ABNORMAL /HPF (ref 0–10)
GLOBULIN SER CALC-MCNC: 2.7 G/DL (ref 1.5–4.5)
GLUCOSE SERPL-MCNC: 117 MG/DL (ref 65–99)
GLUCOSE UR QL STRIP: NEGATIVE
HBA1C MFR BLD: 6.3 % (ref 4.8–5.6)
HDLC SERPL-MCNC: 38 MG/DL
HGB UR QL STRIP: NEGATIVE
IMP & REVIEW OF LAB RESULTS: NORMAL
KETONES UR QL STRIP: ABNORMAL
LDLC SERPL CALC-MCNC: 77 MG/DL (ref 0–99)
LEUKOCYTE ESTERASE UR QL STRIP: ABNORMAL
MICRO URNS: ABNORMAL
MICROALBUMIN UR-MCNC: 46.6 UG/ML
NITRITE UR QL STRIP: NEGATIVE
PH UR STRIP: 5 [PH] (ref 5–7.5)
POTASSIUM SERPL-SCNC: 4.4 MMOL/L (ref 3.5–5.2)
PROT SERPL-MCNC: 6.9 G/DL (ref 6–8.5)
PROT UR QL STRIP: ABNORMAL
RBC #/AREA URNS HPF: ABNORMAL /HPF (ref 0–2)
SODIUM SERPL-SCNC: 144 MMOL/L (ref 134–144)
SP GR UR STRIP: >=1.03 (ref 1–1.03)
TRIGL SERPL-MCNC: 148 MG/DL (ref 0–149)
UNIDENT CRYS URNS QL MICRO: PRESENT
UROBILINOGEN UR STRIP-MCNC: 1 MG/DL (ref 0.2–1)
VLDLC SERPL CALC-MCNC: 26 MG/DL (ref 5–40)
WBC #/AREA URNS HPF: ABNORMAL /HPF (ref 0–5)

## 2022-02-08 DIAGNOSIS — M47.817 LUMBOSACRAL SPONDYLOSIS WITHOUT MYELOPATHY: ICD-10-CM

## 2022-02-08 DIAGNOSIS — M46.1 SACROILIITIS, NOT ELSEWHERE CLASSIFIED (HCC): ICD-10-CM

## 2022-02-08 DIAGNOSIS — M79.7 FIBROMYALGIA: ICD-10-CM

## 2022-02-08 DIAGNOSIS — Z76.0 MEDICATION REFILL: ICD-10-CM

## 2022-02-08 RX ORDER — DULOXETIN HYDROCHLORIDE 60 MG/1
60 CAPSULE, DELAYED RELEASE ORAL DAILY
Qty: 90 CAPSULE | Refills: 4 | Status: SHIPPED | OUTPATIENT
Start: 2022-02-08 | End: 2022-02-10

## 2022-02-08 NOTE — TELEPHONE ENCOUNTER
Spoke with pt in regards to refill request. Pt advised Michael states the test strips and meter are ready for . Will send request for Cymbalta to the PCP. Pt acknowledges understanding and voices no concerns at this time.

## 2022-02-08 NOTE — TELEPHONE ENCOUNTER
----- Message from [facility] sent at 2/8/2022  9:55 AM EST -----  Subject: Refill Request    QUESTIONS  Name of Medication? Blood-Glucose Meter (OneTouch Ultra2 Meter) monitoring   kit  Patient-reported dosage and instructions? Hutch Ultra  How many days do you have left? 0  Preferred Pharmacy? [id number] #[id number]  Pharmacy phone number (if available)? [phone]  Additional Information for Provider? Patient advised the pharmacy is   telling her her PCP needs to resend the order over to her. She hasnt been   able to check her glucose due to this.  ---------------------------------------------------------------------------  --------------,  Name of Medication? DULoxetine (CYMBALTA) 60 mg capsule  Patient-reported dosage and instructions? 60MG   How many days do you have left? 0  Preferred Pharmacy? [id number] #[id number]  Pharmacy phone number (if available)? [phone]  Additional Information for Provider? Patient is completely out.  ---------------------------------------------------------------------------  --------------  CALL BACK INFO  What is the best way for the office to contact you? OK to leave message on   voicemail  Preferred Call Back Phone Number?  [phone]

## 2022-02-09 DIAGNOSIS — M79.7 FIBROMYALGIA: ICD-10-CM

## 2022-02-09 DIAGNOSIS — M47.817 LUMBOSACRAL SPONDYLOSIS WITHOUT MYELOPATHY: ICD-10-CM

## 2022-02-09 DIAGNOSIS — M46.1 SACROILIITIS, NOT ELSEWHERE CLASSIFIED (HCC): ICD-10-CM

## 2022-02-09 DIAGNOSIS — Z76.0 MEDICATION REFILL: ICD-10-CM

## 2022-02-10 RX ORDER — DULOXETIN HYDROCHLORIDE 60 MG/1
CAPSULE, DELAYED RELEASE ORAL
Qty: 90 CAPSULE | Refills: 4 | Status: SHIPPED | OUTPATIENT
Start: 2022-02-10

## 2022-02-10 RX ORDER — METFORMIN HYDROCHLORIDE 750 MG/1
TABLET, EXTENDED RELEASE ORAL
Qty: 90 TABLET | Refills: 4 | Status: SHIPPED | OUTPATIENT
Start: 2022-02-10

## 2022-02-19 DIAGNOSIS — M79.7 FIBROMYALGIA: ICD-10-CM

## 2022-02-19 DIAGNOSIS — Z76.0 MEDICATION REFILL: ICD-10-CM

## 2022-02-19 DIAGNOSIS — M47.817 LUMBOSACRAL SPONDYLOSIS WITHOUT MYELOPATHY: ICD-10-CM

## 2022-02-19 DIAGNOSIS — M46.1 SACROILIITIS, NOT ELSEWHERE CLASSIFIED (HCC): ICD-10-CM

## 2022-02-20 RX ORDER — TOPIRAMATE 200 MG/1
TABLET ORAL
Qty: 180 TABLET | Refills: 3 | Status: SHIPPED | OUTPATIENT
Start: 2022-02-20

## 2022-03-18 PROBLEM — M54.81 BILATERAL OCCIPITAL NEURALGIA: Status: ACTIVE | Noted: 2018-06-26

## 2022-03-18 PROBLEM — R29.3 ABNORMAL POSTURE: Status: ACTIVE | Noted: 2018-06-26

## 2022-03-19 PROBLEM — M43.16 SPONDYLOLISTHESIS OF LUMBAR REGION: Status: ACTIVE | Noted: 2018-08-22

## 2022-03-19 PROBLEM — M70.62 TROCHANTERIC BURSITIS OF BOTH HIPS: Status: ACTIVE | Noted: 2018-06-26

## 2022-03-19 PROBLEM — M70.61 TROCHANTERIC BURSITIS OF BOTH HIPS: Status: ACTIVE | Noted: 2018-06-26

## 2022-03-19 PROBLEM — Z79.899 ENCOUNTER FOR LONG-TERM (CURRENT) USE OF HIGH-RISK MEDICATION: Status: ACTIVE | Noted: 2018-06-26

## 2022-03-19 PROBLEM — M54.31 BILATERAL SCIATICA: Status: ACTIVE | Noted: 2018-08-22

## 2022-03-19 PROBLEM — M54.32 BILATERAL SCIATICA: Status: ACTIVE | Noted: 2018-08-22

## 2022-03-19 PROBLEM — R29.6 FREQUENT FALLS: Status: ACTIVE | Noted: 2019-03-28

## 2022-03-19 PROBLEM — G43.919 INTRACTABLE MIGRAINE WITHOUT STATUS MIGRAINOSUS: Status: ACTIVE | Noted: 2018-06-26

## 2022-03-20 PROBLEM — E11.21 TYPE 2 DIABETES WITH NEPHROPATHY (HCC): Status: ACTIVE | Noted: 2018-07-09

## 2022-03-20 PROBLEM — M79.601 RIGHT ARM PAIN: Status: ACTIVE | Noted: 2019-03-28

## 2022-03-20 PROBLEM — G89.4 CHRONIC PAIN SYNDROME: Status: ACTIVE | Noted: 2018-06-26

## 2022-03-20 PROBLEM — M15.9 GENERALIZED OA: Status: ACTIVE | Noted: 2017-04-04

## 2022-04-08 DIAGNOSIS — Z76.0 MEDICATION REFILL: ICD-10-CM

## 2022-04-08 RX ORDER — PROMETHAZINE HYDROCHLORIDE 25 MG/1
25 TABLET ORAL
Qty: 60 TABLET | Refills: 5 | Status: SHIPPED | OUTPATIENT
Start: 2022-04-08 | End: 2022-08-17 | Stop reason: SDUPTHER

## 2022-04-08 NOTE — TELEPHONE ENCOUNTER
Pharmacy fax request for a new 90 day Rx. Last OV 1/19/22, next scheduled 4/19/22. Requested Prescriptions     Pending Prescriptions Disp Refills    promethazine (PHENERGAN) 25 mg tablet 60 Tablet 5     Sig: Take 1 Tablet by mouth every six (6) hours as needed for Nausea.  Indications: nausea

## 2022-05-03 ENCOUNTER — OFFICE VISIT (OUTPATIENT)
Dept: INTERNAL MEDICINE CLINIC | Age: 62
End: 2022-05-03
Payer: MEDICARE

## 2022-05-03 VITALS
HEART RATE: 95 BPM | BODY MASS INDEX: 30.61 KG/M2 | RESPIRATION RATE: 18 BRPM | TEMPERATURE: 97.7 F | HEIGHT: 61 IN | OXYGEN SATURATION: 96 % | SYSTOLIC BLOOD PRESSURE: 108 MMHG | DIASTOLIC BLOOD PRESSURE: 73 MMHG | WEIGHT: 162.13 LBS

## 2022-05-03 DIAGNOSIS — M26.69 TMJ DERANGEMENT: ICD-10-CM

## 2022-05-03 DIAGNOSIS — Z76.0 MEDICATION REFILL: ICD-10-CM

## 2022-05-03 DIAGNOSIS — I10 ESSENTIAL HYPERTENSION: ICD-10-CM

## 2022-05-03 DIAGNOSIS — F32.A DEPRESSION, UNSPECIFIED DEPRESSION TYPE: ICD-10-CM

## 2022-05-03 DIAGNOSIS — E78.5 DYSLIPIDEMIA: ICD-10-CM

## 2022-05-03 DIAGNOSIS — Z12.11 SCREEN FOR COLON CANCER: ICD-10-CM

## 2022-05-03 DIAGNOSIS — Z00.00 MEDICARE ANNUAL WELLNESS VISIT, SUBSEQUENT: Primary | ICD-10-CM

## 2022-05-03 DIAGNOSIS — E11.21 TYPE 2 DIABETES WITH NEPHROPATHY (HCC): ICD-10-CM

## 2022-05-03 DIAGNOSIS — R53.83 FATIGUE, UNSPECIFIED TYPE: ICD-10-CM

## 2022-05-03 DIAGNOSIS — M79.7 FIBROMYALGIA: ICD-10-CM

## 2022-05-03 DIAGNOSIS — Z12.31 SCREENING MAMMOGRAM FOR HIGH-RISK PATIENT: ICD-10-CM

## 2022-05-03 DIAGNOSIS — Z51.81 MEDICATION MONITORING ENCOUNTER: ICD-10-CM

## 2022-05-03 PROCEDURE — G8754 DIAS BP LESS 90: HCPCS | Performed by: INTERNAL MEDICINE

## 2022-05-03 PROCEDURE — G9899 SCRN MAM PERF RSLTS DOC: HCPCS | Performed by: INTERNAL MEDICINE

## 2022-05-03 PROCEDURE — G8752 SYS BP LESS 140: HCPCS | Performed by: INTERNAL MEDICINE

## 2022-05-03 PROCEDURE — G9717 DOC PT DX DEP/BP F/U NT REQ: HCPCS | Performed by: INTERNAL MEDICINE

## 2022-05-03 PROCEDURE — G8417 CALC BMI ABV UP PARAM F/U: HCPCS | Performed by: INTERNAL MEDICINE

## 2022-05-03 PROCEDURE — G8427 DOCREV CUR MEDS BY ELIG CLIN: HCPCS | Performed by: INTERNAL MEDICINE

## 2022-05-03 PROCEDURE — 99214 OFFICE O/P EST MOD 30 MIN: CPT | Performed by: INTERNAL MEDICINE

## 2022-05-03 PROCEDURE — 3044F HG A1C LEVEL LT 7.0%: CPT | Performed by: INTERNAL MEDICINE

## 2022-05-03 PROCEDURE — 3017F COLORECTAL CA SCREEN DOC REV: CPT | Performed by: INTERNAL MEDICINE

## 2022-05-03 PROCEDURE — 2022F DILAT RTA XM EVC RTNOPTHY: CPT | Performed by: INTERNAL MEDICINE

## 2022-05-03 PROCEDURE — G0439 PPPS, SUBSEQ VISIT: HCPCS | Performed by: INTERNAL MEDICINE

## 2022-05-03 RX ORDER — METHYLPHENIDATE HYDROCHLORIDE 10 MG/1
10 TABLET ORAL 3 TIMES DAILY
Qty: 90 TABLET | Refills: 0 | Status: SHIPPED | OUTPATIENT
Start: 2022-05-03

## 2022-05-03 RX ORDER — SIMVASTATIN 40 MG/1
TABLET, FILM COATED ORAL
Qty: 90 TABLET | Refills: 4 | Status: SHIPPED | OUTPATIENT
Start: 2022-05-03 | End: 2022-05-30

## 2022-05-03 RX ORDER — METHYLPHENIDATE HYDROCHLORIDE 10 MG/1
10 TABLET ORAL 3 TIMES DAILY
Qty: 90 TABLET | Refills: 0 | Status: SHIPPED | OUTPATIENT
Start: 2022-05-31

## 2022-05-03 RX ORDER — METHYLPHENIDATE HYDROCHLORIDE 10 MG/1
10 TABLET ORAL 3 TIMES DAILY
Qty: 90 TABLET | Refills: 0 | Status: SHIPPED | OUTPATIENT
Start: 2022-06-28

## 2022-05-03 NOTE — PROGRESS NOTES
This is the Subsequent Medicare Annual Wellness Exam, performed 12 months or more after the Initial AWV or the last Subsequent AWV    I have reviewed the patient's medical history in detail and updated the computerized patient record. Assessment/Plan   Education and counseling provided:  Are appropriate based on today's review and evaluation    1. Medicare annual wellness visit, subsequent    Depression Risk Factor Screening     3 most recent PHQ Screens 5/3/2022   PHQ Not Done -   Little interest or pleasure in doing things Not at all   Feeling down, depressed, irritable, or hopeless Not at all   Total Score PHQ 2 0   Trouble falling or staying asleep, or sleeping too much -   Feeling tired or having little energy -   Poor appetite, weight loss, or overeating -   Feeling bad about yourself - or that you are a failure or have let yourself or your family down -   Trouble concentrating on things such as school, work, reading, or watching TV -   Moving or speaking so slowly that other people could have noticed; or the opposite being so fidgety that others notice -   Thoughts of being better off dead, or hurting yourself in some way -   PHQ 9 Score -   How difficult have these problems made it for you to do your work, take care of your home and get along with others -       Alcohol & Drug Abuse Risk Screen    Do you average more than 1 drink per night or more than 7 drinks a week:  No    On any one occasion in the past three months have you have had more than 3 drinks containing alcohol:  No          Functional Ability and Level of Safety    Hearing: Hearing is good. Activities of Daily Living: The home contains:    lives in a senior apt which is handicap accessible. Has bars and a pull cord  Patient needs help with:  transportation, shopping and walking      Ambulation: with difficulty, can't walk further than 0.5 blocks         Fall Risk:  Fall Risk Assessment, last 12 mths 5/3/2022   Able to walk?  Yes Fall in past 12 months? 0   Do you feel unsteady? 1   Are you worried about falling 1   Is TUG test greater than 12 seconds? 1   Is the gait abnormal? 1   Number of falls in past 12 months 0      Abuse Screen:  Patient is not abused       Cognitive Screening    Has your family/caregiver stated any concerns about your memory: yes - states her sister comments on it. Cognitive Screening: Abnormal - Mini Cog Test--only 2/3 remembered. Only named 8 animals.  But with prompting, named several more    Health Maintenance Due     Health Maintenance Due   Topic Date Due    Eye Exam Retinal or Dilated  06/19/2017    Breast Cancer Screen Mammogram  01/17/2019    COVID-19 Vaccine (3 - Booster for Pfizer series) 11/28/2021    Colorectal Cancer Screening Combo  12/07/2021       Patient Care Team   Patient Care Team:  Jose Ramon Medrano MD as PCP - General (Internal Medicine)  Jose Ramon Medrano MD as PCP - [facility] Empaneled Provider  Ceci Williamson MD as Physician (Neurology)  Romario Barber MD (Pain Management)  Les Ivey MD as Consulting Provider (Otolaryngology)  Scoper, Royal Matt MD as Physician (Ophthalmology)  Jahaira Sahu OD as Consulting Provider (Optometry)  Briana Hylton MD (Neurology)    History     Patient Active Problem List   Diagnosis Code    Degeneration of lumbar or lumbosacral intervertebral disc M51.37    Lumbosacral radiculopathy M54.17    Lumbosacral spondylosis without myelopathy M47.817    Dyslipidemia E78.5    Enthesopathy of hip region M76.899    Essential hypertension I10    Chronic pain G89.29    Fibromyalgia M79.7    H/O viral encephalitis Z86.61    Chronic sialoadenitis K11.23    Type 2 diabetes mellitus without complication, without long-term current use of insulin ([address] E11.9    Generalized OA M15.9    Encounter for long-term (current) use of high-risk medication Z79.899    Intractable migraine without status migrainosus G43.919    Bilateral occipital neuralgia M54.81    Abnormal posture R29.3    Chronic pain syndrome G89.4    Trochanteric bursitis of both hips M70.61, M70.62    Type 2 diabetes with nephropathy (HCC) E11.21    Spondylolisthesis of lumbar region M43.16    Bilateral sciatica M54.31, M54.32    Right arm pain M79.601    Frequent falls R29.6    Other headache syndrome G44.89    Depression F32. A     Past Medical History:   Diagnosis Date    ELINOR positive     2020    Anxiety     Cataract 6/19/15    bilat    Cervical nerve root impingement 10/14/2020    C5-6, by EMG    Chronic sialoadenitis     Degenerative disc disease     Depression     Diabetes ([facility]     Dry eye     Dyslipidemia     Fatigue     uses ritalin for this    Fibromyalgia     GERD (gastroesophageal reflux disease)     Headache(784.0)     daily, all her life    Hypertension     OCD (obsessive compulsive disorder)     PTSD (post-traumatic stress disorder)     Seizure disorder ([facility]     Thyroid dysfunction     TMJ (temporomandibular joint disorder)     right    Viral encephalitis     unknown type from mosquito      Past Surgical History:   Procedure Laterality Date    HX  SECTION      X 2    HX COLONOSCOPY      ? f/u    HX PARTIAL HYSTERECTOMY       Current Outpatient Medications   Medication Sig Dispense Refill    promethazine (PHENERGAN) 25 mg tablet Take 1 Tablet by mouth every six (6) hours as needed for Nausea. Indications: nausea 60 Tablet 5    topiramate (TOPAMAX) 200 mg tablet TAKE 1 TABLET BY MOUTH TWICE DAILY 180 Tablet 3    metFORMIN ER (GLUCOPHAGE XR) 750 mg tablet TAKE 1 TABLET EVERY DAY 90 Tablet 4    DULoxetine (CYMBALTA) 60 mg capsule TAKE 1 CAPSULE EVERY DAY 90 Capsule 4    methocarbamoL (ROBAXIN) 750 mg tablet TAKE 1 TABLET BY MOUTH TWICE DAILY AS NEEDED      pregabalin (LYRICA) 50 mg capsule Take 50 mg by mouth two (2) times a day.       LORazepam (ATIVAN) 2 mg tablet Take 1 Tablet by mouth two (2) times a day. Max Daily Amount: 4 mg. Indications: anxious 180 Tablet 1    methylphenidate HCl (Ritalin) 10 mg tablet Take 1 Tablet by mouth three (3) times daily. Max Daily Amount: 30 mg. Indications: ADJUNCT FOR FIBROMYALGIA 90 Tablet 0    methylphenidate HCl (Ritalin) 10 mg tablet Take 1 Tablet by mouth three (3) times daily. Max Daily Amount: 30 mg. ADJUNCT FOR DEPRESSION/FIBROMYALGIA 90 Tablet 0    methylphenidate HCl (RITALIN) 10 mg tablet Take 1 Tablet by mouth three (3) times daily. Max Daily Amount: 30 mg. Indications: ADJUNCT TO FIBROMYALGIA 90 Tablet 0    Blood-Glucose Meter (OneTouch Ultra2 Meter) monitoring kit Use to test blood sugar once daily 1 Kit 1    lancets (OneTouch UltraSoft Lancets) misc Use to test blood sugar once daily 100 Each 5    glucose blood VI test strips (OneTouch Ultra Test) strip Use to test blood sugar once daily 100 Strip 5    atenoloL (TENORMIN) 25 mg tablet TAKE 1 TABLET EVERY DAY 90 Tablet 3    clotrimazole (GYNE-LOTRIMIN) 2 % vaginal cream Insert 1 Applicatorful into vagina nightly. 21 g 0    simvastatin (ZOCOR) 40 mg tablet TAKE 1 TABLET BY MOUTH EVERY NIGHT AT BEDTIME  FOR HIGH CHOLESTEROL 90 Tablet 4    oxyMORphone (OPANA) 5 mg tablet TAKE 1 TABLET BY MOUTH THREE TIMES DAILY AS NEEDED      alcohol swabs (Alcohol Pads) padm Use as directed twice a day or as directed 200 Pad 5    Blood Glucose Control High&Low (Accu-Chek Chelsey Control Soln) soln Use to test meter with each new set of test strips 1 Bottle 5    lidocaine (LIDODERM) 5 % by TransDERmal route every twenty-four (24) hours. Apply patch to the affected area for 12 hours a day and remove for 12 hours a day.       NEXIUM 40 mg capsule TK 1 C PO QD AT 3PM  11     Allergies   Allergen Reactions    Zanaflex [Tizanidine] Other (comments)     Nausea and worsening headache       Family History   Problem Relation Age of Onset    Alcohol abuse Father     Diabetes Sister     Migraines Daughter     Diabetes Maternal Aunt     Cancer Maternal Aunt     Diabetes Maternal Uncle     Diabetes Paternal Aunt     Diabetes Paternal Uncle     Heart Attack Maternal Grandmother      Social History     Tobacco Use    Smoking status: Never Smoker    Smokeless tobacco: Never Used   Substance Use Topics    Alcohol use: No         Triston Soriano MD

## 2022-05-03 NOTE — PATIENT INSTRUCTIONS
Medicare Wellness Visit, Female     The best way to live healthy is to have a lifestyle where you eat a well-balanced diet, exercise regularly, limit alcohol use, and quit all forms of tobacco/nicotine, if applicable. Regular preventive services are another way to keep healthy. Preventive services (vaccines, screening tests, monitoring & exams) can help personalize your care plan, which helps you manage your own care. Screening tests can find health problems at the earliest stages, when they are easiest to treat. [facility] follows the current, evidence-based guidelines published by the [facility] ([facility]) when recommending preventive services for our patients. Because we follow these guidelines, sometimes recommendations change over time as research supports it. (For example, mammograms used to be recommended annually. Even though Medicare will still pay for an annual mammogram, the newer guidelines recommend a mammogram every two years for women of average risk). Of course, you and your doctor may decide to screen more often for some diseases, based on your risk and your co-morbidities (chronic disease you are already diagnosed with). Preventive services for you include:  - Medicare offers their members a free annual wellness visit, which is time for you and your primary care provider to discuss and plan for your preventive service needs. Take advantage of this benefit every year!  -All adults over the age of 72 should receive the recommended pneumonia vaccines. Current USPSTF guidelines recommend a series of two vaccines for the best pneumonia protection.   -All adults should have a flu vaccine yearly and a tetanus vaccine every 10 years.   -All adults age 48 and older should receive the shingles vaccines (series of two vaccines).       -All adults age 38-68 who are overweight should have a diabetes screening test once every three years.   -All adults born between [birth year] and 1965 should be screened once for Hepatitis C.  -Other screening tests and preventive services for persons with diabetes include: an eye exam to screen for diabetic retinopathy, a kidney function test, a foot exam, and stricter control over your cholesterol.   -Cardiovascular screening for adults with routine risk involves an electrocardiogram (ECG) at intervals determined by your doctor.   -Colorectal cancer screenings should be done for adults age 54-65 with no increased risk factors for colorectal cancer. There are a number of acceptable methods of screening for this type of cancer. Each test has its own benefits and drawbacks. Discuss with your doctor what is most appropriate for you during your annual wellness visit. The different tests include: colonoscopy (considered the best screening method), a fecal occult blood test, a fecal DNA test, and sigmoidoscopy.    -A bone mass density test is recommended when a woman turns 65 to screen for osteoporosis. This test is only recommended one time, as a screening. Some providers will use this same test as a disease monitoring tool if you already have osteoporosis. -Breast cancer screenings are recommended every other year for women of normal risk, age 54-69.  -Cervical cancer screenings for women over age 72 are only recommended with certain risk factors.      Here is a list of your current Health Maintenance items (your personalized list of preventive services) with a due date:  Health Maintenance Due   Topic Date Due    Eye Exam  06/19/2017    Mammogram  01/17/2019    COVID-19 Vaccine (3 - Booster for Ziegler Peter series) 11/28/2021    Colorectal Screening  12/07/2021

## 2022-05-03 NOTE — PROGRESS NOTES
Reviewed record in preparation for visit and have obtained necessary documentation. Giovanny Horton is a 57 y.o.  female presents today for office visit for   Chief Complaint   Patient presents with    Annual Wellness Visit    Hypertension    Diabetes    Cholesterol Problem   . Pt is not fasting. Patient is accompanied by self. I have received verbal consent from Giovanny Horton to discuss any/all medical information while they are present in the room. Pt is in Room # [address] preferred language for health care discussion is english/other. Is the patient using any DME equipment during OV? NO    Advance Directive:  1. Do you have an advance directive in place? Patient Reply: NO    2. If not, would you like material regarding how to put one in place? NO      1. \"Have you been to the ER, urgent care clinic since your last visit? Hospitalized since your last visit? \" No    2. \"Have you seen or consulted any other health care providers outside of the [address] [place] [address] since your last visit? \" Yes Pain Management and Rheumatology 2022     3. For patients aged 39-70: Has the patient had a colonoscopy? No     If the patient is female:    4. For patients aged 41-77: Has the patient had a mammogram within the past 2 years? No    5. For patients aged 21-65: Has the patient had a pap smear? No    Upcoming Appts  No    VORB: No orders of the defined types were placed in this encounter.  Markie Gar MD/[place] LPN    Giovanny Horton is due for:   Health Maintenance Due   Topic    Eye Exam Retinal or Dilated     Breast Cancer Screen Mammogram     Medicare Yearly Exam     COVID-19 Vaccine (3 - Booster for Pfizer series)   [address] [facility] [address] Maintenance reviewed and discussed per provider  Please order/place referral if appropriate.     Requested Prescriptions      No prescriptions requested or ordered in this encounter       Learning Assessment:  Learning Assessment 1/29/2018 10/25/2017 4/1/2015 2/6/2015 1/27/2014   PRIMARY LEARNER Patient Patient Patient Patient Patient   HIGHEST LEVEL OF EDUCATION - PRIMARY LEARNER  DID NOT GRADUATE HIGH SCHOOL DID NOT GRADUATE HIGH SCHOOL - - -   BARRIERS PRIMARY LEARNER NONE NONE - - -   CO-LEARNER CAREGIVER No No - - -   PRIMARY LANGUAGE ENGLISH ENGLISH ENGLISH ENGLISH ENGLISH   LEARNER PREFERENCE PRIMARY DEMONSTRATION DEMONSTRATION LISTENING LISTENING DEMONSTRATION     - - - READING -   ANSWERED BY self self patient patient patient   RELATIONSHIP SELF SELF SELF SELF SELF     Depression Screening:  3 most recent PHQ Screens 1/19/2022 10/6/2021 4/26/2021 5/27/2020 11/26/2018 10/29/2018 10/15/2018   PHQ Not Done - - - - - - -   Little interest or pleasure in doing things Not at all Not at all Not at all Not at all Not at all Nearly every day Not at all   Feeling down, depressed, irritable, or hopeless Not at all Not at all Not at all Not at all Not at all Nearly every day Not at all   Total Score PHQ 2 0 0 0 0 0 6 0   Trouble falling or staying asleep, or sleeping too much - - - - - Nearly every day -   Feeling tired or having little energy - - - - - Nearly every day -   Poor appetite, weight loss, or overeating - - - - - Nearly every day -   Feeling bad about yourself - or that you are a failure or have let yourself or your family down - - - - - Not at all -   Trouble concentrating on things such as school, work, reading, or watching TV - - - - - Not at all -   Moving or speaking so slowly that other people could have noticed; or the opposite being so fidgety that others notice - - - - - Nearly every day -   Thoughts of being better off dead, or hurting yourself in some way - - - - - Not at all -   PHQ 9 Score - - - - - 18 -   How difficult have these problems made it for you to do your work, take care of your home and get along with others - - - - - Extremely difficult -     Abuse Screening:  Abuse Screening Questionnaire 1/19/2022 6/13/2019 1/29/2018 7/24/2017 4/13/2015   Do you ever feel afraid of your partner? N N N N N   Are you in a relationship with someone who physically or mentally threatens you? N N N N N   Is it safe for you to go home? Merline CORBETT     Fall Risk  Fall Risk Assessment, last 12 mths 1/19/2022 4/4/2018 1/29/2018 7/24/2017 1/27/2014   Able to walk? Yes Yes Yes Yes Yes   Fall in past 12 months? 0 No No No No   Do you feel unsteady? 1 - - - -   Are you worried about falling 0 - - - -   Is TUG test greater than 12 seconds? 0 - - - -   Is the gait abnormal? 1 - - - -   Number of falls in past 12 months 0 - - - -     Recent Travel Screening and Travel History documentation     Travel Screening     Question   Response    In the last 10 days, have you been in contact with someone who was confirmed or suspected to have Coronavirus/COVID-19? No / Unsure    Have you had a COVID-19 viral test in the last 10 days? No    Do you have any of the following new or worsening symptoms? None of these    Have you traveled internationally or domestically in the last month?   No      Travel History   Travel since 04/03/22    No documented travel since 04/03/22

## 2022-05-03 NOTE — PROGRESS NOTES
HISTORY OF PRESENT ILLNESS  Oneil Acuna is a 57 y.o. female. /73 (BP 1 Location: Left upper arm, BP Patient Position: Sitting, BP Cuff Size: Adult)   Pulse 95   Temp 97.7 °F (36.5 °C) (Temporal)   Resp 18   Ht 5' 1\" (1.549 m)   Wt 162 lb 2 oz (73.5 kg)   SpO2 96%   BMI 30.63 kg/m²     Hypertension   The history is provided by the patient. This is a chronic problem. The current episode started more than 1 week ago. Associated symptoms include malaise/fatigue and neck pain. Risk factors include dyslipidemia and diabetes mellitus. Diabetes  The history is provided by the patient. This is a chronic problem. The current episode started more than 1 week ago. Review of Systems   Constitutional: Positive for malaise/fatigue. Negative for chills and fever. Musculoskeletal: Positive for back pain, joint pain (mari right shoulder), myalgias and neck pain. Psychiatric/Behavioral: Positive for depression. The patient is nervous/anxious and has insomnia. Physical Exam  Vitals and nursing note reviewed. Constitutional:       Appearance: Normal appearance. She is well-developed. HENT:      Head: Normocephalic and atraumatic. Right Ear: Tympanic membrane normal.      Left Ear: Tympanic membrane normal.      Ears:      Comments: Right TMJ pain on palpation. Cardiovascular:      Rate and Rhythm: Normal rate and regular rhythm. Pulmonary:      Effort: Pulmonary effort is normal.      Breath sounds: Normal breath sounds. Musculoskeletal:      Right lower leg: No edema. Left lower leg: No edema. Skin:     General: Skin is warm and dry. Neurological:      General: No focal deficit present. Mental Status: She is alert and oriented to person, place, and time.    Psychiatric:         Mood and Affect: Mood normal.         Behavior: Behavior normal.         ASSESSMENT and PLAN    ICD-10-CM ICD-9-CM                  3. Type 2 diabetes with nephropathy (HCC)  E11.21 250.40 METABOLIC PANEL, COMPREHENSIVE     583.81 LIPID PANEL      HEMOGLOBIN A1C W/O EAG   4. Essential hypertension  K69 723.8 METABOLIC PANEL, COMPREHENSIVE   5. Dyslipidemia  E78.5 272.4 LIPID PANEL   6. Fibromyalgia  M79.7 729.1 methylphenidate HCl (Ritalin) 10 mg tablet      methylphenidate HCl (Ritalin) 10 mg tablet      methylphenidate HCl (RITALIN) 10 mg tablet      TSH AND FREE T4   7. Fatigue, unspecified type  H64.28 314.47 METABOLIC PANEL, COMPREHENSIVE      TSH AND FREE T4   8. Depression, unspecified depression type  F32. A 311 methylphenidate HCl (Ritalin) 10 mg tablet      methylphenidate HCl (Ritalin) 10 mg tablet      methylphenidate HCl (RITALIN) 10 mg tablet      TSH AND FREE T4   9. TMJ derangement  M26.69 524.69    10. Screening mammogram for high-risk patient  Z12.31 V76.11 MENDEL MAMMO BI SCREENING INCL CAD   6. Medication refill  Z76.0 V68.1 simvastatin (ZOCOR) 40 mg tablet      methylphenidate HCl (Ritalin) 10 mg tablet      methylphenidate HCl (Ritalin) 10 mg tablet      methylphenidate HCl (RITALIN) 10 mg tablet   12. Medication monitoring encounter  Z51.81 V58.83 TOXASSURE SELECT 13 (MW)       DM controlled    Fibromyalgia. Asked about another rheumatologist. She will check Dr. Trudi Bailey office and let us know if the needs a referral    Chronic pain--sees pain management and is on oramorph    BP controlled    Weight gain    Depression. Continue same meds    Refills as noted    F/u 3 months for next refills. She states she had run out of ritalin and went into withdrawal when she decided not to go back on it as she was not sure it was \"doing anything. \" And she then got very fatigued and could not focus and was sleepy all day.  This has been used as an adjunct to her depression as well as for fatigue

## 2022-05-04 RX ORDER — ISOPROPYL ALCOHOL 70 ML/100ML
SWAB TOPICAL
Qty: 200 PAD | Refills: 3 | Status: SHIPPED | OUTPATIENT
Start: 2022-05-04

## 2022-05-11 LAB
ALBUMIN SERPL-MCNC: 4.3 G/DL (ref 3.8–4.8)
ALBUMIN/GLOB SERPL: 1.9 {RATIO} (ref 1.2–2.2)
ALP SERPL-CCNC: 63 IU/L (ref 44–121)
ALT SERPL-CCNC: 8 IU/L (ref 0–32)
AST SERPL-CCNC: 13 IU/L (ref 0–40)
BILIRUB SERPL-MCNC: <0.2 MG/DL (ref 0–1.2)
BUN SERPL-MCNC: 17 MG/DL (ref 8–27)
BUN/CREAT SERPL: 13 (ref 12–28)
CALCIUM SERPL-MCNC: 8.9 MG/DL (ref 8.7–10.3)
CHLORIDE SERPL-SCNC: 108 MMOL/L (ref 96–106)
CHOLEST SERPL-MCNC: 124 MG/DL (ref 100–199)
CO2 SERPL-SCNC: 17 MMOL/L (ref 20–29)
CREAT SERPL-MCNC: 1.36 MG/DL (ref 0.57–1)
DRUGS UR: NORMAL
EGFR: 43 ML/MIN/1.73
GLOBULIN SER CALC-MCNC: 2.3 G/DL (ref 1.5–4.5)
GLUCOSE SERPL-MCNC: 105 MG/DL (ref 65–99)
HBA1C MFR BLD: 6.7 % (ref 4.8–5.6)
HDLC SERPL-MCNC: 40 MG/DL
IMP & REVIEW OF LAB RESULTS: NORMAL
LDLC SERPL CALC-MCNC: 60 MG/DL (ref 0–99)
POTASSIUM SERPL-SCNC: 4.3 MMOL/L (ref 3.5–5.2)
PROT SERPL-MCNC: 6.6 G/DL (ref 6–8.5)
SODIUM SERPL-SCNC: 142 MMOL/L (ref 134–144)
T4 FREE SERPL-MCNC: 0.82 NG/DL (ref 0.82–1.77)
TRIGL SERPL-MCNC: 138 MG/DL (ref 0–149)
TSH SERPL DL<=0.005 MIU/L-ACNC: 4.83 UIU/ML (ref 0.45–4.5)
VLDLC SERPL CALC-MCNC: 24 MG/DL (ref 5–40)

## 2022-05-30 DIAGNOSIS — Z76.0 MEDICATION REFILL: ICD-10-CM

## 2022-05-30 RX ORDER — SIMVASTATIN 40 MG/1
TABLET, FILM COATED ORAL
Qty: 90 TABLET | Refills: 4 | Status: SHIPPED | OUTPATIENT
Start: 2022-05-30

## 2022-07-08 LAB — HEMOCCULT STL QL IA: NEGATIVE

## 2022-07-18 DIAGNOSIS — Z76.0 MEDICATION REFILL: ICD-10-CM

## 2022-07-18 RX ORDER — LORAZEPAM 2 MG/1
TABLET ORAL
Qty: 180 TABLET | Refills: 1 | Status: SHIPPED | OUTPATIENT
Start: 2022-07-18

## 2022-08-15 DIAGNOSIS — Z76.0 MEDICATION REFILL: ICD-10-CM

## 2022-08-15 NOTE — TELEPHONE ENCOUNTER
Pharmacy fax request for a enw 90 day Rx. Lat OV 5/3/22, No future appt scheduled. Requested Prescriptions     Pending Prescriptions Disp Refills    atenoloL (TENORMIN) 25 mg tablet 90 Tablet 3     Sig: Take 1 Tablet in the morning.

## 2022-08-16 RX ORDER — ATENOLOL 25 MG/1
TABLET ORAL
Qty: 90 TABLET | Refills: 3 | Status: SHIPPED | OUTPATIENT
Start: 2022-08-16

## 2022-08-17 ENCOUNTER — TELEPHONE (OUTPATIENT)
Dept: INTERNAL MEDICINE CLINIC | Age: 62
End: 2022-08-17

## 2022-08-17 DIAGNOSIS — Z76.0 MEDICATION REFILL: ICD-10-CM

## 2022-08-17 NOTE — TELEPHONE ENCOUNTER
Pharmacy sent a clarification fax stating\"Concomitant use of Promethazine 25mg Tab with oxymorphone 5mg may cause CNS depression and sedation.  Confirm ok to fill Promethazine 25mg tab while pt is taking oxymorphone 5mg

## 2022-08-17 NOTE — TELEPHONE ENCOUNTER
Pharmacy requesting a refill. Last visit 05/03/22 No upcoming visits     Requested Prescriptions     Pending Prescriptions Disp Refills    promethazine (PHENERGAN) 25 mg tablet 60 Tablet 5     Sig: Take 1 Tablet by mouth every six (6) hours as needed for Nausea.  Indications: nausea

## 2022-08-18 RX ORDER — PROMETHAZINE HYDROCHLORIDE 25 MG/1
25 TABLET ORAL
Qty: 60 TABLET | Refills: 5 | Status: SHIPPED | OUTPATIENT
Start: 2022-08-18

## 2022-09-26 ENCOUNTER — OFFICE VISIT (OUTPATIENT)
Dept: INTERNAL MEDICINE CLINIC | Age: 62
End: 2022-09-26
Payer: MEDICARE

## 2022-09-26 VITALS
OXYGEN SATURATION: 97 % | TEMPERATURE: 97.1 F | BODY MASS INDEX: 30.06 KG/M2 | HEIGHT: 61 IN | DIASTOLIC BLOOD PRESSURE: 76 MMHG | SYSTOLIC BLOOD PRESSURE: 116 MMHG | WEIGHT: 159.2 LBS | HEART RATE: 93 BPM

## 2022-09-26 DIAGNOSIS — N18.30 STAGE 3 CHRONIC KIDNEY DISEASE, UNSPECIFIED WHETHER STAGE 3A OR 3B CKD (HCC): ICD-10-CM

## 2022-09-26 DIAGNOSIS — E78.5 DYSLIPIDEMIA: ICD-10-CM

## 2022-09-26 DIAGNOSIS — M79.7 FIBROMYALGIA: ICD-10-CM

## 2022-09-26 DIAGNOSIS — E11.21 TYPE 2 DIABETES WITH NEPHROPATHY (HCC): Primary | ICD-10-CM

## 2022-09-26 DIAGNOSIS — I10 ESSENTIAL HYPERTENSION: ICD-10-CM

## 2022-09-26 DIAGNOSIS — R94.6 ABNORMAL THYROID FUNCTION TEST: ICD-10-CM

## 2022-09-26 DIAGNOSIS — Z23 NEEDS FLU SHOT: ICD-10-CM

## 2022-09-26 PROCEDURE — 3017F COLORECTAL CA SCREEN DOC REV: CPT | Performed by: INTERNAL MEDICINE

## 2022-09-26 PROCEDURE — 99214 OFFICE O/P EST MOD 30 MIN: CPT | Performed by: INTERNAL MEDICINE

## 2022-09-26 PROCEDURE — G8427 DOCREV CUR MEDS BY ELIG CLIN: HCPCS | Performed by: INTERNAL MEDICINE

## 2022-09-26 PROCEDURE — G8417 CALC BMI ABV UP PARAM F/U: HCPCS | Performed by: INTERNAL MEDICINE

## 2022-09-26 PROCEDURE — 3044F HG A1C LEVEL LT 7.0%: CPT | Performed by: INTERNAL MEDICINE

## 2022-09-26 PROCEDURE — G8752 SYS BP LESS 140: HCPCS | Performed by: INTERNAL MEDICINE

## 2022-09-26 PROCEDURE — G8754 DIAS BP LESS 90: HCPCS | Performed by: INTERNAL MEDICINE

## 2022-09-26 PROCEDURE — G0008 ADMIN INFLUENZA VIRUS VAC: HCPCS | Performed by: INTERNAL MEDICINE

## 2022-09-26 PROCEDURE — 90686 IIV4 VACC NO PRSV 0.5 ML IM: CPT | Performed by: INTERNAL MEDICINE

## 2022-09-26 PROCEDURE — G9899 SCRN MAM PERF RSLTS DOC: HCPCS | Performed by: INTERNAL MEDICINE

## 2022-09-26 PROCEDURE — 2022F DILAT RTA XM EVC RTNOPTHY: CPT | Performed by: INTERNAL MEDICINE

## 2022-09-26 PROCEDURE — G9717 DOC PT DX DEP/BP F/U NT REQ: HCPCS | Performed by: INTERNAL MEDICINE

## 2022-09-26 NOTE — PROGRESS NOTES
HISTORY OF PRESENT ILLNESS  Ti Leal is a 57 y.o. female. /76 (BP 1 Location: Left upper arm, BP Patient Position: Sitting, BP Cuff Size: Adult)   Pulse 93   Temp 97.1 °F (36.2 °C)   Ht 5' 1\" (1.549 m)   Wt 159 lb 3.2 oz (72.2 kg)   SpO2 97%   BMI 30.08 kg/m²           Current Outpatient Medications:     promethazine (PHENERGAN) 25 mg tablet, Take 1 Tablet by mouth every six (6) hours as needed for Nausea. Indications: nausea, Disp: 60 Tablet, Rfl: 5    atenoloL (TENORMIN) 25 mg tablet, TAKE 1 TABLET EVERY DAY, Disp: 90 Tablet, Rfl: 3    LORazepam (ATIVAN) 2 mg tablet, TAKE 1 TABLET BY MOUTH TWICE DAILY. MAX DAILY AMOUNT: 4 MG. INDICATIONS: ANXIETY, Disp: 180 Tablet, Rfl: 1    simvastatin (ZOCOR) 40 mg tablet, TAKE 1 TABLET BY MOUTH EVERY NIGHT AT BEDTIME  FOR HIGH CHOLESTEROL, Disp: 90 Tablet, Rfl: 4    Blood-Glucose Meter (Accu-Chek Chelsey Plus Meter) [facility], Use to test blood sugar twice a day or as directed, Disp: 1 Each, Rfl: 1    glucose blood VI test strips (Accu-Chek Chelsey Plus test strp) strip, Use to test blood sugar twice a day or as directed, Disp: 200 Strip, Rfl: 3    Blood Glucose Control High&Low (Accu-Chek Chelsey Control Soln) soln, Use to test each new set of test strips, Disp: 1 Each, Rfl: 5    alcohol swabs (BD Single Use Swabs Regular) padm, USE AS DIRECTED TWICE A DAY OR AS DIRECTED, Disp: 200 Pad, Rfl: 3    methylphenidate HCl (Ritalin) 10 mg tablet, Take 1 Tablet by mouth three (3) times daily. Max Daily Amount: 30 mg. Indications: ADJUNCT FOR FIBROMYALGIA, Disp: 90 Tablet, Rfl: 0    methylphenidate HCl (Ritalin) 10 mg tablet, Take 1 Tablet by mouth three (3) times daily. Max Daily Amount: 30 mg. ADJUNCT FOR DEPRESSION/FIBROMYALGIA, Disp: 90 Tablet, Rfl: 0    methylphenidate HCl (RITALIN) 10 mg tablet, Take 1 Tablet by mouth three (3) times daily. Max Daily Amount: 30 mg.  Indications: ADJUNCT TO FIBROMYALGIA, Disp: 90 Tablet, Rfl: 0    topiramate (TOPAMAX) 200 mg tablet, TAKE 1 TABLET BY MOUTH TWICE DAILY, Disp: 180 Tablet, Rfl: 3    metFORMIN ER (GLUCOPHAGE XR) 750 mg tablet, TAKE 1 TABLET EVERY DAY, Disp: 90 Tablet, Rfl: 4    DULoxetine (CYMBALTA) 60 mg capsule, TAKE 1 CAPSULE EVERY DAY, Disp: 90 Capsule, Rfl: 4    methocarbamoL (ROBAXIN) 750 mg tablet, TAKE 1 TABLET BY MOUTH TWICE DAILY AS NEEDED, Disp: , Rfl:     pregabalin (LYRICA) 50 mg capsule, Take 50 mg by mouth two (2) times a day., Disp: , Rfl:     lancets (OneTouch UltraSoft Lancets) misc, Use to test blood sugar once daily, Disp: 100 Each, Rfl: 5    oxyMORphone (OPANA) 5 mg tablet, TAKE 1 TABLET BY MOUTH THREE TIMES DAILY AS NEEDED, Disp: , Rfl:     NEXIUM 40 mg capsule, TK 1 C PO QD AT 3PM, Disp: , Rfl: 11    clotrimazole (GYNE-LOTRIMIN) 2 % vaginal cream, Insert 1 Applicatorful into vagina nightly. (Patient not taking: Reported on 9/26/2022), Disp: 21 g, Rfl: 0    lidocaine (LIDODERM) 5 %, by TransDERmal route every twenty-four (24) hours. Apply patch to the affected area for 12 hours a day and remove for 12 hours a day. (Patient not taking: Reported on 9/26/2022), Disp: , Rfl:       Hypertension   The history is provided by the Patient. This is a chronic problem. The current episode started more than 1 week ago. The problem has not changed since onset. Risk factors include dyslipidemia and diabetes mellitus. Diabetes  The history is provided by the Patient. This is a chronic problem. The current episode started more than 1 week ago. The problem occurs daily. The problem has not changed since onset. Review of Systems   Constitutional:  Negative for chills and fever. Musculoskeletal:  Positive for back pain, joint pain and myalgias. Psychiatric/Behavioral:  Positive for depression. Physical Exam  Vitals and nursing note reviewed. Constitutional:       Appearance: Normal appearance. She is well-developed. Neck:     Cardiovascular:      Rate and Rhythm: Normal rate and regular rhythm.    Pulmonary: Effort: Pulmonary effort is normal.      Breath sounds: Normal breath sounds. Musculoskeletal:      Cervical back: Rigidity present. Pain with movement present. Right lower leg: No edema. Left lower leg: No edema. Skin:     General: Skin is warm and dry. Neurological:      General: No focal deficit present. Mental Status: She is alert and oriented to person, place, and time. Psychiatric:         Mood and Affect: Mood normal.         Behavior: Behavior normal.       ASSESSMENT and PLAN    ICD-10-CM ICD-9-CM    1. Type 2 diabetes with nephropathy ([facility])  T10.53 165.82 METABOLIC PANEL, COMPREHENSIVE     583.81 LIPID PANEL      HEMOGLOBIN A1C W/O EAG      2. Stage 3 chronic kidney disease, unspecified whether stage 3a or 3b CKD ([facility])  G19.68 968.9 METABOLIC PANEL, COMPREHENSIVE      3. Essential hypertension  F74 193.1 METABOLIC PANEL, COMPREHENSIVE      4. Dyslipidemia  E78.5 272.4       5. Fibromyalgia  M79.7 729.1       6. Abnormal thyroid function test  R94.6 794.5 TSH AND FREE T4      7.  Needs flu shot  Z23 V04.81 INFLUENZA, FLUARIX, FLULAVAL, FLUZONE (AGE 6 MO+), AFLURIA(AGE 3Y+) IM, PF, 0.5 ML          BP controlled    DM  has been controlled    Still going to pain management    Update lab today    Advised to get her eye exam and mammogram done before next visit    F/u 4 months HTN, DM, chol

## 2022-09-26 NOTE — PROGRESS NOTES
Ximena Connell is a 57 y.o. female (: 1960) presenting to address:    Chief Complaint   Patient presents with    Hypertension    Diabetes       Vitals:    22 1549   Temp: 97.1 °F (36.2 °C)   Weight: 159 lb 3.2 oz (72.2 kg)   Height: 5' 1\" (1.549 m)   PainSc:   8   PainLoc: Hip       Hearing/Vision:   No results found. Learning Assessment:     Learning Assessment 2018   PRIMARY LEARNER Patient   HIGHEST LEVEL OF EDUCATION - PRIMARY LEARNER  DID NOT GRADUATE HIGH SCHOOL   BARRIERS PRIMARY LEARNER NONE   CO-LEARNER CAREGIVER No   PRIMARY LANGUAGE ENGLISH   LEARNER PREFERENCE PRIMARY DEMONSTRATION     -   ANSWERED BY self   RELATIONSHIP SELF     Depression Screening:     3 most recent PHQ Screens 2022   PHQ Not Done Functional capacity motivation limits accuracy   Little interest or pleasure in doing things More than half the days   Feeling down, depressed, irritable, or hopeless More than half the days   Total Score PHQ 2 4   Trouble falling or staying asleep, or sleeping too much Several days   Feeling tired or having little energy Several days   Poor appetite, weight loss, or overeating Several days   Feeling bad about yourself - or that you are a failure or have let yourself or your family down Not at all   Trouble concentrating on things such as school, work, reading, or watching TV Not at all   Moving or speaking so slowly that other people could have noticed; or the opposite being so fidgety that others notice Not at all   Thoughts of being better off dead, or hurting yourself in some way Not at all   PHQ 9 Score 7   How difficult have these problems made it for you to do your work, take care of your home and get along with others Not difficult at all     Fall Risk Assessment:     Fall Risk Assessment, last 12 mths 5/3/2022   Able to walk? Yes   Fall in past 12 months? 0   Do you feel unsteady? 1   Are you worried about falling 1   Is TUG test greater than 12 seconds?  1   Is the gait abnormal? 1   Number of falls in past 12 months 0     Abuse Screening:     Abuse Screening Questionnaire 5/3/2022   Do you ever feel afraid of your partner? N   Are you in a relationship with someone who physically or mentally threatens you? N   Is it safe for you to go home? Y     ADL Assessment:     ADL Assessment 5/3/2022   Feeding yourself No Help Needed   Getting from bed to chair No Help Needed   Getting dressed No Help Needed   Bathing or showering No Help Needed   Walk across the room (includes cane/walker) No Help Needed   Using the telphone No Help Needed   Taking your medications No Help Needed   Preparing meals No Help Needed   Managing money (expenses/bills) No Help Needed   Moderately strenuous housework (laundry) No Help Needed   Shopping for personal items (toiletries/medicines) No Help Needed   Shopping for groceries No Help Needed   Driving No Help Needed   Climbing a flight of stairs No Help Needed   Getting to places beyond walking distances No Help Needed        Coordination of Care Questionaire:   1. \"Have you been to the ER, urgent care clinic since your last visit? Hospitalized since your last visit? \" No    2. \"Have you seen or consulted any other health care providers outside of the [address] [place] [address] [facility] since your last visit? \" Yes When: pain management apt on friday      3. For patients aged 39-70: Has the patient had a colonoscopy / FIT/ Cologuard? Yes - no Care Gap present      If the patient is female:    4. For patients aged 41-77: Has the patient had a mammogram within the past 2 years? No  See top three    5. For patients aged 21-65: Has the patient had a pap smear? Yes - no Care Gap present    Advanced Directive:   1. Do you have an Advanced Directive? NO    2. Would you like information on Advanced Directives?  NO

## 2022-09-27 LAB
ALBUMIN SERPL-MCNC: 4 G/DL (ref 3.8–4.8)
ALBUMIN/GLOB SERPL: 1.5 {RATIO} (ref 1.2–2.2)
ALP SERPL-CCNC: 65 IU/L (ref 44–121)
ALT SERPL-CCNC: 10 IU/L (ref 0–32)
AST SERPL-CCNC: 12 IU/L (ref 0–40)
BILIRUB SERPL-MCNC: <0.2 MG/DL (ref 0–1.2)
BUN SERPL-MCNC: 20 MG/DL (ref 8–27)
BUN/CREAT SERPL: 19 (ref 12–28)
CALCIUM SERPL-MCNC: 8.7 MG/DL (ref 8.7–10.3)
CHLORIDE SERPL-SCNC: 110 MMOL/L (ref 96–106)
CHOLEST SERPL-MCNC: 142 MG/DL (ref 100–199)
CO2 SERPL-SCNC: 16 MMOL/L (ref 20–29)
CREAT SERPL-MCNC: 1.08 MG/DL (ref 0.57–1)
EGFR: 57 ML/MIN/1.73
GLOBULIN SER CALC-MCNC: 2.6 G/DL (ref 1.5–4.5)
GLUCOSE SERPL-MCNC: ABNORMAL MG/DL
HBA1C MFR BLD: 6.5 % (ref 4.8–5.6)
HDLC SERPL-MCNC: 43 MG/DL
IMP & REVIEW OF LAB RESULTS: NORMAL
LDLC SERPL CALC-MCNC: 78 MG/DL (ref 0–99)
POTASSIUM SERPL-SCNC: ABNORMAL MMOL/L
PROT SERPL-MCNC: 6.6 G/DL (ref 6–8.5)
SODIUM SERPL-SCNC: 144 MMOL/L (ref 134–144)
T4 FREE SERPL-MCNC: 0.63 NG/DL (ref 0.82–1.77)
TRIGL SERPL-MCNC: 113 MG/DL (ref 0–149)
TSH SERPL DL<=0.005 MIU/L-ACNC: 2.89 UIU/ML (ref 0.45–4.5)
VLDLC SERPL CALC-MCNC: 21 MG/DL (ref 5–40)

## 2022-11-11 DIAGNOSIS — Z76.0 MEDICATION REFILL: ICD-10-CM

## 2022-11-14 RX ORDER — PROMETHAZINE HYDROCHLORIDE 25 MG/1
TABLET ORAL
Qty: 60 TABLET | Refills: 5 | Status: SHIPPED | OUTPATIENT
Start: 2022-11-14

## 2022-12-12 ENCOUNTER — TELEPHONE (OUTPATIENT)
Dept: MAMMOGRAPHY | Age: 62
End: 2022-12-12

## 2023-01-18 DIAGNOSIS — Z76.0 MEDICATION REFILL: ICD-10-CM

## 2023-01-18 RX ORDER — LORAZEPAM 2 MG/1
TABLET ORAL
Qty: 180 TABLET | Refills: 1 | Status: SHIPPED | OUTPATIENT
Start: 2023-01-18

## 2023-02-15 RX ORDER — DULOXETIN HYDROCHLORIDE 60 MG/1
CAPSULE, DELAYED RELEASE ORAL
Qty: 90 CAPSULE | Refills: 0 | Status: SHIPPED | OUTPATIENT
Start: 2023-02-15

## 2023-02-15 RX ORDER — METFORMIN HYDROCHLORIDE 750 MG/1
TABLET, EXTENDED RELEASE ORAL
Qty: 90 TABLET | Refills: 0 | Status: SHIPPED | OUTPATIENT
Start: 2023-02-15

## 2023-02-16 NOTE — TELEPHONE ENCOUNTER
Spoke with pt in regards to medication refill. Two patient identifier's verified. Relayed the PCP's note. Pt is agreeable and is scheduled for 04/03/2023. Will notify.

## 2023-04-01 PROBLEM — M46.1 SACROILIITIS, NOT ELSEWHERE CLASSIFIED (HCC): Status: ACTIVE | Noted: 2023-04-01

## 2023-05-10 ENCOUNTER — OFFICE VISIT (OUTPATIENT)
Facility: CLINIC | Age: 63
End: 2023-05-10
Payer: MEDICARE

## 2023-05-10 VITALS
HEART RATE: 112 BPM | TEMPERATURE: 97.9 F | RESPIRATION RATE: 15 BRPM | DIASTOLIC BLOOD PRESSURE: 85 MMHG | OXYGEN SATURATION: 96 % | BODY MASS INDEX: 31.15 KG/M2 | SYSTOLIC BLOOD PRESSURE: 141 MMHG | HEIGHT: 61 IN | WEIGHT: 165 LBS

## 2023-05-10 DIAGNOSIS — Z11.4 SCREENING FOR HIV (HUMAN IMMUNODEFICIENCY VIRUS): ICD-10-CM

## 2023-05-10 DIAGNOSIS — M79.7 FIBROMYALGIA: ICD-10-CM

## 2023-05-10 DIAGNOSIS — Z00.00 MEDICARE ANNUAL WELLNESS VISIT, SUBSEQUENT: Primary | ICD-10-CM

## 2023-05-10 DIAGNOSIS — Z76.0 MEDICATION REFILL: ICD-10-CM

## 2023-05-10 DIAGNOSIS — N18.31 STAGE 3A CHRONIC KIDNEY DISEASE (HCC): ICD-10-CM

## 2023-05-10 DIAGNOSIS — E11.21 TYPE 2 DIABETES MELLITUS WITH DIABETIC NEPHROPATHY, WITHOUT LONG-TERM CURRENT USE OF INSULIN (HCC): ICD-10-CM

## 2023-05-10 DIAGNOSIS — Z12.31 SCREENING MAMMOGRAM FOR HIGH-RISK PATIENT: ICD-10-CM

## 2023-05-10 DIAGNOSIS — L08.9 SKIN INFECTION: ICD-10-CM

## 2023-05-10 DIAGNOSIS — I10 ESSENTIAL (PRIMARY) HYPERTENSION: ICD-10-CM

## 2023-05-10 DIAGNOSIS — M46.1 SACROILIITIS, NOT ELSEWHERE CLASSIFIED (HCC): ICD-10-CM

## 2023-05-10 DIAGNOSIS — L98.9 SKIN LESION: ICD-10-CM

## 2023-05-10 DIAGNOSIS — E78.5 HYPERLIPIDEMIA, UNSPECIFIED HYPERLIPIDEMIA TYPE: ICD-10-CM

## 2023-05-10 PROCEDURE — 1123F ACP DISCUSS/DSCN MKR DOCD: CPT | Performed by: INTERNAL MEDICINE

## 2023-05-10 PROCEDURE — 1090F PRES/ABSN URINE INCON ASSESS: CPT | Performed by: INTERNAL MEDICINE

## 2023-05-10 PROCEDURE — G8400 PT W/DXA NO RESULTS DOC: HCPCS | Performed by: INTERNAL MEDICINE

## 2023-05-10 PROCEDURE — 1036F TOBACCO NON-USER: CPT | Performed by: INTERNAL MEDICINE

## 2023-05-10 PROCEDURE — G8417 CALC BMI ABV UP PARAM F/U: HCPCS | Performed by: INTERNAL MEDICINE

## 2023-05-10 PROCEDURE — 3017F COLORECTAL CA SCREEN DOC REV: CPT | Performed by: INTERNAL MEDICINE

## 2023-05-10 PROCEDURE — 3077F SYST BP >= 140 MM HG: CPT | Performed by: INTERNAL MEDICINE

## 2023-05-10 PROCEDURE — 3079F DIAST BP 80-89 MM HG: CPT | Performed by: INTERNAL MEDICINE

## 2023-05-10 PROCEDURE — 3046F HEMOGLOBIN A1C LEVEL >9.0%: CPT | Performed by: INTERNAL MEDICINE

## 2023-05-10 PROCEDURE — G0439 PPPS, SUBSEQ VISIT: HCPCS | Performed by: INTERNAL MEDICINE

## 2023-05-10 PROCEDURE — 99214 OFFICE O/P EST MOD 30 MIN: CPT | Performed by: INTERNAL MEDICINE

## 2023-05-10 PROCEDURE — 2022F DILAT RTA XM EVC RTNOPTHY: CPT | Performed by: INTERNAL MEDICINE

## 2023-05-10 PROCEDURE — G8427 DOCREV CUR MEDS BY ELIG CLIN: HCPCS | Performed by: INTERNAL MEDICINE

## 2023-05-10 RX ORDER — MORPHINE SULFATE 15 MG/1
TABLET ORAL
COMMUNITY
Start: 2023-03-17

## 2023-05-10 RX ORDER — LORAZEPAM 2 MG/1
2 TABLET ORAL EVERY 6 HOURS PRN
Qty: 180 TABLET | Refills: 1 | Status: SHIPPED | OUTPATIENT
Start: 2023-05-10 | End: 2023-11-06

## 2023-05-10 RX ORDER — METHYLPHENIDATE HYDROCHLORIDE 10 MG/1
10 TABLET ORAL DAILY
Qty: 30 TABLET | Refills: 0 | Status: SHIPPED | OUTPATIENT
Start: 2023-07-05 | End: 2023-05-10 | Stop reason: SDUPTHER

## 2023-05-10 RX ORDER — PREGABALIN 50 MG/1
50 CAPSULE ORAL 2 TIMES DAILY
Qty: 180 CAPSULE | Refills: 1 | Status: SHIPPED | OUTPATIENT
Start: 2023-05-10 | End: 2023-11-06

## 2023-05-10 RX ORDER — CEPHALEXIN 500 MG/1
500 CAPSULE ORAL 3 TIMES DAILY
Qty: 30 CAPSULE | Refills: 0 | Status: SHIPPED | OUTPATIENT
Start: 2023-05-10 | End: 2023-05-20

## 2023-05-10 RX ORDER — METHYLPHENIDATE HYDROCHLORIDE 10 MG/1
10 TABLET ORAL 3 TIMES DAILY
Qty: 90 TABLET | Refills: 0 | Status: SHIPPED | OUTPATIENT
Start: 2023-06-07 | End: 2023-07-07

## 2023-05-10 RX ORDER — METHYLPHENIDATE HYDROCHLORIDE 10 MG/1
10 TABLET ORAL 3 TIMES DAILY
Qty: 90 TABLET | Refills: 0 | Status: SHIPPED | OUTPATIENT
Start: 2023-07-05 | End: 2023-08-04

## 2023-05-10 RX ORDER — METHYLPHENIDATE HYDROCHLORIDE 10 MG/1
10 TABLET ORAL 3 TIMES DAILY
Qty: 90 TABLET | Refills: 0 | Status: SHIPPED | OUTPATIENT
Start: 2023-05-10 | End: 2023-06-09

## 2023-05-10 RX ORDER — DULOXETIN HYDROCHLORIDE 30 MG/1
30 CAPSULE, DELAYED RELEASE ORAL DAILY
Qty: 90 CAPSULE | Refills: 1 | Status: SHIPPED | OUTPATIENT
Start: 2023-05-10

## 2023-05-10 SDOH — ECONOMIC STABILITY: FOOD INSECURITY: WITHIN THE PAST 12 MONTHS, YOU WORRIED THAT YOUR FOOD WOULD RUN OUT BEFORE YOU GOT MONEY TO BUY MORE.: NEVER TRUE

## 2023-05-10 SDOH — ECONOMIC STABILITY: HOUSING INSECURITY
IN THE LAST 12 MONTHS, WAS THERE A TIME WHEN YOU DID NOT HAVE A STEADY PLACE TO SLEEP OR SLEPT IN A SHELTER (INCLUDING NOW)?: NO

## 2023-05-10 SDOH — ECONOMIC STABILITY: INCOME INSECURITY: HOW HARD IS IT FOR YOU TO PAY FOR THE VERY BASICS LIKE FOOD, HOUSING, MEDICAL CARE, AND HEATING?: NOT VERY HARD

## 2023-05-10 SDOH — ECONOMIC STABILITY: FOOD INSECURITY: WITHIN THE PAST 12 MONTHS, THE FOOD YOU BOUGHT JUST DIDN'T LAST AND YOU DIDN'T HAVE MONEY TO GET MORE.: NEVER TRUE

## 2023-05-10 ASSESSMENT — PATIENT HEALTH QUESTIONNAIRE - PHQ9
9. THOUGHTS THAT YOU WOULD BE BETTER OFF DEAD, OR OF HURTING YOURSELF: 0
10. IF YOU CHECKED OFF ANY PROBLEMS, HOW DIFFICULT HAVE THESE PROBLEMS MADE IT FOR YOU TO DO YOUR WORK, TAKE CARE OF THINGS AT HOME, OR GET ALONG WITH OTHER PEOPLE: 2
8. MOVING OR SPEAKING SO SLOWLY THAT OTHER PEOPLE COULD HAVE NOTICED. OR THE OPPOSITE, BEING SO FIGETY OR RESTLESS THAT YOU HAVE BEEN MOVING AROUND A LOT MORE THAN USUAL: 3
SUM OF ALL RESPONSES TO PHQ QUESTIONS 1-9: 9
5. POOR APPETITE OR OVEREATING: 0
2. FEELING DOWN, DEPRESSED OR HOPELESS: 2
SUM OF ALL RESPONSES TO PHQ QUESTIONS 1-9: 9
SUM OF ALL RESPONSES TO PHQ QUESTIONS 1-9: 9
7. TROUBLE CONCENTRATING ON THINGS, SUCH AS READING THE NEWSPAPER OR WATCHING TELEVISION: 0
6. FEELING BAD ABOUT YOURSELF - OR THAT YOU ARE A FAILURE OR HAVE LET YOURSELF OR YOUR FAMILY DOWN: 0
SUM OF ALL RESPONSES TO PHQ QUESTIONS 1-9: 9
3. TROUBLE FALLING OR STAYING ASLEEP: 1
4. FEELING TIRED OR HAVING LITTLE ENERGY: 3

## 2023-05-10 ASSESSMENT — LIFESTYLE VARIABLES
HOW OFTEN DO YOU HAVE A DRINK CONTAINING ALCOHOL: NEVER
HOW MANY STANDARD DRINKS CONTAINING ALCOHOL DO YOU HAVE ON A TYPICAL DAY: PATIENT DOES NOT DRINK

## 2023-05-10 NOTE — PATIENT INSTRUCTIONS
back and forth. Keep your balance while you stand or walk. Move from sitting to standing or from a bed to a chair. Button or unbutton a shirt or sweater. Remove and put on your shoes. It's normal to feel a little worried or anxious if you find you can't do all the things you used to be able to do. Talking with your doctor about ADLs isn't a test that you either pass or fail. It's just a way to get more information about your health and safety. Follow-up care is a key part of your treatment and safety. Be sure to make and go to all appointments, and call your doctor if you are having problems. It's also a good idea to know your test results and keep a list of the medicines you take. Current as of: June 6, 2022               Content Version: 13.6  © 2006-2023 [employer]. Care instructions adapted under license by Eloina Chan. If you have questions about a medical condition or this instruction, always ask your healthcare professional. [place] [address] any warranty or liability for your use of this information. Advance Directives: Care Instructions  Overview  An advance directive is a legal way to state your wishes at the end of your life. It tells your family and your doctor what to do if you can't say what you want. There are two main types of advance directives. You can change them any time your wishes change. Living will. This form tells your family and your doctor your wishes about life support and other treatment. The form is also called a declaration. Medical power of . This form lets you name a person to make treatment decisions for you when you can't speak for yourself. This person is called a health care agent (health care proxy, health care surrogate). The form is also called a durable power of  for health care.   If you do not have an advance directive, decisions about your medical care may be made by a family member, or by a doctor or a

## 2023-05-10 NOTE — PROGRESS NOTES
Medicare Annual Wellness Visit    Olvin Wagoner is here for Ear Injury and Medicare AWV    Assessment & Plan   Medicare annual wellness visit, subsequent  Screening for HIV (human immunodeficiency virus)  Essential (primary) hypertension  Hyperlipidemia, unspecified hyperlipidemia type  Type 2 diabetes mellitus with diabetic nephropathy, without long-term current use of insulin (HCC)  Fibromyalgia  Skin lesion  Sacroiliitis, not elsewhere classified ([facility]  Stage 3a chronic kidney disease ([facility]    Recommendations for Preventive Services Due: see orders and patient instructions/AVS.  Recommended screening schedule for the next 5-10 years is provided to the patient in written form: see Patient Instructions/AVS.     No follow-ups on file. Subjective       Patient's complete Health Risk Assessment and screening values have been reviewed and are found in Flowsheets. The following problems were reviewed today and where indicated follow up appointments were made and/or referrals ordered. Positive Risk Factor Screenings with Interventions:    Fall Risk:  Do you feel unsteady or are you worried about falling? : (!) yes  2 or more falls in past year?: no  Fall with injury in past year?: no     Interventions:    See AVS for additional education material     Interventions:  See AVS for additional education material    Depression:  PHQ-9 Total Score: 9    Interpretation:   1-4 = minimal  5-9 = mild  10-14 = moderate  15-19 = moderately severe  20-27 = severe  Interventions:  See AVS for additional education material           Opioid Risk: (Low risk score <55) Opioid risk score: 43    Patient is low risk for opioid use disorder or overdose.   Last PDMP [place] as Reviewed:  Review User Review Instant Review Result                General HRA Questions:  Select all that apply: (!) Anger    Anger Interventions:  See AVS for additional education material       Weight and Activity:  Physical Activity: Inactive    Days of

## 2023-05-10 NOTE — PROGRESS NOTES
HISTORY OF PRESENT ILLNESS      Maryse Player is a 70 y.o. female. BP (!) 141/85 (Site: Right Upper Arm, Position: Sitting, Cuff Size: Medium Adult) Comment: w/meds  Pulse (!) 112   Temp 97.9 °F (36.6 °C) (Temporal)   Resp 15   Ht 5' 1\" (1.549 m)   Wt 165 lb (74.8 kg)   SpO2 96%   BMI 31.18 kg/m²     Has had a sore on her left ear for a few weeks. Will crust over but then breakdown again and bleed. She is not sure if she could have burned her ear with a curling iron. Skin Problem  This is a new (see comments) problem. Diabetes  She presents for her follow-up diabetic visit. She has type 2 diabetes mellitus. Review of Systems   Musculoskeletal:  Positive for arthralgias, myalgias, neck pain and neck stiffness. Chronic widespread pain. Pain management has her on oral morphine         Physical Exam  Vitals and nursing note reviewed. Constitutional:       Appearance: Normal appearance. HENT:      Head: Normocephalic and atraumatic. Ears:     Cardiovascular:      Rate and Rhythm: Normal rate and regular rhythm. Pulmonary:      Effort: Pulmonary effort is normal.      Breath sounds: Normal breath sounds. Musculoskeletal:      Right lower leg: No edema. Left lower leg: No edema. Skin:     General: Skin is warm and dry. Neurological:      General: No focal deficit present. Mental Status: She is alert and oriented to person, place, and time. Psychiatric:         Mood and Affect: Mood normal.         Behavior: Behavior normal.       ASSESSMENT and PLAN    ICD-10-CM             2. Skin lesion  L98.9 CBC with Auto Differential      3. Skin infection  L08.9 cephALEXin (KEFLEX) 500 MG capsule      4. Essential (primary) hypertension  I10 Comprehensive Metabolic Panel     Lipid Panel      5. Hyperlipidemia, unspecified hyperlipidemia type  E78.5 Lipid Panel      6.  Type 2 diabetes mellitus with diabetic nephropathy, without long-term current use of insulin (HCC)  E11.21

## 2023-05-11 LAB
ALBUMIN SERPL-MCNC: 4.6 G/DL (ref 3.8–4.8)
ALBUMIN/CREAT UR: 28 MG/G CREAT (ref 0–29)
ALBUMIN/GLOB SERPL: 1.8 {RATIO} (ref 1.2–2.2)
ALP SERPL-CCNC: 81 IU/L (ref 44–121)
ALT SERPL-CCNC: 12 IU/L (ref 0–32)
AST SERPL-CCNC: 13 IU/L (ref 0–40)
BASOPHILS # BLD AUTO: 0 X10E3/UL (ref 0–0.2)
BASOPHILS NFR BLD AUTO: 0 %
BILIRUB SERPL-MCNC: <0.2 MG/DL (ref 0–1.2)
BUN SERPL-MCNC: 11 MG/DL (ref 8–27)
BUN/CREAT SERPL: 8 (ref 12–28)
CALCIUM SERPL-MCNC: 9.2 MG/DL (ref 8.7–10.3)
CHLORIDE SERPL-SCNC: 101 MMOL/L (ref 96–106)
CHOLEST SERPL-MCNC: 118 MG/DL (ref 100–199)
CO2 SERPL-SCNC: 17 MMOL/L (ref 20–29)
CREAT SERPL-MCNC: 1.4 MG/DL (ref 0.57–1)
CREAT UR-MCNC: 199.1 MG/DL
EGFRCR SERPLBLD CKD-EPI 2021: 42 ML/MIN/1.73
EOSINOPHIL # BLD AUTO: 0 X10E3/UL (ref 0–0.4)
EOSINOPHIL NFR BLD AUTO: 0 %
ERYTHROCYTE [DISTWIDTH] IN BLOOD BY AUTOMATED COUNT: 13.3 % (ref 11.7–15.4)
GLOBULIN SER CALC-MCNC: 2.5 G/DL (ref 1.5–4.5)
GLUCOSE SERPL-MCNC: 294 MG/DL (ref 70–99)
HBA1C MFR BLD: 9.9 % (ref 4.8–5.6)
HCT VFR BLD AUTO: 41.9 % (ref 34–46.6)
HDLC SERPL-MCNC: 45 MG/DL
HGB BLD-MCNC: 13.8 G/DL (ref 11.1–15.9)
HIV 1+2 AB+HIV1 P24 AG SERPL QL IA: NON REACTIVE
IMM GRANULOCYTES # BLD AUTO: 0.1 X10E3/UL (ref 0–0.1)
IMM GRANULOCYTES NFR BLD AUTO: 1 %
LDLC SERPL CALC-MCNC: 49 MG/DL (ref 0–99)
LYMPHOCYTES # BLD AUTO: 2.7 X10E3/UL (ref 0.7–3.1)
LYMPHOCYTES NFR BLD AUTO: 22 %
MCH RBC QN AUTO: 28.4 PG (ref 26.6–33)
MCHC RBC AUTO-ENTMCNC: 32.9 G/DL (ref 31.5–35.7)
MCV RBC AUTO: 86 FL (ref 79–97)
MICROALBUMIN UR-MCNC: 56.5 UG/ML
MONOCYTES # BLD AUTO: 0.6 X10E3/UL (ref 0.1–0.9)
MONOCYTES NFR BLD AUTO: 5 %
NEUTROPHILS # BLD AUTO: 8.8 X10E3/UL (ref 1.4–7)
NEUTROPHILS NFR BLD AUTO: 72 %
PLATELET # BLD AUTO: 312 X10E3/UL (ref 150–450)
POTASSIUM SERPL-SCNC: 4.3 MMOL/L (ref 3.5–5.2)
PROT SERPL-MCNC: 7.1 G/DL (ref 6–8.5)
RBC # BLD AUTO: 4.86 X10E6/UL (ref 3.77–5.28)
SODIUM SERPL-SCNC: 136 MMOL/L (ref 134–144)
SPECIMEN STATUS REPORT: NORMAL
TRIGL SERPL-MCNC: 136 MG/DL (ref 0–149)
VLDLC SERPL CALC-MCNC: 24 MG/DL (ref 5–40)
WBC # BLD AUTO: 12.2 X10E3/UL (ref 3.4–10.8)

## 2023-06-22 RX ORDER — SIMVASTATIN 40 MG
TABLET ORAL
Qty: 90 TABLET | Refills: 3 | Status: SHIPPED | OUTPATIENT
Start: 2023-06-22

## 2023-06-28 ENCOUNTER — TELEPHONE (OUTPATIENT)
Facility: CLINIC | Age: 63
End: 2023-06-28

## 2023-06-29 RX ORDER — PROMETHAZINE HYDROCHLORIDE 25 MG/1
25 TABLET ORAL EVERY 6 HOURS PRN
Qty: 60 TABLET | Refills: 5 | Status: SHIPPED | OUTPATIENT
Start: 2023-06-29

## 2023-07-18 DIAGNOSIS — Z76.0 MEDICATION REFILL: ICD-10-CM

## 2023-07-18 DIAGNOSIS — M79.7 FIBROMYALGIA: ICD-10-CM

## 2023-07-18 RX ORDER — LORAZEPAM 2 MG/1
2 TABLET ORAL EVERY 6 HOURS PRN
Qty: 180 TABLET | Refills: 1 | Status: SHIPPED | OUTPATIENT
Start: 2023-07-18 | End: 2024-01-14

## 2023-07-18 RX ORDER — METHYLPHENIDATE HYDROCHLORIDE 10 MG/1
10 TABLET ORAL 3 TIMES DAILY
Qty: 90 TABLET | Refills: 0 | Status: SHIPPED | OUTPATIENT
Start: 2023-07-18 | End: 2023-08-17

## 2023-07-18 NOTE — TELEPHONE ENCOUNTER
Patient request for refill. Last OV 5/10/23, next scheduled 11/13/23. Please send to [facility] on file. LORazepam (ATIVAN) 2 MG tablet [[id number]]     Order Details  Dose: 2 mg Route: Oral Frequency: EVERY 6 HOURS PRN for Anxiety   Dispense Quantity: 180 tablet Refills: 1          Sig: Take 1 tablet by mouth every 6 hours as needed for Anxiety for up to 180 days. Max Daily Amount: 8 mg         methylphenidate (RITALIN) 10 MG tablet [[id number]]     Order Details  Dose: 10 mg Route: Oral Frequency: 3 TIMES DAILY   Dispense Quantity: 90 tablet Refills: 0          Sig: Take 1 tablet by mouth 3 times daily for 30 days.  Max Daily Amount: 30 mg

## 2023-07-19 NOTE — TELEPHONE ENCOUNTER
Orders Placed This Encounter    LORazepam (ATIVAN) 2 MG tablet     Sig: Take 1 tablet by mouth every 6 hours as needed for Anxiety for up to 180 days. Max Daily Amount: 8 mg     Dispense:  180 tablet     Refill:  1    methylphenidate (RITALIN) 10 MG tablet     Sig: Take 1 tablet by mouth 3 times daily for 30 days. Max Daily Amount: 30 mg     Dispense:  90 tablet     Refill:  0           But she has ritalin refills at her [facility].

## 2023-08-13 RX ORDER — METFORMIN HYDROCHLORIDE 750 MG/1
TABLET, EXTENDED RELEASE ORAL
Qty: 90 TABLET | Refills: 3 | Status: SHIPPED | OUTPATIENT
Start: 2023-08-13

## 2023-09-14 RX ORDER — ATENOLOL 25 MG/1
TABLET ORAL
Qty: 90 TABLET | Refills: 1 | Status: SHIPPED | OUTPATIENT
Start: 2023-09-14

## 2023-10-23 ENCOUNTER — TELEPHONE (OUTPATIENT)
Facility: CLINIC | Age: 63
End: 2023-10-23

## 2023-10-23 DIAGNOSIS — F41.1 GAD (GENERALIZED ANXIETY DISORDER): ICD-10-CM

## 2023-10-23 NOTE — TELEPHONE ENCOUNTER
Patient is calling in stating her regular pharmacy on [address] has 15 pills of the Lorazepam and does not know when the next shipment will be in. Asking if a new Rx can please be sent to the [facility] on file as patient will be out of medication quickly.

## 2023-10-25 NOTE — TELEPHONE ENCOUNTER
Per message from 10/23/23 patient is calling to get an update if the prescription was sent to the [facility] [address] [address]. Patient states she's completely out of the medication as of today. Patient states she's in pain .

## 2023-10-27 DIAGNOSIS — F41.1 GAD (GENERALIZED ANXIETY DISORDER): Primary | ICD-10-CM

## 2023-10-27 RX ORDER — LORAZEPAM 2 MG/1
TABLET ORAL
Qty: 28 TABLET | Refills: 0 | Status: SHIPPED
Start: 2023-10-27 | End: 2023-10-27 | Stop reason: CLARIF

## 2023-10-27 RX ORDER — LORAZEPAM 2 MG/1
TABLET ORAL
Qty: 28 TABLET | Refills: 0 | Status: SHIPPED | OUTPATIENT
Start: 2023-10-27 | End: 2023-11-06

## 2023-10-27 NOTE — TELEPHONE ENCOUNTER
It is [facility] [id number] - [place], [address] [facility] [place]

## 2023-10-27 NOTE — TELEPHONE ENCOUNTER
I was advised the pt wanted this sent to the [facility] Now this states the [facility] [employer] [facility]    I have cancelled the [place] order and sent a new order to [facility]    She needs to contact [address] [place] [address] for her refills which are still active!

## 2023-10-27 NOTE — TELEPHONE ENCOUNTER
Orders Placed This Encounter    LORazepam (ATIVAN) 2 MG tablet     Sig: Take 1 tablet by mouth every 6 hours as needed for anxiety     Dispense:  28 tablet     Refill:  0     Encounter Diagnosis   Name Primary? ELLIOT (generalized anxiety disorder) Yes         Sent to the [facility]  One week supply    She needs to contact [address] [place] [address] and ask for her refill.

## 2023-11-07 RX ORDER — GLUCOSAM/CHON-MSM1/C/MANG/BOSW 500-416.6
TABLET ORAL
Qty: 100 EACH | Refills: 3 | Status: SHIPPED | OUTPATIENT
Start: 2023-11-07

## 2023-11-07 RX ORDER — CALCIUM CITRATE/VITAMIN D3 200MG-6.25
TABLET ORAL
Qty: 100 EACH | Refills: 3 | Status: SHIPPED | OUTPATIENT
Start: 2023-11-07

## 2023-11-07 RX ORDER — BLOOD-GLUCOSE METER
KIT MISCELLANEOUS
Qty: 1 KIT | Refills: 0 | Status: SHIPPED | OUTPATIENT
Start: 2023-11-07

## 2023-11-07 RX ORDER — LANCETS 30 GAUGE
EACH MISCELLANEOUS
Qty: 100 EACH | Refills: 5 | Status: SHIPPED | OUTPATIENT
Start: 2023-11-07

## 2023-11-07 NOTE — TELEPHONE ENCOUNTER
I spoke with pt and she would like a new glucometer and testing supplies. She states she is testing 1 time a day. Dianne sent an order over via fax.     Last Appointment:  5/10/2023  Future Appointments   Date Time Provider [address] [place] [address]   11/13/2023  2:00 PM Mireya Mahajan MD [facility]

## 2023-12-06 ENCOUNTER — OFFICE VISIT (OUTPATIENT)
Facility: CLINIC | Age: 63
End: 2023-12-06
Payer: MEDICARE

## 2023-12-06 VITALS
TEMPERATURE: 96 F | DIASTOLIC BLOOD PRESSURE: 74 MMHG | HEART RATE: 118 BPM | SYSTOLIC BLOOD PRESSURE: 113 MMHG | RESPIRATION RATE: 24 BRPM | OXYGEN SATURATION: 94 %

## 2023-12-06 DIAGNOSIS — I10 ESSENTIAL (PRIMARY) HYPERTENSION: ICD-10-CM

## 2023-12-06 DIAGNOSIS — Z23 ENCOUNTER FOR IMMUNIZATION: ICD-10-CM

## 2023-12-06 DIAGNOSIS — M70.62 TROCHANTERIC BURSITIS OF BOTH HIPS: ICD-10-CM

## 2023-12-06 DIAGNOSIS — Z12.11 SCREEN FOR COLON CANCER: ICD-10-CM

## 2023-12-06 DIAGNOSIS — Z76.0 MEDICATION REFILL: ICD-10-CM

## 2023-12-06 DIAGNOSIS — E11.21 TYPE 2 DIABETES MELLITUS WITH DIABETIC NEPHROPATHY, WITHOUT LONG-TERM CURRENT USE OF INSULIN (HCC): Primary | ICD-10-CM

## 2023-12-06 DIAGNOSIS — R30.0 DYSURIA: ICD-10-CM

## 2023-12-06 DIAGNOSIS — M70.61 TROCHANTERIC BURSITIS OF BOTH HIPS: ICD-10-CM

## 2023-12-06 DIAGNOSIS — M51.37 DEGENERATION OF LUMBAR OR LUMBOSACRAL INTERVERTEBRAL DISC: ICD-10-CM

## 2023-12-06 DIAGNOSIS — E78.5 HYPERLIPIDEMIA, UNSPECIFIED HYPERLIPIDEMIA TYPE: ICD-10-CM

## 2023-12-06 DIAGNOSIS — F41.1 GAD (GENERALIZED ANXIETY DISORDER): ICD-10-CM

## 2023-12-06 LAB
BILIRUBIN, URINE, POC: ABNORMAL
BLOOD URINE, POC: NEGATIVE
GLUCOSE URINE, POC: NEGATIVE
KETONES, URINE, POC: ABNORMAL
LEUKOCYTE ESTERASE, URINE, POC: NEGATIVE
NITRITE, URINE, POC: NEGATIVE
PH, URINE, POC: 5.5 (ref 4.6–8)
PROTEIN,URINE, POC: ABNORMAL
SPECIFIC GRAVITY, URINE, POC: 1.02 (ref 1–1.03)
URINALYSIS CLARITY, POC: ABNORMAL
URINALYSIS COLOR, POC: YELLOW
UROBILINOGEN, POC: ABNORMAL

## 2023-12-06 PROCEDURE — 81001 URINALYSIS AUTO W/SCOPE: CPT | Performed by: INTERNAL MEDICINE

## 2023-12-06 PROCEDURE — 99214 OFFICE O/P EST MOD 30 MIN: CPT | Performed by: INTERNAL MEDICINE

## 2023-12-06 PROCEDURE — 1123F ACP DISCUSS/DSCN MKR DOCD: CPT | Performed by: INTERNAL MEDICINE

## 2023-12-06 PROCEDURE — G8400 PT W/DXA NO RESULTS DOC: HCPCS | Performed by: INTERNAL MEDICINE

## 2023-12-06 PROCEDURE — 90694 VACC AIIV4 NO PRSRV 0.5ML IM: CPT | Performed by: INTERNAL MEDICINE

## 2023-12-06 PROCEDURE — G8417 CALC BMI ABV UP PARAM F/U: HCPCS | Performed by: INTERNAL MEDICINE

## 2023-12-06 PROCEDURE — 3017F COLORECTAL CA SCREEN DOC REV: CPT | Performed by: INTERNAL MEDICINE

## 2023-12-06 PROCEDURE — G8484 FLU IMMUNIZE NO ADMIN: HCPCS | Performed by: INTERNAL MEDICINE

## 2023-12-06 PROCEDURE — 3046F HEMOGLOBIN A1C LEVEL >9.0%: CPT | Performed by: INTERNAL MEDICINE

## 2023-12-06 PROCEDURE — G0008 ADMIN INFLUENZA VIRUS VAC: HCPCS | Performed by: INTERNAL MEDICINE

## 2023-12-06 PROCEDURE — G8428 CUR MEDS NOT DOCUMENT: HCPCS | Performed by: INTERNAL MEDICINE

## 2023-12-06 PROCEDURE — 2022F DILAT RTA XM EVC RTNOPTHY: CPT | Performed by: INTERNAL MEDICINE

## 2023-12-06 PROCEDURE — 1090F PRES/ABSN URINE INCON ASSESS: CPT | Performed by: INTERNAL MEDICINE

## 2023-12-06 PROCEDURE — 3074F SYST BP LT 130 MM HG: CPT | Performed by: INTERNAL MEDICINE

## 2023-12-06 PROCEDURE — 3078F DIAST BP <80 MM HG: CPT | Performed by: INTERNAL MEDICINE

## 2023-12-06 PROCEDURE — 1036F TOBACCO NON-USER: CPT | Performed by: INTERNAL MEDICINE

## 2023-12-06 RX ORDER — PREGABALIN 50 MG/1
50 CAPSULE ORAL 2 TIMES DAILY
Qty: 180 CAPSULE | Refills: 1 | Status: SHIPPED | OUTPATIENT
Start: 2023-12-06 | End: 2024-06-03

## 2023-12-06 RX ORDER — ESTRADIOL 0.1 MG/G
CREAM VAGINAL
Qty: 42.5 G | Refills: 5 | Status: SHIPPED | OUTPATIENT
Start: 2023-12-06

## 2023-12-06 RX ORDER — LORAZEPAM 2 MG/1
2 TABLET ORAL EVERY 6 HOURS PRN
Qty: 180 TABLET | Refills: 1 | Status: SHIPPED | OUTPATIENT
Start: 2023-12-06 | End: 2024-06-03

## 2023-12-06 ASSESSMENT — ENCOUNTER SYMPTOMS: BACK PAIN: 1

## 2023-12-06 ASSESSMENT — PATIENT HEALTH QUESTIONNAIRE - PHQ9: DEPRESSION UNABLE TO ASSESS: URGENT/EMERGENT SITUATION

## 2023-12-06 NOTE — PROGRESS NOTES
1. \"Have you been to the ER, urgent care clinic since your last visit? Hospitalized since your last visit? \" No    2. \"Have you seen or consulted any other health care providers outside of the [address] [place] [address] since your last visit? \" No     3. For patients aged 43-73: Has the patient had a colonoscopy / FIT/ Cologuard? No      If the patient is female:    4. For patients aged 43-66: Has the patient had a mammogram within the past 2 years? No      5. For patients aged 21-65: Has the patient had a pap smear? Yes - Care Gap present.  Most recent result on file
7. Degeneration of lumbar or lumbosacral intervertebral disc  M51.37       8. Trochanteric bursitis of both hips  M70.61     M70.62       9. Medication refill  Z76.0 pregabalin (LYRICA) 50 MG capsule     LORazepam (ATIVAN) 2 MG tablet      10. Encounter for immunization  Z23 Influenza, FLUAD, (age 72 y+), IM, Preservative Free, 0.5 mL           DM has been controlled    BP controlled    ELLIOT--severe and active. Does not see a therapist    Chronic back pain. Off all opiates. Still asks for them. 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  Start estrogen cream    Refills as noted    F/u 6 months for MWV, DM, HTN,

## 2023-12-08 LAB
ALBUMIN SERPL-MCNC: 4.6 G/DL (ref 3.9–4.9)
ALBUMIN/GLOB SERPL: 2 {RATIO} (ref 1.2–2.2)
ALP SERPL-CCNC: 59 IU/L (ref 44–121)
ALT SERPL-CCNC: 9 IU/L (ref 0–32)
AST SERPL-CCNC: 12 IU/L (ref 0–40)
BILIRUB SERPL-MCNC: <0.2 MG/DL (ref 0–1.2)
BUN SERPL-MCNC: 15 MG/DL (ref 8–27)
BUN/CREAT SERPL: 12 (ref 12–28)
CALCIUM SERPL-MCNC: 9.2 MG/DL (ref 8.7–10.3)
CHLORIDE SERPL-SCNC: 108 MMOL/L (ref 96–106)
CHOLEST SERPL-MCNC: 107 MG/DL (ref 100–199)
CO2 SERPL-SCNC: 17 MMOL/L (ref 20–29)
CREAT SERPL-MCNC: 1.27 MG/DL (ref 0.57–1)
EGFRCR SERPLBLD CKD-EPI 2021: 47 ML/MIN/1.73
GLOBULIN SER CALC-MCNC: 2.3 G/DL (ref 1.5–4.5)
GLUCOSE SERPL-MCNC: 127 MG/DL (ref 70–99)
HBA1C MFR BLD: 6.8 % (ref 4.8–5.6)
HDLC SERPL-MCNC: 34 MG/DL
LDLC SERPL CALC-MCNC: 54 MG/DL (ref 0–99)
POTASSIUM SERPL-SCNC: 4.6 MMOL/L (ref 3.5–5.2)
PROT SERPL-MCNC: 6.9 G/DL (ref 6–8.5)
SODIUM SERPL-SCNC: 143 MMOL/L (ref 134–144)
SPECIMEN STATUS REPORT: NORMAL
TRIGL SERPL-MCNC: 100 MG/DL (ref 0–149)
VLDLC SERPL CALC-MCNC: 19 MG/DL (ref 5–40)

## 2024-04-04 ENCOUNTER — TELEPHONE (OUTPATIENT)
Facility: CLINIC | Age: 64
End: 2024-04-04

## 2024-04-04 DIAGNOSIS — Z76.0 MEDICATION REFILL: ICD-10-CM

## 2024-04-04 RX ORDER — LORAZEPAM 2 MG/1
2 TABLET ORAL EVERY 6 HOURS PRN
Qty: 180 TABLET | Refills: 1 | Status: SHIPPED | OUTPATIENT
Start: 2024-04-04 | End: 2024-10-01

## 2024-04-04 NOTE — TELEPHONE ENCOUNTER
----- Message from Anson Laci sent at 4/3/2024  2:01 PM EDT -----  Subject: Refill Request    QUESTIONS  Name of Medication? LORazepam (ATIVAN) 2 MG tablet  Patient-reported dosage and instructions? 2 mg  How many days do you have left? 5  Preferred Pharmacy? [facility] #[id number]  Pharmacy phone number (if available)? [phone]  ---------------------------------------------------------------------------  --------------  CALL BACK INFO  What is the best way for the office to contact you? OK to leave message on   voicemail  Preferred Call Back Phone Number? [phone]  ---------------------------------------------------------------------------  --------------  SCRIPT ANSWERS  Relationship to Patient? Self

## 2024-04-04 NOTE — TELEPHONE ENCOUNTER
Orders Placed This Encounter    LORazepam (ATIVAN) 2 MG tablet     Sig: Take 1 tablet by mouth every 6 hours as needed for Anxiety for up to 180 days. Max Daily Amount: 8 mg     Dispense:  180 tablet     Refill:  1

## 2024-05-13 DIAGNOSIS — Z76.0 MEDICATION REFILL: ICD-10-CM

## 2024-05-13 RX ORDER — LORAZEPAM 2 MG/1
TABLET ORAL
Qty: 120 TABLET | Refills: 5 | Status: SHIPPED | OUTPATIENT
Start: 2024-05-13 | End: 2024-11-09

## 2024-05-28 NOTE — TELEPHONE ENCOUNTER
----- Message from Vivi Andrew sent at 5/28/2024 10:46 AM EDT -----  Subject: Refill Request    QUESTIONS  Name of Medication? topiramate (TOPAMAX) 200 MG tablet  Patient-reported dosage and instructions? 2 per day   How many days do you have left? 0  Preferred Pharmacy? [facility] #[id number]  Pharmacy phone number (if available)? [phone]  Additional Information for Provider? 90 day supply Sarah has an appt   on 7-2-2024 She had to cancel her appt on 5- since she pulled her   back out   ---------------------------------------------------------------------------  --------------  CALL BACK INFO  What is the best way for the office to contact you? OK to leave message on   voicemail  Preferred Call Back Phone Number? [phone]  ---------------------------------------------------------------------------  --------------  SCRIPT ANSWERS  Relationship to Patient? Self   Dear Ms. Shaka Snow,    Please review the enclosed prep instructions for your procedure with Brandt Lay MD.  COLONOSCOPY INSTRUCTIONS - MORNING PROCEDURE  Brandt Lay MD     A sedative will be given to you for the exam.  A responsible adult 18 years of age or older must accompany you from the hospital the day of your exam.  You may not leave by yourself or drive yourself home.  You may not drive for the rest of the day or return to work.  · If you do not have a  who is a responsible adult and who is 18 years of age or older, your appointment will be cancelled.   · You may take Tylenol (acetaminophen). Do not take aspirin the day of procedure.  · If you are diabetic, please see special instructions sheet.  · If you take blood thinners (Coumadin, Warfarin, Plavix), see special instructions sheet.  · You need to call your insurance company to verify coverage for your procedure.   · Medicare and state insurances do NOT need to verify coverage for your procedure.     WHAT YOU NEED TO DO: Please pick-up a Nulytely Prep Kit on 4/29/21 at the pharmacy your physician sent the prescription to.    DAY BEFORE EXAMINATION: ON Wednesday , 5/5/21  • Mix PREP according to instructions and refrigerate.  • DO NOT EAT ANY SOLID FOOD ALL DAY.   • You may drink clear liquids, such as soda, clear fruit juice, coffee or tea without milk products, beef or chicken broth, Popsicles,Gatore Estefany, Propell, Jell-O, and hard candy.  Avoid anything with RED coloring.  We encourage you to drink a lot of fluids for couple of days prior to procedure.  • Begin drinking the solution at 3:00 p.m (No later than 6:00pm).  Drink an 8-ounce glass every 15-20 minutes.  It is best to drink the whole glass rapidly rather than sipping small amounts. May use a straw.  • Continue drinking until the bottle is empty.  It usually takes 3 to 5 hours to completely drink the bottle, so give yourself plenty of time.  • You may drink clear liquids or suck on  hard candy or Popsicles while drinking the Prep.  • Some people feel full or bloated after about 90 minutes of drinking the Prep. This disappears with time, especially when you start passing stool.  If you feel bloated, stop drinking for about 30-45 minutes and see if you can pass some stool. The problem should resolve and you should be able to start drinking again.  If you still have problems, call us for instructions.    DAY OF EXAMINATION:  • Do not eat or drink anything after midnight the night before your exam.  • Take your regular blood pressure and heart medications on the morning of the test with a sip of water.    If you have any questions regarding these instructions, please call [phone].    Thank you for entrusting your care to [facility]

## 2024-06-19 RX ORDER — ISOPROPYL ALCOHOL 70 ML/100ML
SWAB TOPICAL
Qty: 200 EACH | Refills: 3 | Status: SHIPPED | OUTPATIENT
Start: 2024-06-19

## 2024-06-21 RX ORDER — PROMETHAZINE HYDROCHLORIDE 25 MG/1
25 TABLET ORAL EVERY 6 HOURS PRN
Qty: 60 TABLET | Refills: 5 | Status: SHIPPED | OUTPATIENT
Start: 2024-06-21

## 2024-06-21 NOTE — TELEPHONE ENCOUNTER
Last Appointment:  12/6/2023  Future Appointments   Date Time Provider Department Center   7/2/2024  3:30 PM Gay Donovan MD [facility]

## 2024-06-28 ENCOUNTER — TELEPHONE (OUTPATIENT)
Dept: PHARMACY | Facility: CLINIC | Age: 64
End: 2024-06-28

## 2024-06-28 NOTE — TELEPHONE ENCOUNTER
===============================================================================   POPULATION HEALTH CLINICAL PHARMACY: STATIN THERAPY REVIEW  Identified Statin Use In Diabetes care gap per [employer] Records dated:06/28/24     Statin Use in Persons with Diabetes Definition:  Eligible:Members with diabetes ages 40-75 during the measurement year    Definition: Medicare members with diabetes ages 40-75 who receive at least 1 fill of a statin medication in the measurement year   Members with diabetes are defined as those who have at least 2 fills of diabetes medications during the measurement year     Compliance: To comply with this measure, a member with diabetes must have a fill for at least 1 statin or statin combination medication in any strength or dose using their Part D benefit during the measurement year.     Last Visit:  12/6/2023  Next Visit:  7/2/2024    Status in portal:  Y-Adherence-not filled Patient filled 6/18/2024,    Per chart review:   Gap Closed: SIMVASTATIN 40  mg filled 6/18/2024. Report data before patient filled rx.    Allergies   Allergen Reactions    Tizanidine Other (See Comments)     Nausea and worsening headache     LIPID EVALUATION AND GUIDELINE ASSESSMENT  Lab Results   Component Value Date/Time    CHOL 107 12/07/2023 12:00 AM    CHOL 142 09/26/2022 04:36 AM    TRIG 100 12/07/2023 12:00 AM    HDL 34 12/07/2023 12:00 AM    LDL 54 12/07/2023 12:00 AM    VLDL 19 12/07/2023 12:00 AM    VLDL 21 09/26/2022 04:36 AM    ALT 9 12/07/2023 12:00 AM    AST 12 12/07/2023 12:00 AM     The ASCVD Risk score (Cherelle CERDA, et al., 2019) failed to calculate for the following reasons:    The valid total cholesterol range is 130 to 320 mg/dL     ADA 2023/ 2018 ACC Guidelines indicate the patient is a candidate for a moderate-intensity statin.    2018 ACC/AHA/ 2023 ADA Guidelines:  >/= 40-76 yo ; Patient has:Adults 40 to 75 years of age with diabetes mellitus, with LDL > 70 mg/dL (54 mg/dL).   LDL Target goal:

## 2024-08-30 RX ORDER — SIMVASTATIN 40 MG
TABLET ORAL
Qty: 90 TABLET | Refills: 0 | Status: SHIPPED | OUTPATIENT
Start: 2024-08-30

## 2024-08-30 RX ORDER — ATENOLOL 25 MG/1
TABLET ORAL
Qty: 90 TABLET | Refills: 0 | Status: SHIPPED | OUTPATIENT
Start: 2024-08-30

## 2024-09-16 DIAGNOSIS — E11.21 TYPE 2 DIABETES MELLITUS WITH DIABETIC NEPHROPATHY, WITHOUT LONG-TERM CURRENT USE OF INSULIN (HCC): Primary | ICD-10-CM

## 2024-09-20 RX ORDER — METFORMIN HYDROCHLORIDE 750 MG/1
TABLET, EXTENDED RELEASE ORAL
Qty: 90 TABLET | Refills: 0 | Status: SHIPPED | OUTPATIENT
Start: 2024-09-20

## 2024-11-11 RX ORDER — CALCIUM CITRATE/VITAMIN D3 200MG-6.25
TABLET ORAL
Qty: 100 STRIP | Refills: 0 | Status: SHIPPED | OUTPATIENT
Start: 2024-11-11

## 2024-11-11 RX ORDER — ATENOLOL 25 MG/1
TABLET ORAL
Qty: 90 TABLET | Refills: 0 | Status: SHIPPED | OUTPATIENT
Start: 2024-11-11

## 2024-11-11 RX ORDER — GLUCOSAM/CHON-MSM1/C/MANG/BOSW 500-416.6
TABLET ORAL
Qty: 100 EACH | Refills: 0 | Status: SHIPPED | OUTPATIENT
Start: 2024-11-11

## 2024-11-11 RX ORDER — SIMVASTATIN 40 MG
TABLET ORAL
Qty: 90 TABLET | Refills: 0 | Status: SHIPPED | OUTPATIENT
Start: 2024-11-11

## 2024-11-11 NOTE — TELEPHONE ENCOUNTER
Chart reviewed. Pt no show 10/10/24 appt. Call placed and spoke with the pt regarding appt needed. Pt is agreeable and scheduled for 01/08/25, the next available. Pt is requesting medications:    Requested Prescriptions     Pending Prescriptions Disp Refills    atenolol (TENORMIN) 25 MG tablet [Pharmacy Med Name: Atenolol Oral Tablet 25 MG] 90 tablet 3     Sig: TAKE 1 TABLET EVERY DAY    simvastatin (ZOCOR) 40 MG tablet [Pharmacy Med Name: Simvastatin Oral Tablet 40 MG] 90 tablet 3     Sig: TAKE 1 TABLET EVERY NIGHT AT BEDTIME FOR HIGH CHOLESTEROL    blood glucose test strips (TRUE METRIX BLOOD GLUCOSE TEST) strip [Pharmacy Med Name: True Metrix Blood Glucose Test In Vitro Strip] 100 strip 3     Sig: TEST BLOOD SUGAR ONE TIME DAILY    TRUEplus Lancets 28G MISC [Pharmacy Med Name: TRUEplus Lancets 28G Miscellaneous] 100 each 3     Sig: TEST BLOOD SUGAR ONE TIME DAILY     PCP notified.

## 2024-11-26 ENCOUNTER — COMMUNITY OUTREACH (OUTPATIENT)
Facility: CLINIC | Age: 64
End: 2024-11-26

## 2024-12-04 DIAGNOSIS — E11.21 TYPE 2 DIABETES MELLITUS WITH DIABETIC NEPHROPATHY, WITHOUT LONG-TERM CURRENT USE OF INSULIN (HCC): ICD-10-CM

## 2024-12-04 RX ORDER — DULOXETIN HYDROCHLORIDE 30 MG/1
30 CAPSULE, DELAYED RELEASE ORAL DAILY
Qty: 90 CAPSULE | Refills: 0 | Status: SHIPPED | OUTPATIENT
Start: 2024-12-04

## 2024-12-04 RX ORDER — METFORMIN HYDROCHLORIDE 750 MG/1
TABLET, EXTENDED RELEASE ORAL
Qty: 30 TABLET | Refills: 1 | Status: SHIPPED | OUTPATIENT
Start: 2024-12-04

## 2024-12-04 NOTE — TELEPHONE ENCOUNTER
Last Appointment:  12/6/2023  Future Appointments   Date Time Provider Department Center   1/8/2025  2:45 PM Gay Donovan MD [facility] DE[facility]

## 2024-12-04 NOTE — TELEPHONE ENCOUNTER
Last Appointment:  Visit date not found  Future Appointments   Date Time Provider Department Center   1/8/2025  2:45 PM Gay Donovan MD [facility]

## 2024-12-17 DIAGNOSIS — E11.21 TYPE 2 DIABETES MELLITUS WITH DIABETIC NEPHROPATHY, WITHOUT LONG-TERM CURRENT USE OF INSULIN (HCC): ICD-10-CM

## 2024-12-17 RX ORDER — METFORMIN HYDROCHLORIDE 750 MG/1
750 TABLET, EXTENDED RELEASE ORAL DAILY
Qty: 30 TABLET | Refills: 1 | Status: SHIPPED | OUTPATIENT
Start: 2024-12-17

## 2024-12-21 DIAGNOSIS — Z76.0 MEDICATION REFILL: ICD-10-CM

## 2024-12-23 RX ORDER — LORAZEPAM 2 MG/1
TABLET ORAL
Qty: 120 TABLET | Refills: 1 | Status: SHIPPED | OUTPATIENT
Start: 2024-12-23 | End: 2025-06-21

## 2025-01-23 RX ORDER — ATENOLOL 25 MG/1
TABLET ORAL
Qty: 90 TABLET | Refills: 3 | OUTPATIENT
Start: 2025-01-23

## 2025-01-23 RX ORDER — CALCIUM CITRATE/VITAMIN D3 200MG-6.25
TABLET ORAL
Qty: 100 STRIP | Refills: 3 | OUTPATIENT
Start: 2025-01-23

## 2025-01-23 RX ORDER — SIMVASTATIN 40 MG
TABLET ORAL
Qty: 90 TABLET | Refills: 3 | OUTPATIENT
Start: 2025-01-23

## 2025-01-23 RX ORDER — GLUCOSAM/CHON-MSM1/C/MANG/BOSW 500-416.6
TABLET ORAL
Qty: 100 EACH | Refills: 3 | OUTPATIENT
Start: 2025-01-23

## 2025-02-10 DIAGNOSIS — Z76.0 MEDICATION REFILL: ICD-10-CM

## 2025-02-10 RX ORDER — LORAZEPAM 2 MG/1
TABLET ORAL
Qty: 120 TABLET | OUTPATIENT
Start: 2025-02-10

## 2025-02-10 NOTE — TELEPHONE ENCOUNTER
Patient called in to find out why this was denied.  Advised since she was discharged from the practice and it has been past the 30 days per the letter Dr. Donovan will not longer be able to fill and prescriptions for her.  Patient states what is she supposed to do with ut the medication?  What if it causes her to have another stroke or a heart attack.  Advised the patient she needs to go to an Urgent care or the  ED in the interim of find a new PCP.

## 2025-02-16 RX ORDER — DULOXETIN HYDROCHLORIDE 30 MG/1
30 CAPSULE, DELAYED RELEASE ORAL DAILY
Qty: 90 CAPSULE | Refills: 3 | OUTPATIENT
Start: 2025-02-16

## 2025-02-19 PROBLEM — F13.939 BENZODIAZEPINE WITHDRAWAL WITH COMPLICATION (HCC): Status: ACTIVE | Noted: 2025-02-19

## 2025-02-25 ENCOUNTER — CARE COORDINATION (OUTPATIENT)
Facility: CLINIC | Age: 65
End: 2025-02-25

## 2025-02-25 DIAGNOSIS — G89.4 CHRONIC PAIN SYNDROME: ICD-10-CM

## 2025-02-25 DIAGNOSIS — M54.81 BILATERAL OCCIPITAL NEURALGIA: ICD-10-CM

## 2025-02-25 DIAGNOSIS — N18.30 STAGE 3 CHRONIC KIDNEY DISEASE, UNSPECIFIED WHETHER STAGE 3A OR 3B CKD (HCC): ICD-10-CM

## 2025-02-25 DIAGNOSIS — F13.939 BENZODIAZEPINE WITHDRAWAL WITH COMPLICATION (HCC): ICD-10-CM

## 2025-02-25 DIAGNOSIS — G43.919 INTRACTABLE MIGRAINE WITHOUT STATUS MIGRAINOSUS, UNSPECIFIED MIGRAINE TYPE: ICD-10-CM

## 2025-02-25 DIAGNOSIS — M43.16 SPONDYLOLISTHESIS OF LUMBAR REGION: ICD-10-CM

## 2025-02-25 DIAGNOSIS — E78.5 DYSLIPIDEMIA: ICD-10-CM

## 2025-02-25 DIAGNOSIS — F32.A DEPRESSION, UNSPECIFIED DEPRESSION TYPE: ICD-10-CM

## 2025-02-25 DIAGNOSIS — E11.21 TYPE 2 DIABETES WITH NEPHROPATHY (HCC): ICD-10-CM

## 2025-02-25 DIAGNOSIS — M15.9 GENERALIZED OA: ICD-10-CM

## 2025-02-25 DIAGNOSIS — M79.7 FIBROMYALGIA: ICD-10-CM

## 2025-02-25 DIAGNOSIS — I10 ESSENTIAL HYPERTENSION: Primary | ICD-10-CM

## 2025-02-25 DIAGNOSIS — M54.32 BILATERAL SCIATICA: ICD-10-CM

## 2025-02-25 DIAGNOSIS — M51.379 DEGENERATION OF INTERVERTEBRAL DISC OF LUMBOSACRAL REGION, UNSPECIFIED WHETHER PAIN PRESENT: ICD-10-CM

## 2025-02-25 DIAGNOSIS — M54.31 BILATERAL SCIATICA: ICD-10-CM

## 2025-02-25 NOTE — CARE COORDINATION
Care Transitions Note    Initial Call - Call within 2 business days of discharge: Yes    Patient Current Location:  [place]    Care Transition Nurse contacted the patient by telephone to perform post hospital discharge assessment, verified name and  as identifiers. Provided introduction to self, and explanation of the Care Transition Nurse role.     Patient: Sarah Bedoya    Patient : 1960   MRN: [id number]    Reason for Admission: Benzodiazepine Withdrawal and ESCOBAR on CKD  Discharge Date: 25  RURS: Readmission Risk Score: 9.5      Last Discharge Facility       Date Complaint Diagnosis Description Type Department Provider    25 Abdominal Pain; Jaw Swelling Acute cystitis without hematuria ... ED to Hosp-Admission (Discharged) (ADMITTED) [id number] Chuck Hernandez MD; Kiera Evans...            Was this an external facility discharge? Yes. Discharge Date: 25. Facility Name: [facility]    Additional needs identified to be addressed with provider   No needs identified             Method of communication with provider: none.    Patients top risk factors for readmission: medical condition-benzodiazepine withdrawal, CKD and PCP relationship    Interventions to address risk factors:   Review of patient management of conditions/medications: Patient demonstrated understanding of reason for admission, plan of care, and some of her new medications. Reports she received IV abx for a UTI, although notes indicate urine culture was likely contaminated. CTN reviewed reason for taking propanolol, a new Rx. Patient states she was discharged from Dr. Donovan's care and would like to establish care with a new PCP; CTN offered assistance and she agreed to establish care at [facility]. Patient states a plan to schedule an appt with her pain management specialist. Advised patient if she runs out of Rxs prior to her new PCP appt, she will need to go to the ED to

## 2025-02-26 ENCOUNTER — CARE COORDINATION (OUTPATIENT)
Dept: CARE COORDINATION | Age: 65
End: 2025-02-26

## 2025-02-26 ENCOUNTER — CARE COORDINATION (OUTPATIENT)
Facility: CLINIC | Age: 65
End: 2025-02-26

## 2025-02-26 NOTE — CARE COORDINATION
Care Transitions Note    Follow Up Call     Patient Current Location:  Home: [address]  [place] [address]    Care Transition Nurse contacted the patient by telephone. Verified name and  as identifiers. She requested to keep the call brief.    Additional needs identified to be addressed with provider   No needs identified                 Method of communication with provider: none.    Care Summary Note: Patient states she is tired and has been trying to nap today, but has received 16 calls since 9 am and keeps getting woken up. Reports she still has no appetite and CTN reiterated that the RD may be able to offer some suggestions.    Plan of care updates since last contact:  Referrals: CARLOS Posada notified CTN that she spoke to the patient and patient declined her services. Swetha informed CTN she recommends Glucerna and has coupons that she can send the patient is patient is agreeable; if patient is unable to tolerate Glucerna, she suggested [employer]. CTN contacted the patient and reminded her of the referral to the RD that was discussed yesterday. Patient states she will call Swetha when she's ready. CTN will discuss coupons on next outreach.        Advance Care Planning:   Does patient have an Advance Directive: deferred at this time, will discuss on future follow up. .    Medication Review:  Full medication reconciliation completed during previous call. and No changes since last call.     Remote Patient Monitoring:  Offered patient enrollment in the Remote Patient Monitoring (RPM) program for in-home monitoring: Patient is not eligible for RPM program because: she is in between establishing care with a new PCP .    Assessments:  No changes since last call    Follow Up Appointment:   Future Appointments         Provider Specialty Dept Phone    2025 1:40 PM Francisca Vásquez MD Family Medicine [phone]            Care Transition Nurse provided contact information.  Plan for

## 2025-02-26 NOTE — CARE COORDINATION
RD received referral from CTNRosa Maria:   Referral to RD for unintentional weight loss and poor appetite/intolerance of most foods r/t benzodiazepine withdrawal. Starting weight ~156 lb 2 weeks ago, current weight 133 lb. Strong aversion to smells of foods, limiting intake to small portions of fruit, yogurt, and saltines. Thank you!       RD contacted Sarah Bedoya regarding Dietitian referral. Pt answered, RD explained reason for call and role in care. Patient declined nutrition assessment/education at this time. RD notified CTN. No additional outreaches scheduled at this time. RD will follow up as appropriate.       Swetha Hubbard RDN, LD  [phone]

## 2025-03-04 ENCOUNTER — CARE COORDINATION (OUTPATIENT)
Facility: CLINIC | Age: 65
End: 2025-03-04

## 2025-03-04 NOTE — CARE COORDINATION
Provider Specialty Dept Phone    4/18/2025 1:40 PM Francisca Vásquez MD Family Medicine [phone]            Care Transition Nurse provided contact information.  Plan for follow-up call in 6-10 days based on severity of symptoms and risk factors.  Plan for next call: symptom management-assess for resolution of benzodiazepine withdrawal symptoms/improvement of appetite   community resources-confirm if [employer] is able to assist patient with housing, confirm transportation for her upcoming appt  referrals-confirm if home health PT/OT will be following the patient  ACP discussion      Rosa Maria Montoya RN

## 2025-03-11 ENCOUNTER — CARE COORDINATION (OUTPATIENT)
Facility: CLINIC | Age: 65
End: 2025-03-11

## 2025-03-11 NOTE — CARE COORDINATION
Care Transitions Note    Follow Up Call     Patient Current Location:  Home: [address]  [place] [address]    Care Transition Nurse contacted the patient by telephone. Verified name and  as identifiers.    Additional needs identified to be addressed with provider   No needs identified                 Method of communication with provider: none.    Care Summary Note: Patient reports she still has headaches, chest tightness, and inability to eat much which has been ongoing since her recent admission. She had some saltines this morning and chicken and rice soup for lunch. She had a small baked potato for breakfast one day. She received 14 prepackaged meals from [employer] and ate one of the breakfast meals, but had significant diarrhea. Sleeping only 2-3 hours per night. A few days ago BP was 156/82.    She spoke to a  from [employer] today and discussed her high level of anxiety, and said she will set patient up with someone who can help her manage her medications. Patient is in process of getting the contact information for an apartment complex of which she's on the waiting list. She discussed her housing and financial concerns with [employer]. She has been approved for Medicaid.    Plan of care updates since last contact:  Community Resources: Confirmed patient spoke to a [employer]  re: financial and housing concerns.  Referrals: Offered a referral to the RD again, but patient declined.   Assistance with establishing care with a PCP: Patient's appt scheduled to establish care with Dr. Vásquez was canceled and patient was not notified. CTN contacted [facility], verified appt was canceled due to provider will be out of office, and rescheduled appt.       Advance Care Planning:   Does patient have an Advance Directive: deferred at this time, will discuss on future follow up. .    Medication Review:  Full medication reconciliation completed during previous call. and

## 2025-03-12 ENCOUNTER — CARE COORDINATION (OUTPATIENT)
Facility: CLINIC | Age: 65
End: 2025-03-12

## 2025-03-12 NOTE — CARE COORDINATION
Care Transitions Note    Follow Up Call     Patient Current Location:  Home: [address]  [address]  [place] [address]    Care Transition Nurse contacted the patient by telephone. Verified name and  as identifiers.    Additional needs identified to be addressed with provider   No needs identified                 Method of communication with provider: none.      Plan of care updates since last contact:  Community Resources: Patient states a [facility]  or , Mica, contacted her this morning and will call back this afternoon.  Transportation: CTN contacted [employer] and verified patient has medical transportation benefits through [employer]. They were not able to provide details on # of trips per year that are covered. CTN notified patient of benefit and provided contact info to schedule a ride with [employer], as well as instructions to call today due to requiring 7 days notice. Confirmed she has [employer] contact info/address and appt details. She will call today to schedule the appt.       Advance Care Planning:   Does patient have an Advance Directive: deferred at this time, will discuss on future follow up. .    Medication Review:  Full medication reconciliation completed during previous call. and No changes since last call.     Remote Patient Monitoring:  Offered patient enrollment in the Remote Patient Monitoring (RPM) program for in-home monitoring: Patient is not eligible for RPM program because: she does not have a PCP at this time .    Assessments:  Care Transitions Subsequent and Final Call    Subsequent and Final Calls  Are you currently active with any services?: Home Health  Identified Barriers: Lack of Support, Financial, Stress  Care Transitions Interventions     Transportation Support: Completed     Registered Dietician: Declined      Social Work: Declined    Other Interventions:              Follow Up Appointment:   Reviewed upcoming

## 2025-03-19 ENCOUNTER — OFFICE VISIT (OUTPATIENT)
Facility: CLINIC | Age: 65
End: 2025-03-19
Payer: MEDICARE

## 2025-03-19 VITALS
TEMPERATURE: 98 F | HEIGHT: 60 IN | RESPIRATION RATE: 18 BRPM | HEART RATE: 94 BPM | WEIGHT: 130 LBS | SYSTOLIC BLOOD PRESSURE: 116 MMHG | BODY MASS INDEX: 25.52 KG/M2 | OXYGEN SATURATION: 95 % | DIASTOLIC BLOOD PRESSURE: 77 MMHG

## 2025-03-19 DIAGNOSIS — E78.2 MIXED HYPERLIPIDEMIA: ICD-10-CM

## 2025-03-19 DIAGNOSIS — Z12.31 ENCOUNTER FOR SCREENING MAMMOGRAM FOR BREAST CANCER: ICD-10-CM

## 2025-03-19 DIAGNOSIS — I10 ESSENTIAL HYPERTENSION: ICD-10-CM

## 2025-03-19 DIAGNOSIS — I10 PRIMARY HYPERTENSION: ICD-10-CM

## 2025-03-19 DIAGNOSIS — E53.8 B12 DEFICIENCY: ICD-10-CM

## 2025-03-19 DIAGNOSIS — F13.939 BENZODIAZEPINE WITHDRAWAL WITH COMPLICATION (HCC): Primary | ICD-10-CM

## 2025-03-19 DIAGNOSIS — E11.21 TYPE 2 DIABETES MELLITUS WITH DIABETIC NEPHROPATHY, WITHOUT LONG-TERM CURRENT USE OF INSULIN (HCC): ICD-10-CM

## 2025-03-19 DIAGNOSIS — F41.9 ANXIETY AND DEPRESSION: ICD-10-CM

## 2025-03-19 DIAGNOSIS — Z76.89 ENCOUNTER TO ESTABLISH CARE WITH NEW DOCTOR: ICD-10-CM

## 2025-03-19 DIAGNOSIS — Z78.0 POST-MENOPAUSAL: ICD-10-CM

## 2025-03-19 DIAGNOSIS — R11.0 NAUSEA: ICD-10-CM

## 2025-03-19 DIAGNOSIS — Z12.11 ENCOUNTER FOR SCREENING FOR MALIGNANT NEOPLASM OF COLON: ICD-10-CM

## 2025-03-19 DIAGNOSIS — N18.31 STAGE 3A CHRONIC KIDNEY DISEASE (HCC): ICD-10-CM

## 2025-03-19 DIAGNOSIS — B35.1 TOENAIL FUNGUS: ICD-10-CM

## 2025-03-19 DIAGNOSIS — M79.7 FIBROMYALGIA: ICD-10-CM

## 2025-03-19 DIAGNOSIS — Z76.0 MEDICATION REFILL: ICD-10-CM

## 2025-03-19 DIAGNOSIS — F32.A ANXIETY AND DEPRESSION: ICD-10-CM

## 2025-03-19 DIAGNOSIS — G89.4 CHRONIC PAIN SYNDROME: ICD-10-CM

## 2025-03-19 PROCEDURE — 99214 OFFICE O/P EST MOD 30 MIN: CPT | Performed by: STUDENT IN AN ORGANIZED HEALTH CARE EDUCATION/TRAINING PROGRAM

## 2025-03-19 PROCEDURE — G2211 COMPLEX E/M VISIT ADD ON: HCPCS | Performed by: STUDENT IN AN ORGANIZED HEALTH CARE EDUCATION/TRAINING PROGRAM

## 2025-03-19 PROCEDURE — 1123F ACP DISCUSS/DSCN MKR DOCD: CPT | Performed by: STUDENT IN AN ORGANIZED HEALTH CARE EDUCATION/TRAINING PROGRAM

## 2025-03-19 PROCEDURE — 3078F DIAST BP <80 MM HG: CPT | Performed by: STUDENT IN AN ORGANIZED HEALTH CARE EDUCATION/TRAINING PROGRAM

## 2025-03-19 PROCEDURE — 1125F AMNT PAIN NOTED PAIN PRSNT: CPT | Performed by: STUDENT IN AN ORGANIZED HEALTH CARE EDUCATION/TRAINING PROGRAM

## 2025-03-19 PROCEDURE — 3044F HG A1C LEVEL LT 7.0%: CPT | Performed by: STUDENT IN AN ORGANIZED HEALTH CARE EDUCATION/TRAINING PROGRAM

## 2025-03-19 PROCEDURE — 1159F MED LIST DOCD IN RCRD: CPT | Performed by: STUDENT IN AN ORGANIZED HEALTH CARE EDUCATION/TRAINING PROGRAM

## 2025-03-19 PROCEDURE — 3074F SYST BP LT 130 MM HG: CPT | Performed by: STUDENT IN AN ORGANIZED HEALTH CARE EDUCATION/TRAINING PROGRAM

## 2025-03-19 RX ORDER — CICLOPIROX 80 MG/ML
SOLUTION TOPICAL
Qty: 6.6 DEVICE | Refills: 0 | Status: SHIPPED | OUTPATIENT
Start: 2025-03-19

## 2025-03-19 RX ORDER — ONDANSETRON 4 MG/1
4 TABLET, FILM COATED ORAL DAILY PRN
Qty: 30 TABLET | Refills: 0 | Status: SHIPPED | OUTPATIENT
Start: 2025-03-19 | End: 2025-03-19

## 2025-03-19 RX ORDER — LORAZEPAM 1 MG/1
1 TABLET ORAL EVERY 8 HOURS PRN
Qty: 30 TABLET | Refills: 0 | Status: SHIPPED | OUTPATIENT
Start: 2025-03-19 | End: 2025-03-19

## 2025-03-19 RX ORDER — ONDANSETRON 4 MG/1
4 TABLET, FILM COATED ORAL DAILY PRN
Qty: 30 TABLET | Refills: 0 | Status: SHIPPED | OUTPATIENT
Start: 2025-03-19

## 2025-03-19 RX ORDER — METFORMIN HYDROCHLORIDE 750 MG/1
750 TABLET, EXTENDED RELEASE ORAL
Qty: 30 TABLET | Refills: 3 | Status: SHIPPED | OUTPATIENT
Start: 2025-03-19

## 2025-03-19 RX ORDER — CICLOPIROX 80 MG/ML
SOLUTION TOPICAL
Qty: 6 ML | Refills: 0 | Status: SHIPPED | OUTPATIENT
Start: 2025-03-19 | End: 2025-03-19

## 2025-03-19 RX ORDER — LORAZEPAM 1 MG/1
1 TABLET ORAL EVERY 8 HOURS PRN
Qty: 30 TABLET | Refills: 0 | Status: SHIPPED | OUTPATIENT
Start: 2025-03-19 | End: 2025-09-15

## 2025-03-19 RX ORDER — METFORMIN HYDROCHLORIDE 750 MG/1
750 TABLET, EXTENDED RELEASE ORAL
Qty: 30 TABLET | Refills: 3 | Status: SHIPPED | OUTPATIENT
Start: 2025-03-19 | End: 2025-03-19

## 2025-03-19 SDOH — ECONOMIC STABILITY: FOOD INSECURITY: WITHIN THE PAST 12 MONTHS, YOU WORRIED THAT YOUR FOOD WOULD RUN OUT BEFORE YOU GOT MONEY TO BUY MORE.: NEVER TRUE

## 2025-03-19 SDOH — ECONOMIC STABILITY: FOOD INSECURITY: WITHIN THE PAST 12 MONTHS, THE FOOD YOU BOUGHT JUST DIDN'T LAST AND YOU DIDN'T HAVE MONEY TO GET MORE.: NEVER TRUE

## 2025-03-19 ASSESSMENT — PATIENT HEALTH QUESTIONNAIRE - PHQ9
8. MOVING OR SPEAKING SO SLOWLY THAT OTHER PEOPLE COULD HAVE NOTICED. OR THE OPPOSITE, BEING SO FIGETY OR RESTLESS THAT YOU HAVE BEEN MOVING AROUND A LOT MORE THAN USUAL: SEVERAL DAYS
7. TROUBLE CONCENTRATING ON THINGS, SUCH AS READING THE NEWSPAPER OR WATCHING TELEVISION: SEVERAL DAYS
1. LITTLE INTEREST OR PLEASURE IN DOING THINGS: NOT AT ALL
9. THOUGHTS THAT YOU WOULD BE BETTER OFF DEAD, OR OF HURTING YOURSELF: NOT AT ALL
SUM OF ALL RESPONSES TO PHQ QUESTIONS 1-9: 7
SUM OF ALL RESPONSES TO PHQ QUESTIONS 1-9: 7
5. POOR APPETITE OR OVEREATING: SEVERAL DAYS
SUM OF ALL RESPONSES TO PHQ QUESTIONS 1-9: 7
2. FEELING DOWN, DEPRESSED OR HOPELESS: SEVERAL DAYS
4. FEELING TIRED OR HAVING LITTLE ENERGY: SEVERAL DAYS
10. IF YOU CHECKED OFF ANY PROBLEMS, HOW DIFFICULT HAVE THESE PROBLEMS MADE IT FOR YOU TO DO YOUR WORK, TAKE CARE OF THINGS AT HOME, OR GET ALONG WITH OTHER PEOPLE: SOMEWHAT DIFFICULT
3. TROUBLE FALLING OR STAYING ASLEEP: SEVERAL DAYS
SUM OF ALL RESPONSES TO PHQ QUESTIONS 1-9: 7
6. FEELING BAD ABOUT YOURSELF - OR THAT YOU ARE A FAILURE OR HAVE LET YOURSELF OR YOUR FAMILY DOWN: SEVERAL DAYS

## 2025-03-19 ASSESSMENT — ENCOUNTER SYMPTOMS
ABDOMINAL DISTENTION: 0
NAUSEA: 0
CHEST TIGHTNESS: 0
SHORTNESS OF BREATH: 0
ABDOMINAL PAIN: 0
DIARRHEA: 0
RHINORRHEA: 0
VOMITING: 0
SORE THROAT: 0

## 2025-03-19 ASSESSMENT — ANXIETY QUESTIONNAIRES
1. FEELING NERVOUS, ANXIOUS, OR ON EDGE: NEARLY EVERY DAY
IF YOU CHECKED OFF ANY PROBLEMS ON THIS QUESTIONNAIRE, HOW DIFFICULT HAVE THESE PROBLEMS MADE IT FOR YOU TO DO YOUR WORK, TAKE CARE OF THINGS AT HOME, OR GET ALONG WITH OTHER PEOPLE: EXTREMELY DIFFICULT
GAD7 TOTAL SCORE: 21
4. TROUBLE RELAXING: NEARLY EVERY DAY
2. NOT BEING ABLE TO STOP OR CONTROL WORRYING: NEARLY EVERY DAY
5. BEING SO RESTLESS THAT IT IS HARD TO SIT STILL: NEARLY EVERY DAY
3. WORRYING TOO MUCH ABOUT DIFFERENT THINGS: NEARLY EVERY DAY
6. BECOMING EASILY ANNOYED OR IRRITABLE: NEARLY EVERY DAY
7. FEELING AFRAID AS IF SOMETHING AWFUL MIGHT HAPPEN: NEARLY EVERY DAY

## 2025-03-19 NOTE — PROGRESS NOTES
\"Have you been to the ER, urgent care clinic since your last visit?  Hospitalized since your last visit?\"    YES - When: approximately 1 months ago.  Where and Why: Pt went to the ED last month for chest pains an diarrhea .    “Have you seen or consulted any other health care providers outside our system since your last visit?”    NO    Have you had a mammogram?”   NO    No breast cancer screening on file       “Have you had a colorectal cancer screening such as a colonoscopy/FIT/Cologuard?    YES - Type: Cologuard     Date of last Colonoscopy: 1/20/2012  No cologuard on file  Date of last FIT: 6/27/2022   No flexible sigmoidoscopy on file     “Have you had a diabetic eye exam?”    YES - Where: Pt had an eye exam at least two years ago. Nurse/CMA to request most recent records if not in the chart     Date of last diabetic eye exam: 6/19/2015           
No Food Insecurity (3/19/2025)    Hunger Vital Sign     Worried About Running Out of Food in the Last Year: Never true     Ran Out of Food in the Last Year: Never true   Recent Concern: Food Insecurity - Food Insecurity Present (2/19/2025)    Hunger Vital Sign     Worried About Running Out of Food in the Last Year: Sometimes true     Ran Out of Food in the Last Year: Sometimes true   Transportation Needs: No Transportation Needs (3/19/2025)    PRAPARE - Transportation     Lack of Transportation (Medical): No     Lack of Transportation (Non-Medical): No   Recent Concern: Transportation Needs - Unmet Transportation Needs (2/19/2025)    PRAPARE - Transportation     Lack of Transportation (Medical): Yes     Lack of Transportation (Non-Medical): Yes   Physical Activity: Inactive (5/10/2023)    Exercise Vital Sign     Days of Exercise per Week: 0 days     Minutes of Exercise per Session: 0 min   Stress: Not on file   Social Connections: Not on file   Intimate Partner Violence: Not on file   Housing Stability: Low Risk  (3/19/2025)    Housing Stability Vital Sign     Unable to Pay for Housing in the Last Year: No     Number of Times Moved in the Last Year: 0     Homeless in the Last Year: No   Recent Concern: Housing Stability - High Risk (2/19/2025)    Housing Stability Vital Sign     Unable to Pay for Housing in the Last Year: Yes     Number of Times Moved in the Last Year: 0     Homeless in the Last Year: No       Current Outpatient Medications   Medication Sig Dispense Refill    ciclopirox (PENLAC) 8 % solution Apply topically nightly. 6.6 Device 0    cyanocobalamin ([employer] VITAMIN B12) 1000 MCG tablet Take 1 tablet by mouth daily 30 tablet 3    LORazepam (ATIVAN) 1 MG tablet Take 1 tablet by mouth every 8 hours as needed for Anxiety for up to 180 days. Max Daily Amount: 3 mg 30 tablet 0    metFORMIN (GLUCOPHAGE-XR) 750 MG extended release tablet Take 1 tablet by mouth daily (with breakfast) 30 tablet 3    ondansetron

## 2025-03-20 ENCOUNTER — CARE COORDINATION (OUTPATIENT)
Facility: CLINIC | Age: 65
End: 2025-03-20

## 2025-03-25 ENCOUNTER — CARE COORDINATION (OUTPATIENT)
Facility: CLINIC | Age: 65
End: 2025-03-25

## 2025-03-25 NOTE — CARE COORDINATION
Care Transitions Note    Final Call     Attempted to reach patient for transitions of care follow up.  Unable to reach patient.      Outreach Attempts:   HIPAA compliant voicemail left for patient.     Care Summary Note: Patient attended appointment to establish care with a PCP on 3/19/25. Referrals were placed to psychiatry for benzodiazepine withdrawal, rheumatology for fibromyalgia, and podiatry for toenail fungus. Prescribed a lorazepam taper and Zofran for nausea. Advised to resume metformin 750 mg daily.    Follow Up Appointment:   Future Appointments         Provider Specialty Dept Phone    5/19/2025 2:00 PM Grace Felix MD Family Medicine [phone]            Plan for follow-up call in 2-5 days based on severity of symptoms and risk factors. Plan for next call: follow-up appointment-confirm attendance of pain management appt  community resources-confirm if patient was able to utilize [employer] for transportation to appts, and confirm other resources [facility] for [facility], etc.  referrals-confirm if psychiatry appt has been scheduled  referral to ambulatory care manager-discuss and offer  ACP discussion    Rosa Maria Montoya RN

## 2025-03-27 ENCOUNTER — CARE COORDINATION (OUTPATIENT)
Facility: CLINIC | Age: 65
End: 2025-03-27

## 2025-03-27 NOTE — CARE COORDINATION
Care Transitions Note    Follow Up Call       Patient Current Location:  Home: [address]  [facility] [address]    Care Transition Nurse contacted the patient by telephone. Verified name and  as identifiers.    Additional needs identified to be addressed with provider   None identified             Method of communication with provider: none.    Care Summary Note: Patient attended appointment to establish care with a PCP on 3/19/25. Referrals were placed to psychiatry for benzodiazepine withdrawal, rheumatology for fibromyalgia, and podiatry for toenail fungus. Prescribed a lorazepam taper and Zofran for nausea. Advised to resume metformin 750 mg daily.     Patient confirmed she attended pain management appointment last week, but she was unable to provide a sufficient urine sample for the UDS, so her Rxs were not refilled. She subsequently ran out of Celebrex, gabapentin, pregabalin, and methocarbamol. Rescheduled pain management appointment on Monday. She confirmed she filled Zofran and Ativan, and needs to have someone  metformin. She has an old metformin prescription at home that she's been taking since discharge. Confirmed she's taking Ativan TID and has taken Zofran PRN, but notes it's not effective.    Patient reports she's been up all night with watery diarrhea and is still unable to to eat anything other than chicken noodle soup recently. She took her last two imodium on Tuesday. When asked how long the diarrhea has been going on, she did not directly answer the question. She's had a few normal BM since discharge and then stated she typically takes imodium PRN which is effective. She's been drinking fluids including Powerade and Glucerna. Reports she has not been sleeping well and has had burning with urination since at least last week, but again, patient does not directly answer CTN's questions.    After a lengthy discussion of the above reports concerns, she then stated she thinks

## 2025-03-28 ENCOUNTER — CARE COORDINATION (OUTPATIENT)
Facility: CLINIC | Age: 65
End: 2025-03-28

## 2025-03-28 NOTE — CARE COORDINATION
Care Transitions Note    Final Call     Attempted to reach patient for transitions of care follow up.  Unable to reach patient.      Outreach Attempts:   HIPAA compliant voicemail left for patient.     Patient closed (unable to reach patient) from the Care Transitions program on 3/28/25.    Handoff:   Patient was not referred to the ACM team due to unable to contact patient.      Care Summary Note: CTN contacted [place] [employer] and requested a welfare check on the patient due to c/o stroke-like symptoms, memory deficits, and concern for possible withdrawal from pain medications given diarrhea, poor appetite, poor sleep. Review of records indicates patient was not evaluated in a local ED yesterday, as advised by CTN. A  is en route to patient's home to complete a welfare check.    CTN is sending a letter to patient offering a referral to a [facility] ACM for chronic care management and providing her with the following resources: [employer] contact info, [employer] contact info for scheduling transportation to [facility], and [facility] contact info with advise to schedule appointment ASAP.    CTN confirmed this afternoon that patient is currently in [facility] ED.    Assessments:  Care Transitions Subsequent and Final Call    Subsequent and Final Calls  Are you currently active with any services?: Home Health  Care Transitions Interventions     Other Services: Completed (Comment: Requested welfare check from police on patient 3/28/25.)      Transportation Support: Completed     Registered Dietician: Declined      Social Work: Declined    Other Interventions:              Upcoming Appointments:    Future Appointments         Provider Specialty Dept Phone    3/31/2025 2:30 PM       5/19/2025 2:00 PM Grace Felix MD Family Medicine [phone]          [facility]         [address] [place] [id number]

## 2025-03-31 ENCOUNTER — TELEPHONE (OUTPATIENT)
Facility: CLINIC | Age: 65
End: 2025-03-31

## 2025-03-31 ASSESSMENT — ENCOUNTER SYMPTOMS
ABDOMINAL PAIN: 0
VOMITING: 0
SORE THROAT: 0
ABDOMINAL DISTENTION: 0
NAUSEA: 0
SHORTNESS OF BREATH: 0
RHINORRHEA: 0
CHEST TIGHTNESS: 0
DIARRHEA: 0

## 2025-03-31 NOTE — PROGRESS NOTES
Sarah Bedoya is a 65 y.o. female presenting today for Follow-up (Pt is still having headache and chest pains.)  .     Chief Complaint   Patient presents with    Follow-up     Pt is still having headache and chest pains.       HPI:  Sarah Bedoya presents to the office today for hospital follow up. Previous patient at [facility].    Patient has a PMH of hypertension, type 2 diabetes with nephropathy, stage III CKD, lumbar spine degeneration, osteoarthritis, anxiety and depression, hyperlipidemia, chronic pain syndrome, fibromyalgia, hypothyroidism, low vitamin D    Patient was recently hospitalized on 2/18/2025 for 6 days for benzodiazepine withdrawal. she was taking lorazepam 2 mg 4 times daily for 10 years and was not able to get it refilled in January. Patient had been experiencing withdrawals and was not eating or drinking properly.  In the hospital she was noted to have severe B12 and vitamin D deficiency.  She was started on Cymbalta and given Ativan prn. Was also given supplementation for B12 and vitamin D. She was discharged AMA because she had to go home to her cat. Patient given home health care as well as PT/OT.    Patient was recently admitted on 3/28/2025 for chest pain.  Troponin peak 20, stress test was negative for ischemia.  She was diagnosed with NSTEMI. She reports a lot of non specific symptoms of headache and chest pain that are still ongoing.     Anxiety and depression: Patient is on Cymbalta 30 mg, and Ativan as needed. Was given follow up with psychiatry but has not seen them yet.  The previous psychiatrist she has worked with wanted to bring up old issues which she did not like.  Patient's reported that she was taking lorazepam 2 mg 4 times daily for 10 years.  Back in January she went to  her prescription for 120 tablets but was only given a pressure refill of 20 tablets.  Patient started having withdrawal symptoms of abdominal pain, nausea, vision changes and

## 2025-04-03 ENCOUNTER — OFFICE VISIT (OUTPATIENT)
Facility: CLINIC | Age: 65
End: 2025-04-03

## 2025-04-03 VITALS
TEMPERATURE: 97.9 F | HEART RATE: 90 BPM | RESPIRATION RATE: 18 BRPM | HEIGHT: 60 IN | BODY MASS INDEX: 25.52 KG/M2 | SYSTOLIC BLOOD PRESSURE: 88 MMHG | WEIGHT: 130 LBS | DIASTOLIC BLOOD PRESSURE: 59 MMHG | OXYGEN SATURATION: 96 %

## 2025-04-03 DIAGNOSIS — F13.939 BENZODIAZEPINE WITHDRAWAL WITH COMPLICATION (HCC): ICD-10-CM

## 2025-04-03 DIAGNOSIS — M79.7 FIBROMYALGIA: ICD-10-CM

## 2025-04-03 DIAGNOSIS — E11.21 TYPE 2 DIABETES MELLITUS WITH DIABETIC NEPHROPATHY, WITHOUT LONG-TERM CURRENT USE OF INSULIN (HCC): ICD-10-CM

## 2025-04-03 DIAGNOSIS — E86.1 HYPOTENSION DUE TO HYPOVOLEMIA: Primary | ICD-10-CM

## 2025-04-03 DIAGNOSIS — F41.9 ANXIETY AND DEPRESSION: ICD-10-CM

## 2025-04-03 DIAGNOSIS — N18.31 STAGE 3A CHRONIC KIDNEY DISEASE (HCC): ICD-10-CM

## 2025-04-03 DIAGNOSIS — E78.2 MIXED HYPERLIPIDEMIA: ICD-10-CM

## 2025-04-03 DIAGNOSIS — F32.A ANXIETY AND DEPRESSION: ICD-10-CM

## 2025-04-03 DIAGNOSIS — Z76.0 MEDICATION REFILL: ICD-10-CM

## 2025-04-03 RX ORDER — LORAZEPAM 1 MG/1
1 TABLET ORAL EVERY 8 HOURS PRN
Qty: 45 TABLET | Refills: 0 | Status: SHIPPED | OUTPATIENT
Start: 2025-04-03 | End: 2025-04-18

## 2025-04-03 RX ORDER — TOPIRAMATE 50 MG/1
TABLET, FILM COATED ORAL
COMMUNITY
Start: 2025-04-01

## 2025-04-03 RX ORDER — DULOXETIN HYDROCHLORIDE 30 MG/1
60 CAPSULE, DELAYED RELEASE ORAL DAILY
COMMUNITY
Start: 2025-04-01

## 2025-04-03 RX ORDER — METHOCARBAMOL 750 MG/1
750 TABLET, FILM COATED ORAL 2 TIMES DAILY PRN
Qty: 60 TABLET | Refills: 0 | Status: SHIPPED | OUTPATIENT
Start: 2025-04-03

## 2025-04-03 RX ORDER — PREGABALIN 100 MG/1
100 CAPSULE ORAL 2 TIMES DAILY
COMMUNITY
Start: 2025-04-01 | End: 2025-04-03 | Stop reason: SDUPTHER

## 2025-04-03 RX ORDER — PREGABALIN 100 MG/1
100 CAPSULE ORAL 2 TIMES DAILY
Qty: 120 CAPSULE | Refills: 0 | Status: SHIPPED | OUTPATIENT
Start: 2025-04-03 | End: 2025-06-02

## 2025-04-03 SDOH — ECONOMIC STABILITY: FOOD INSECURITY: WITHIN THE PAST 12 MONTHS, THE FOOD YOU BOUGHT JUST DIDN'T LAST AND YOU DIDN'T HAVE MONEY TO GET MORE.: NEVER TRUE

## 2025-04-03 SDOH — ECONOMIC STABILITY: FOOD INSECURITY: WITHIN THE PAST 12 MONTHS, YOU WORRIED THAT YOUR FOOD WOULD RUN OUT BEFORE YOU GOT MONEY TO BUY MORE.: NEVER TRUE

## 2025-04-03 ASSESSMENT — ANXIETY QUESTIONNAIRES
IF YOU CHECKED OFF ANY PROBLEMS ON THIS QUESTIONNAIRE, HOW DIFFICULT HAVE THESE PROBLEMS MADE IT FOR YOU TO DO YOUR WORK, TAKE CARE OF THINGS AT HOME, OR GET ALONG WITH OTHER PEOPLE: EXTREMELY DIFFICULT
5. BEING SO RESTLESS THAT IT IS HARD TO SIT STILL: NEARLY EVERY DAY
3. WORRYING TOO MUCH ABOUT DIFFERENT THINGS: NEARLY EVERY DAY
GAD7 TOTAL SCORE: 21
7. FEELING AFRAID AS IF SOMETHING AWFUL MIGHT HAPPEN: NEARLY EVERY DAY
2. NOT BEING ABLE TO STOP OR CONTROL WORRYING: NEARLY EVERY DAY
1. FEELING NERVOUS, ANXIOUS, OR ON EDGE: NEARLY EVERY DAY
4. TROUBLE RELAXING: NEARLY EVERY DAY
6. BECOMING EASILY ANNOYED OR IRRITABLE: NEARLY EVERY DAY

## 2025-04-03 ASSESSMENT — PATIENT HEALTH QUESTIONNAIRE - PHQ9
SUM OF ALL RESPONSES TO PHQ QUESTIONS 1-9: 7
4. FEELING TIRED OR HAVING LITTLE ENERGY: SEVERAL DAYS
SUM OF ALL RESPONSES TO PHQ QUESTIONS 1-9: 7
3. TROUBLE FALLING OR STAYING ASLEEP: SEVERAL DAYS
2. FEELING DOWN, DEPRESSED OR HOPELESS: SEVERAL DAYS
6. FEELING BAD ABOUT YOURSELF - OR THAT YOU ARE A FAILURE OR HAVE LET YOURSELF OR YOUR FAMILY DOWN: SEVERAL DAYS
1. LITTLE INTEREST OR PLEASURE IN DOING THINGS: NOT AT ALL
9. THOUGHTS THAT YOU WOULD BE BETTER OFF DEAD, OR OF HURTING YOURSELF: NOT AT ALL
7. TROUBLE CONCENTRATING ON THINGS, SUCH AS READING THE NEWSPAPER OR WATCHING TELEVISION: SEVERAL DAYS
8. MOVING OR SPEAKING SO SLOWLY THAT OTHER PEOPLE COULD HAVE NOTICED. OR THE OPPOSITE, BEING SO FIGETY OR RESTLESS THAT YOU HAVE BEEN MOVING AROUND A LOT MORE THAN USUAL: SEVERAL DAYS
SUM OF ALL RESPONSES TO PHQ QUESTIONS 1-9: 7
SUM OF ALL RESPONSES TO PHQ QUESTIONS 1-9: 7
10. IF YOU CHECKED OFF ANY PROBLEMS, HOW DIFFICULT HAVE THESE PROBLEMS MADE IT FOR YOU TO DO YOUR WORK, TAKE CARE OF THINGS AT HOME, OR GET ALONG WITH OTHER PEOPLE: SOMEWHAT DIFFICULT
5. POOR APPETITE OR OVEREATING: SEVERAL DAYS

## 2025-04-03 NOTE — PROGRESS NOTES
\"Have you been to the ER, urgent care clinic since your last visit?  Hospitalized since your last visit?\"    YES - When: approximately 1  weeks ago.  Where and Why: Pt went to the ED for head and chest pain.    “Have you seen or consulted any other health care providers outside our system since your last visit?”    NO    Have you had a mammogram?”   NO    No breast cancer screening on file       “Have you had a colorectal cancer screening such as a colonoscopy/FIT/Cologuard?    NO    Date of last Colonoscopy: 1/20/2012  No cologuard on file  Date of last FIT: 6/27/2022   No flexible sigmoidoscopy on file     “Have you had a diabetic eye exam?”    NO     Date of last diabetic eye exam: 6/19/2015

## 2025-04-07 ENCOUNTER — TELEPHONE (OUTPATIENT)
Facility: CLINIC | Age: 65
End: 2025-04-07

## 2025-04-07 DIAGNOSIS — F13.939 BENZODIAZEPINE WITHDRAWAL WITH COMPLICATION (HCC): Primary | ICD-10-CM

## 2025-04-07 RX ORDER — ISOPROPYL ALCOHOL 70 ML/100ML
SWAB TOPICAL
Refills: 3 | OUTPATIENT
Start: 2025-04-07

## 2025-04-07 NOTE — TELEPHONE ENCOUNTER
Called patient back, new referral placed at    [facility]  Address: [address] [facility] [address], [place] [address]  Phone: [phone]    Patient states her BP today is 132/86. She also complains of swelling in her legs. Patient has a Nurse visit on 4/10/25 in the clinic

## 2025-04-07 NOTE — TELEPHONE ENCOUNTER
At [facility] reporting that pt is requesting to delay home health until Wednesday      At [facility] stated that pt does not sound well  Pt's BP was 80 over something (stated pt did not know bottom number)  Pt's BP today 133 over something (pt did not know bottom number   At [facility] stated they could not ask more questions because did they do not have consent  At [facility] requesting that a call be made to pt because she does not sound well at all  Pt stated to At [facility] that she has never felt this miserable     At [facility] contact number [phone]

## 2025-04-07 NOTE — TELEPHONE ENCOUNTER
At [facility] reporting that pt is requesting to delay home health until Wednesday     At [facility] stated that pt does not sound well  Pt's BP was 80 over something (stated pt did not know bottom number)  Pt's BP today 133 over something (pt did not know bottom number   At [facility] stated they could not ask more questions because did they do not have consent  At [facility] requesting that a call be made to pt because she does not sound well at all  Pt stated to At [facility] that she has never felt this miserable    At [facility] contact number [phone]

## 2025-04-07 NOTE — TELEPHONE ENCOUNTER
Pt request referral to another location close to home, for Psychiatry stated due to transportation     pt request call back         We tired to call other practices     1 [facility] only self pay    2 [facility] not accepting insurance     3 [facility] not accepting insurance

## 2025-04-09 ENCOUNTER — TELEPHONE (OUTPATIENT)
Facility: CLINIC | Age: 65
End: 2025-04-09

## 2025-04-09 NOTE — TELEPHONE ENCOUNTER
At [facility] reporting     Pt completed physical therapy   At [facility] requesting order for nurse for high risk high blood pressure  At [facility] requesting  for community resources  At [facility] stated that pt is skipping next week's physical therapy per pt request  At [facility] stated will resume physical therapy after next week

## 2025-04-10 ENCOUNTER — CLINICAL SUPPORT (OUTPATIENT)
Facility: CLINIC | Age: 65
End: 2025-04-10

## 2025-04-10 VITALS — SYSTOLIC BLOOD PRESSURE: 105 MMHG | DIASTOLIC BLOOD PRESSURE: 69 MMHG

## 2025-04-10 DIAGNOSIS — E11.21 TYPE 2 DIABETES MELLITUS WITH DIABETIC NEPHROPATHY, WITHOUT LONG-TERM CURRENT USE OF INSULIN (HCC): ICD-10-CM

## 2025-04-10 RX ORDER — METFORMIN HYDROCHLORIDE 750 MG/1
750 TABLET, EXTENDED RELEASE ORAL
Qty: 90 TABLET | Refills: 1 | Status: SHIPPED | OUTPATIENT
Start: 2025-04-10

## 2025-04-17 ENCOUNTER — TELEPHONE (OUTPATIENT)
Facility: CLINIC | Age: 65
End: 2025-04-17

## 2025-04-17 DIAGNOSIS — F13.939 BENZODIAZEPINE WITHDRAWAL WITH COMPLICATION (HCC): ICD-10-CM

## 2025-04-17 DIAGNOSIS — Z76.0 MEDICATION REFILL: ICD-10-CM

## 2025-04-17 RX ORDER — LORAZEPAM 1 MG/1
1 TABLET ORAL EVERY 8 HOURS PRN
Qty: 45 TABLET | Refills: 0 | Status: SHIPPED | OUTPATIENT
Start: 2025-04-17 | End: 2025-05-08

## 2025-04-17 NOTE — TELEPHONE ENCOUNTER
Patient called in requesting medication for for Benzodiazepine withdrawals. She doesn't have an appointment until 5/7 @ 2:30pm w/ Dr. Elisabeth Olvera. She wants to know if the provider can send more in until she sees that doctor.    Pharmacy:  [id number] [address] - P [phone] - F [phone]

## 2025-04-29 RX ORDER — METHOCARBAMOL 750 MG/1
TABLET, FILM COATED ORAL
Qty: 60 TABLET | Refills: 0 | Status: SHIPPED | OUTPATIENT
Start: 2025-04-29

## 2025-05-05 ENCOUNTER — TELEPHONE (OUTPATIENT)
Facility: CLINIC | Age: 65
End: 2025-05-05

## 2025-05-05 NOTE — TELEPHONE ENCOUNTER
Pt stated she may run out of Ativan before her scheduled appt with Psychiatrist    Pt requesting refill    Ativan 1 MG     [facility]  [address] [place] [address]    [phone]    Pt requesting call back to discuss refill

## 2025-05-05 NOTE — TELEPHONE ENCOUNTER
Patient was prescribed enough Ativan until her appointment with psychiatry on 5/7/2025. I will not be refilling prescription. I discussed with patient previously that I will no longer refill medication until she sees a psychiatrist who can wean her off medication.

## 2025-05-06 ENCOUNTER — TELEPHONE (OUTPATIENT)
Facility: CLINIC | Age: 65
End: 2025-05-06

## 2025-05-06 NOTE — TELEPHONE ENCOUNTER
Discussed with patient that I will not be refilling prescription for Ativan as she was given enough pills until her appointment with psychiatry tomorrow. Patient understands and agrees to see her psychiatrist tomorrow.

## 2025-05-06 NOTE — TELEPHONE ENCOUNTER
Pt request refill on medication     LORazepam (ATIVAN) 1 MG tablet     Location     [facility]

## 2025-05-07 DIAGNOSIS — F13.939 BENZODIAZEPINE WITHDRAWAL WITH COMPLICATION (HCC): ICD-10-CM

## 2025-05-07 DIAGNOSIS — Z76.0 MEDICATION REFILL: ICD-10-CM

## 2025-05-07 RX ORDER — LORAZEPAM 1 MG/1
TABLET ORAL
Qty: 45 TABLET | OUTPATIENT
Start: 2025-05-07

## 2025-05-07 NOTE — TELEPHONE ENCOUNTER
Pt called concerning refill for Lorazepam. Pharmacy states they do not have refill request from Dr. Felix.     [id number] [place] [address] - P [phone] - F [phone] (Pharmacy)

## 2025-05-13 ENCOUNTER — CARE COORDINATION (OUTPATIENT)
Facility: CLINIC | Age: 65
End: 2025-05-13

## 2025-05-13 RX ORDER — LORAZEPAM 1 MG/1
1 TABLET ORAL 3 TIMES DAILY
COMMUNITY

## 2025-05-13 ASSESSMENT — PATIENT HEALTH QUESTIONNAIRE - PHQ9
2. FEELING DOWN, DEPRESSED OR HOPELESS: SEVERAL DAYS
1. LITTLE INTEREST OR PLEASURE IN DOING THINGS: SEVERAL DAYS
SUM OF ALL RESPONSES TO PHQ QUESTIONS 1-9: 2

## 2025-05-13 NOTE — CARE COORDINATION
Ambulatory Care Coordination Note     2025 4:41 PM     Patient Current Location:  Home: [address] [place], [place] [address] [address]  [facility] [address]     This patient was received as a referral from Care Transition Nurse.    ACM contacted the patient by telephone. Verified name and  with patient as identifiers. Provided introduction to self, and explanation of the ACM role.   Patient accepted care management services at this time.          ACM: Hugh Villagran RN     Challenges to be reviewed by the provider   Additional needs identified to be addressed with provider No  none               Method of communication with provider: phone.    Utilization: Initial Call - N/A    Care Summary Note:   Patient enrolled in Complex Case Management effective 2025 and will be followed per AC protocol. Initial questions answered with subsequent encounters planned.   Further / follow up appointments listed below - reviewed upcoming appointments  Further questions answered as needed and patient has ACM contact information  Initial review of medications with attention to any side effects and determination that patient has sufficient quantity of meds and/or refills.  Initial assessment done with attention to primary illness and / or condition.  Respiratory and cardiac status shows no issues at present.  Several issues discovered upon initial contact. Financial issues as well as transportation difficulties were noted. Patient scheduled to meet with  tomorrow (25) and is scheduled to see PCP on Monday May 19th. ACM will follow up with patient on May 16th to determine any resolutions that have been discussed with the     Offered patient enrollment in the Remote Patient Monitoring (RPM) program for in-home monitoring: Yes, but did not enroll at this time: limited patient ability to navigate RPM/equipment.     Assessments Completed:   Ambulatory Care Coordination Assessment    Care Coordination Protocol  Referral

## 2025-05-19 ENCOUNTER — TELEPHONE (OUTPATIENT)
Facility: CLINIC | Age: 65
End: 2025-05-19

## 2025-05-19 RX ORDER — CALCIUM CITRATE/VITAMIN D3 200MG-6.25
TABLET ORAL
Qty: 100 STRIP | Refills: 0 | Status: SHIPPED | OUTPATIENT
Start: 2025-05-19

## 2025-05-19 RX ORDER — ESOMEPRAZOLE MAGNESIUM 40 MG/1
40 CAPSULE, DELAYED RELEASE ORAL
Qty: 90 CAPSULE | Refills: 1 | Status: SHIPPED | OUTPATIENT
Start: 2025-05-19

## 2025-05-19 NOTE — TELEPHONE ENCOUNTER
Pt called stated she needs a refill on her nexium and true Metrix strips     Location [facility]

## 2025-05-20 ENCOUNTER — CARE COORDINATION (OUTPATIENT)
Facility: CLINIC | Age: 65
End: 2025-05-20

## 2025-05-20 ASSESSMENT — PATIENT HEALTH QUESTIONNAIRE - PHQ9
SUM OF ALL RESPONSES TO PHQ QUESTIONS 1-9: 2
1. LITTLE INTEREST OR PLEASURE IN DOING THINGS: SEVERAL DAYS
2. FEELING DOWN, DEPRESSED OR HOPELESS: SEVERAL DAYS

## 2025-05-20 NOTE — CARE COORDINATION
Ambulatory Care Coordination Note     2025 3:54 PM     Patient Current Location:  Home: [address] [place] [address] [address] [place] [address]     ACM contacted the patient by telephone. Verified name and  with patient as identifiers.         ACM: Hugh Villagran RN     Challenges to be reviewed by the provider   Additional needs identified to be addressed with provider No                 Method of communication with provider: phone.    Utilization: Patient has not had any utilization since our last call.     Care Summary Note: No notable change in Patient health status from last encounter. Follow up appointments listed below and questions from Patient / Care Giver answered.  Patient has ACM contact information.    Attention to medication at this encounter to any side effects and determination that patient has sufficient quantity of meds and/or refills. Shows understanding of medication therapy  Assessment done with attention to primary illness and / or condition.  Respiratory and cardiac status shows no issues at present      Assessments Completed:   No changes since last call    Medications Reviewed:   Patient denies any changes with medications and reports taking all medications as prescribed.    Advance Care Planning:   Not on file; education provided     Care Planning:    Goals Addressed                   This Visit's Progress     Attend follow up appointments on schedule   On track     Prepare patients and caregivers for end of life decisions (ie. need for hospice, pain management, symptom relief, advance directives etc.)   On track     Take all medications as ordered   On track             PCP/Specialist follow up:   Future Appointments         Provider Specialty Dept Phone    2025 3:00 PM Grace Felix MD Family Medicine [phone]            Follow Up:   Plan for next AC outreach in approximately 2 weeks to complete:  - medication review   - advance care planning   - goal progression  - education .

## 2025-05-29 ENCOUNTER — COMMUNITY OUTREACH (OUTPATIENT)
Facility: CLINIC | Age: 65
End: 2025-05-29

## 2025-05-29 RX ORDER — METHOCARBAMOL 750 MG/1
TABLET, FILM COATED ORAL
Qty: 60 TABLET | Refills: 0 | Status: SHIPPED | OUTPATIENT
Start: 2025-05-29

## 2025-06-04 ENCOUNTER — TELEPHONE (OUTPATIENT)
Facility: CLINIC | Age: 65
End: 2025-06-04

## 2025-06-06 ENCOUNTER — CARE COORDINATION (OUTPATIENT)
Facility: CLINIC | Age: 65
End: 2025-06-06

## 2025-06-06 NOTE — CARE COORDINATION
Ambulatory Care Coordination Note     6/6/2025 3:29 PM     Patient outreach attempt by this ACM today to perform care management follow up . ACM was unable to reach the patient by telephone today;   left voice message requesting a return phone call to this ACM.     ACM: Hugh Villagran RN     Care Summary Note: NA    PCP/Specialist follow up:   Future Appointments         Provider Specialty Dept Phone    6/23/2025 3:45 PM Grace Felix MD Family Medicine [phone]            Follow Up:   Plan for next ACM outreach in approximately 1 week to complete:  - medication review  - advance care planning  - goal progression  - education .

## 2025-06-09 RX ORDER — SIMVASTATIN 40 MG
40 TABLET ORAL NIGHTLY
Qty: 90 TABLET | Refills: 0 | Status: SHIPPED | OUTPATIENT
Start: 2025-06-09 | End: 2025-06-12 | Stop reason: SDUPTHER

## 2025-06-12 ENCOUNTER — TELEPHONE (OUTPATIENT)
Facility: CLINIC | Age: 65
End: 2025-06-12

## 2025-06-12 DIAGNOSIS — M79.7 FIBROMYALGIA: ICD-10-CM

## 2025-06-12 RX ORDER — SIMVASTATIN 40 MG
40 TABLET ORAL NIGHTLY
Qty: 90 TABLET | Refills: 0 | Status: SHIPPED | OUTPATIENT
Start: 2025-06-12

## 2025-06-12 RX ORDER — PREGABALIN 100 MG/1
100 CAPSULE ORAL 2 TIMES DAILY
Qty: 120 CAPSULE | Refills: 0 | Status: SHIPPED | OUTPATIENT
Start: 2025-06-12 | End: 2025-08-11

## 2025-06-12 NOTE — TELEPHONE ENCOUNTER
Pt request refill on medication/ need go to another location     simvastatin (ZOCOR) 40 MG tablet     pregabalin (LYRICA) 100 MG capsule [[id number]]    Location     [facility]

## 2025-06-13 ENCOUNTER — CARE COORDINATION (OUTPATIENT)
Facility: CLINIC | Age: 65
End: 2025-06-13

## 2025-06-13 NOTE — CARE COORDINATION
Ambulatory Care Coordination Note     6/13/2025 2:48 PM     Patient outreach attempt by this ACM today to perform care management follow up . ACM was unable to reach the patient by telephone today;   left voice message requesting a return phone call to this ACM.     ACM: Hugh Villagran RN     Care Summary Note: NA    PCP/Specialist follow up:   Future Appointments         Provider Specialty Dept Phone    6/23/2025 3:45 PM Grace Felix MD Family Medicine [phone]            Follow Up:   Plan for next ACM outreach in approximately 1 week to complete:  - medication review  - advance care planning  - goal progression  - education .

## 2025-06-23 ENCOUNTER — TELEPHONE (OUTPATIENT)
Facility: CLINIC | Age: 65
End: 2025-06-23

## 2025-06-24 ENCOUNTER — TELEPHONE (OUTPATIENT)
Facility: CLINIC | Age: 65
End: 2025-06-24

## 2025-06-29 ENCOUNTER — CARE COORDINATION (OUTPATIENT)
Facility: CLINIC | Age: 65
End: 2025-06-29

## 2025-07-03 RX ORDER — METHOCARBAMOL 750 MG/1
TABLET, FILM COATED ORAL
Qty: 60 TABLET | Refills: 0 | Status: SHIPPED | OUTPATIENT
Start: 2025-07-03

## 2025-07-07 ENCOUNTER — TELEPHONE (OUTPATIENT)
Facility: CLINIC | Age: 65
End: 2025-07-07

## 2025-07-25 ENCOUNTER — TELEMEDICINE (OUTPATIENT)
Facility: CLINIC | Age: 65
End: 2025-07-25

## 2025-07-25 DIAGNOSIS — I95.9 HYPOTENSION, UNSPECIFIED HYPOTENSION TYPE: ICD-10-CM

## 2025-07-25 DIAGNOSIS — E55.9 VITAMIN D DEFICIENCY: ICD-10-CM

## 2025-07-25 DIAGNOSIS — G40.909 SEIZURE DISORDER (HCC): Primary | ICD-10-CM

## 2025-07-25 RX ORDER — TOPIRAMATE 200 MG/1
200 TABLET, FILM COATED ORAL 2 TIMES DAILY
Qty: 60 TABLET | Refills: 3 | Status: SHIPPED | OUTPATIENT
Start: 2025-07-25

## 2025-07-25 RX ORDER — MIDODRINE HYDROCHLORIDE 2.5 MG/1
2.5 TABLET ORAL 3 TIMES DAILY
Qty: 90 TABLET | Refills: 0 | Status: SHIPPED | OUTPATIENT
Start: 2025-07-25 | End: 2025-08-24

## 2025-07-25 RX ORDER — ERGOCALCIFEROL 1.25 MG/1
50000 CAPSULE ORAL WEEKLY
Qty: 4 CAPSULE | Refills: 3 | Status: CANCELLED | OUTPATIENT
Start: 2025-07-25

## 2025-07-25 RX ORDER — CHOLECALCIFEROL (VITAMIN D3) 25 MCG
1000 TABLET ORAL DAILY
Qty: 90 TABLET | Refills: 1 | Status: SHIPPED | OUTPATIENT
Start: 2025-07-25

## 2025-07-25 SDOH — ECONOMIC STABILITY: FOOD INSECURITY: WITHIN THE PAST 12 MONTHS, THE FOOD YOU BOUGHT JUST DIDN'T LAST AND YOU DIDN'T HAVE MONEY TO GET MORE.: NEVER TRUE

## 2025-07-25 SDOH — ECONOMIC STABILITY: FOOD INSECURITY: WITHIN THE PAST 12 MONTHS, YOU WORRIED THAT YOUR FOOD WOULD RUN OUT BEFORE YOU GOT MONEY TO BUY MORE.: NEVER TRUE

## 2025-07-25 ASSESSMENT — PATIENT HEALTH QUESTIONNAIRE - PHQ9
SUM OF ALL RESPONSES TO PHQ QUESTIONS 1-9: 8
4. FEELING TIRED OR HAVING LITTLE ENERGY: SEVERAL DAYS
SUM OF ALL RESPONSES TO PHQ QUESTIONS 1-9: 8
9. THOUGHTS THAT YOU WOULD BE BETTER OFF DEAD, OR OF HURTING YOURSELF: NOT AT ALL
1. LITTLE INTEREST OR PLEASURE IN DOING THINGS: SEVERAL DAYS
2. FEELING DOWN, DEPRESSED OR HOPELESS: SEVERAL DAYS
SUM OF ALL RESPONSES TO PHQ QUESTIONS 1-9: 8
SUM OF ALL RESPONSES TO PHQ QUESTIONS 1-9: 8
8. MOVING OR SPEAKING SO SLOWLY THAT OTHER PEOPLE COULD HAVE NOTICED. OR THE OPPOSITE, BEING SO FIGETY OR RESTLESS THAT YOU HAVE BEEN MOVING AROUND A LOT MORE THAN USUAL: SEVERAL DAYS
6. FEELING BAD ABOUT YOURSELF - OR THAT YOU ARE A FAILURE OR HAVE LET YOURSELF OR YOUR FAMILY DOWN: SEVERAL DAYS
10. IF YOU CHECKED OFF ANY PROBLEMS, HOW DIFFICULT HAVE THESE PROBLEMS MADE IT FOR YOU TO DO YOUR WORK, TAKE CARE OF THINGS AT HOME, OR GET ALONG WITH OTHER PEOPLE: SOMEWHAT DIFFICULT
7. TROUBLE CONCENTRATING ON THINGS, SUCH AS READING THE NEWSPAPER OR WATCHING TELEVISION: SEVERAL DAYS
3. TROUBLE FALLING OR STAYING ASLEEP: SEVERAL DAYS
5. POOR APPETITE OR OVEREATING: SEVERAL DAYS

## 2025-07-25 ASSESSMENT — ENCOUNTER SYMPTOMS
DIARRHEA: 0
VOMITING: 0
ABDOMINAL DISTENTION: 0
COUGH: 0
RHINORRHEA: 0
ABDOMINAL PAIN: 0
CHEST TIGHTNESS: 0
SORE THROAT: 0
SHORTNESS OF BREATH: 0
NAUSEA: 0

## 2025-07-25 NOTE — PROGRESS NOTES
Sarah Bedoya, was evaluated through a synchronous (real-time) audio-video encounter. The patient (or guardian if applicable) is aware that this is a billable service, which includes applicable co-pays. This Virtual Visit was conducted with patient's (and/or legal guardian's) consent. Patient identification was verified, and a caregiver was present when appropriate.   The patient was located at Home: [address] [address]  Provider was located at Facility (Appt Dept): [address]  [address]  [place] [address]  Confirm you are appropriately licensed, registered, or certified to deliver care in the state where the patient is located as indicated above. If you are not or unsure, please re-schedule the visit: Yes, I confirm.     Sarah Bedoya (:  1960) is a Established patient, presenting virtually for evaluation of the following:      Below is the assessment and plan developed based on review of pertinent history, physical exam, labs, studies, and medications.     Assessment & Plan  Seizure disorder (HCC)   Chronic, at goal (stable), continue current treatment plan    Orders:    topiramate (TOPAMAX) 200 MG tablet; Take 1 tablet by mouth 2 times daily    External Referral To Neurology    Vitamin D deficiency   Chronic, at goal (stable), changes made today: Stop high-dose vitamin D and start daily maintenance.  Will recheck vitamin D levels at next visit    Orders:    vitamin D3 (CHOLECALCIFEROL) 25 MCG (1000 UT) TABS tablet; Take 1 tablet by mouth daily    Hypotension, unspecified hypotension type   Chronic, not at goal (unstable), changes made today: Start midodrine low-dose 3 times daily, encourage patient to increase her daily water intake and lifestyle modifications recommended    Patient reported lightheadedness and dizziness which is chronic, not on any antihypertensive medications at this time.   Orders:    midodrine (PROAMATINE) 2.5 MG tablet; Take 1 tablet

## 2025-07-25 NOTE — PROGRESS NOTES
Have you been to the ER, urgent care clinic since your last visit?  Hospitalized since your last visit?   NO    Have you seen or consulted any other health care providers outside our system since your last visit?   NO    Have you had a mammogram?”   NO    No breast cancer screening on file       “Have you had a colorectal cancer screening such as a colonoscopy/FIT/Cologuard?    NO    Date of last Colonoscopy: 1/20/2012  No cologuard on file  Date of last FIT: 6/27/2022   No flexible sigmoidoscopy on file     “Have you had a diabetic eye exam?”    NO     Date of last diabetic eye exam: 6/19/2015

## 2025-08-01 ENCOUNTER — TELEPHONE (OUTPATIENT)
Facility: CLINIC | Age: 65
End: 2025-08-01

## 2025-08-11 ENCOUNTER — TELEPHONE (OUTPATIENT)
Facility: CLINIC | Age: 65
End: 2025-08-11

## 2025-08-11 DIAGNOSIS — M79.7 FIBROMYALGIA: ICD-10-CM

## 2025-08-11 RX ORDER — PREGABALIN 100 MG/1
100 CAPSULE ORAL 2 TIMES DAILY
Qty: 120 CAPSULE | Refills: 0 | Status: SHIPPED | OUTPATIENT
Start: 2025-08-11 | End: 2025-10-10

## 2025-08-11 RX ORDER — METHOCARBAMOL 750 MG/1
750 TABLET, FILM COATED ORAL 2 TIMES DAILY PRN
Qty: 60 TABLET | Refills: 0 | Status: SHIPPED | OUTPATIENT
Start: 2025-08-11

## 2025-08-11 RX ORDER — METHOCARBAMOL 750 MG/1
TABLET, FILM COATED ORAL
Qty: 60 TABLET | Refills: 0 | OUTPATIENT
Start: 2025-08-11

## 2025-08-18 RX ORDER — CALCIUM CITRATE/VITAMIN D3 200MG-6.25
TABLET ORAL
Qty: 100 STRIP | Refills: 0 | Status: SHIPPED | OUTPATIENT
Start: 2025-08-18

## 2025-09-04 DIAGNOSIS — E11.21 TYPE 2 DIABETES MELLITUS WITH DIABETIC NEPHROPATHY, WITHOUT LONG-TERM CURRENT USE OF INSULIN (HCC): ICD-10-CM

## 2025-09-04 RX ORDER — METFORMIN HYDROCHLORIDE 750 MG/1
TABLET, EXTENDED RELEASE ORAL
Qty: 90 TABLET | Refills: 3 | Status: SHIPPED | OUTPATIENT
Start: 2025-09-04